# Patient Record
Sex: FEMALE | Race: WHITE | NOT HISPANIC OR LATINO | Employment: OTHER | URBAN - NONMETROPOLITAN AREA
[De-identification: names, ages, dates, MRNs, and addresses within clinical notes are randomized per-mention and may not be internally consistent; named-entity substitution may affect disease eponyms.]

---

## 2023-03-27 ENCOUNTER — TELEPHONE (OUTPATIENT)
Dept: OBSTETRICS AND GYNECOLOGY | Facility: CLINIC | Age: 33
End: 2023-03-27

## 2023-03-27 NOTE — TELEPHONE ENCOUNTER
Caller:  GERSON     Relationship: SELF     Best call back number: [phone]    What is the best time to reach you: ANYTIME     Who are you requesting to speak with (clinical staff, provider,  specific staff member): BILLIE OR SEAN SOLIS         What was the call regarding: PT HAS QUESTION REGARDING WHAT MEDICATIONS ARE SAFE TO TAKE DURING PREGNANCY.    Do you require a callback: YES

## 2023-07-13 ENCOUNTER — TELEPHONE (OUTPATIENT)
Dept: OBSTETRICS AND GYNECOLOGY | Facility: CLINIC | Age: 33
End: 2023-07-13

## 2023-07-13 NOTE — TELEPHONE ENCOUNTER
Provider: JORGE ALBERTO Angulo  Caller: GERSON SANCHEZ  Relationship to Patient: SELF    Phone Number: [phone]  Reason for Call: PATIENT DID NOT GO TO LABOR PERDUE  When was the patient last seen: 06.30.23  When did it start: 07.12.23 AROUND 04:30 PM  TO 09:30 PM  Where is it located: ABD    PATIENT STATED THAT SHE DID CALL [facility] YESTERDAY 07.12.23. PATIENT STATED THAT SHE HAD TIGHTNESS, HEAVINESS AND MUSCLE CRAMPS THAT WENT AWAY LATER AS IT GOT CLOSER TO 9:30 PM.  PATIENT WOULD LIKE TO KNOW WHAT YOU WOULD LIKE FOR HER TO DO NEXT.    PATIENT CAN BE REACHED AT THE NUMBER ABOVE    THANK YOU

## 2023-09-20 ENCOUNTER — TELEPHONE (OUTPATIENT)
Dept: OBSTETRICS AND GYNECOLOGY | Facility: CLINIC | Age: 33
End: 2023-09-20

## 2023-09-20 NOTE — TELEPHONE ENCOUNTER
Hub staff attempted to follow warm transfer process and was unsuccessful     Caller: Lauren Velasquez    Relationship to patient: Self    Best call back number: [phone]    Patient is needing: PT IS 31 WEEKS NOTICED FEET AND ANKLES ARE SWOLLEN. A LOT OF BACK PAIN IN LOWER BACK AND LEG PAIN. THIS MORNING SHE IS HAVING CRAMPING ON AND OFF AND A LITTLE PRESSURE.

## 2023-10-29 ENCOUNTER — HOSPITAL ENCOUNTER (OUTPATIENT)
Facility: HOSPITAL | Age: 33
Discharge: HOME | End: 2023-10-29
Attending: OBSTETRICS & GYNECOLOGY | Admitting: OBSTETRICS & GYNECOLOGY
Payer: MEDICAID

## 2023-10-29 VITALS
OXYGEN SATURATION: 99 % | DIASTOLIC BLOOD PRESSURE: 57 MMHG | TEMPERATURE: 98.3 F | SYSTOLIC BLOOD PRESSURE: 103 MMHG | HEART RATE: 77 BPM

## 2023-10-29 LAB
BILIRUB BLD-MCNC: NEGATIVE MG/DL
CLARITY, POC: CLEAR
COLOR UR: YELLOW
GLUCOSE UR STRIP-MCNC: NEGATIVE MG/DL
KETONES UR QL: ABNORMAL
LEUKOCYTE EST, POC: NEGATIVE
NITRITE UR-MCNC: NEGATIVE MG/ML
PH UR: 6.5 [PH] (ref 5–8)
PROT UR STRIP-MCNC: NEGATIVE MG/DL
RBC # UR STRIP: NEGATIVE /UL
SP GR UR: 1.02 (ref 1–1.03)
UROBILINOGEN UR QL: NORMAL

## 2023-10-29 PROCEDURE — 84112 EVAL AMNIOTIC FLUID PROTEIN: CPT | Performed by: OBSTETRICS & GYNECOLOGY

## 2023-10-29 PROCEDURE — G0463 HOSPITAL OUTPT CLINIC VISIT: HCPCS

## 2023-10-29 PROCEDURE — 81002 URINALYSIS NONAUTO W/O SCOPE: CPT | Performed by: OBSTETRICS & GYNECOLOGY

## 2023-10-29 PROCEDURE — 59025 FETAL NON-STRESS TEST: CPT

## 2023-10-29 RX ORDER — SODIUM CHLORIDE 0.9 % (FLUSH) 0.9 %
10 SYRINGE (ML) INJECTION EVERY 12 HOURS SCHEDULED
Status: DISCONTINUED | OUTPATIENT
Start: 2023-10-29 | End: 2023-10-29 | Stop reason: HOSPADM

## 2023-10-29 RX ORDER — LIDOCAINE HYDROCHLORIDE 10 MG/ML
0.5 INJECTION, SOLUTION INFILTRATION; PERINEURAL ONCE AS NEEDED
Status: DISCONTINUED | OUTPATIENT
Start: 2023-10-29 | End: 2023-10-29 | Stop reason: HOSPADM

## 2023-10-29 RX ORDER — SODIUM CHLORIDE 9 MG/ML
40 INJECTION, SOLUTION INTRAVENOUS AS NEEDED
Status: DISCONTINUED | OUTPATIENT
Start: 2023-10-29 | End: 2023-10-29 | Stop reason: HOSPADM

## 2023-10-29 RX ORDER — SODIUM CHLORIDE 0.9 % (FLUSH) 0.9 %
10 SYRINGE (ML) INJECTION AS NEEDED
Status: DISCONTINUED | OUTPATIENT
Start: 2023-10-29 | End: 2023-10-29 | Stop reason: HOSPADM

## 2023-10-30 ENCOUNTER — ROUTINE PRENATAL (OUTPATIENT)
Dept: OBSTETRICS AND GYNECOLOGY | Facility: CLINIC | Age: 33
End: 2023-10-30
Payer: MEDICAID

## 2023-10-30 VITALS — DIASTOLIC BLOOD PRESSURE: 64 MMHG | SYSTOLIC BLOOD PRESSURE: 110 MMHG | BODY MASS INDEX: 45.66 KG/M2 | WEIGHT: 278.6 LBS

## 2023-10-30 DIAGNOSIS — Z36.85 ENCOUNTER FOR ANTENATAL SCREENING FOR STREPTOCOCCUS B: Primary | ICD-10-CM

## 2023-10-30 LAB — A1 MICROGLOB PLACENTAL VAG QL: NEGATIVE

## 2023-10-30 PROCEDURE — 99213 OFFICE O/P EST LOW 20 MIN: CPT | Performed by: OBSTETRICS & GYNECOLOGY

## 2023-10-30 RX ORDER — FERROUS SULFATE 325(65) MG
325 TABLET ORAL
Qty: 60 TABLET | Refills: 10 | Status: SHIPPED | OUTPATIENT
Start: 2023-10-30

## 2023-10-30 NOTE — NON STRESS TEST
"Triage Note - Nursing Documentation  Labor and Delivery Admission Log    Lauren Carrasco  : 1990  MRN: [id number]  CSN: [id number]    Date in / Time in:  10/29/2023  Time in:     Date out / Time out:  10/29/2023  Time out:     Nurse: Laurel Mandel RN    Patient Info: She is a 33 y.o. year old  at 36w5d with an SILVIO of 2023, by Last Menstrual Period who was seen on the [facility].    Chief Complaint:   Chief Complaint   Patient presents with    Rupture of Membranes     \"I think my water broke\"       Provider Instructions / Disposition: Pt presented to  complaining of LOF happening around . Amnisure negative. No VB. Occasional ctx. VE . Dr. Jimenez ok for pt to be dc home and keep fu appt tomorrow. Pt verbally understood instructions about when to return to .    Patient Active Problem List   Diagnosis    Hx of  section    S/P  section    Previous  section       NST Documentation (Only applicable > 32 weeks): Interpretation A  Nonstress Test Interpretation A: Reactive (10/29/23 2129 : Laurel Mandel, RN)    "

## 2023-10-30 NOTE — PROGRESS NOTES
Chief Complaint   Patient presents with    Routine Prenatal Visit     Prenatal visit with GBS done today. No problems or concerns.        HPI:   , 36w6d gestation reports doing well    ROS:  See Prenatal Episode/Flowsheet  /64   Wt 126 kg (278 lb 9.6 oz)   LMP 2023   BMI 45.66 kg/m²      EXAM:  EXTREMITIES:  No swelling-See Prenatal Episode/Flowsheet    ABDOMEN:  FHTs/Movement noted-See Prenatal Episode/Flowsheet    URINE GLUCOSE/PROTEIN:  See Prenatal Episode/Flowsheet    PELVIC EXAM:  See Prenatal Episode/Flowsheet  CV:  Lungs:  GYN:    MDM:    Lab Results   Component Value Date    HGB 11.1 (L) 2023    RUBELLAABIGG 1.44 2023    HEPBSAG Negative 2023    ABO O 2023    RH Positive 2023    ABSCRN Negative 2023    WSF1LBC9 Non Reactive 2023    HEPCVIRUSABY Non Reactive 2023       U/S:US Ob Follow Up Transabdominal Approach (2023 10:32)     1. IUP 36w6d  2. Routine care   3. Anemia: Fe sent in   4. R C/S set

## 2023-10-31 NOTE — NON STRESS TEST
Non Stress Test    [facility]    Patient: Lauren Carrasco  : 1990  MRN: [id number]  CSN: [id number]    Gestational Age: 36w5d    Indication for NST Leaking fluid, cramping       Time On 20:36   Time Off 21:30       Interpretation    Baseline 's beats per minute   Category 1   Decelerations Absent       Additional Comments See nursing notes       Recommendations for f/u See nursing notes       This note has been electronically signed.    Bri Jimenez M.D.

## 2023-11-01 LAB — GP B STREP DNA SPEC QL NAA+PROBE: NEGATIVE

## 2023-11-02 ENCOUNTER — HOSPITAL ENCOUNTER (OUTPATIENT)
Facility: HOSPITAL | Age: 33
Setting detail: OBSERVATION
Discharge: HOME | End: 2023-11-03
Attending: OBSTETRICS & GYNECOLOGY | Admitting: OBSTETRICS & GYNECOLOGY
Payer: MEDICAID

## 2023-11-02 ENCOUNTER — HOSPITAL ENCOUNTER (EMERGENCY)
Facility: HOSPITAL | Age: 33
Discharge: HOME | End: 2023-11-02
Attending: STUDENT IN AN ORGANIZED HEALTH CARE EDUCATION/TRAINING PROGRAM
Payer: MEDICAID

## 2023-11-02 VITALS
RESPIRATION RATE: 16 BRPM | DIASTOLIC BLOOD PRESSURE: 64 MMHG | TEMPERATURE: 98.1 F | HEIGHT: 66 IN | WEIGHT: 278.9 LBS | BODY MASS INDEX: 44.82 KG/M2 | OXYGEN SATURATION: 98 % | SYSTOLIC BLOOD PRESSURE: 116 MMHG | HEART RATE: 83 BPM

## 2023-11-02 DIAGNOSIS — O36.8130 DECREASED FETAL MOVEMENTS IN THIRD TRIMESTER, SINGLE OR UNSPECIFIED FETUS: ICD-10-CM

## 2023-11-02 DIAGNOSIS — R10.9 ABDOMINAL CRAMPING: Primary | ICD-10-CM

## 2023-11-02 DIAGNOSIS — R19.7 DIARRHEA, UNSPECIFIED TYPE: ICD-10-CM

## 2023-11-02 DIAGNOSIS — R11.0 NAUSEA: ICD-10-CM

## 2023-11-02 PROBLEM — Z34.90 PREGNANCY: Status: ACTIVE | Noted: 2023-11-02

## 2023-11-02 LAB
ALBUMIN SERPL-MCNC: 3.7 G/DL (ref 3.5–5.2)
ALBUMIN/GLOB SERPL: 1.1 G/DL
ALP SERPL-CCNC: 108 U/L (ref 39–117)
ALT SERPL W P-5'-P-CCNC: 22 U/L (ref 1–33)
ANION GAP SERPL CALCULATED.3IONS-SCNC: 11.8 MMOL/L (ref 5–15)
AST SERPL-CCNC: 22 U/L (ref 1–32)
BACTERIA UR QL AUTO: NORMAL /HPF
BASOPHILS # BLD AUTO: 0.04 10*3/MM3 (ref 0–0.2)
BASOPHILS NFR BLD AUTO: 0.6 % (ref 0–1.5)
BILIRUB SERPL-MCNC: 0.3 MG/DL (ref 0–1.2)
BILIRUB UR QL STRIP: NEGATIVE
BUN SERPL-MCNC: 8 MG/DL (ref 6–20)
BUN/CREAT SERPL: 12.9 (ref 7–25)
CALCIUM SPEC-SCNC: 9.6 MG/DL (ref 8.6–10.5)
CHLORIDE SERPL-SCNC: 101 MMOL/L (ref 98–107)
CLARITY UR: ABNORMAL
CO2 SERPL-SCNC: 20.2 MMOL/L (ref 22–29)
COLOR UR: YELLOW
CREAT SERPL-MCNC: 0.62 MG/DL (ref 0.57–1)
D-LACTATE SERPL-SCNC: 0.9 MMOL/L (ref 0.5–2)
DEPRECATED RDW RBC AUTO: 45.1 FL (ref 37–54)
EGFRCR SERPLBLD CKD-EPI 2021: 120.8 ML/MIN/1.73
EOSINOPHIL # BLD AUTO: 0.01 10*3/MM3 (ref 0–0.4)
EOSINOPHIL NFR BLD AUTO: 0.2 % (ref 0.3–6.2)
ERYTHROCYTE [DISTWIDTH] IN BLOOD BY AUTOMATED COUNT: 14.5 % (ref 12.3–15.4)
FLUAV RNA RESP QL NAA+PROBE: NOT DETECTED
FLUBV RNA RESP QL NAA+PROBE: NOT DETECTED
GLOBULIN UR ELPH-MCNC: 3.3 GM/DL
GLUCOSE SERPL-MCNC: 86 MG/DL (ref 65–99)
GLUCOSE UR STRIP-MCNC: NEGATIVE MG/DL
HCT VFR BLD AUTO: 36.2 % (ref 34–46.6)
HGB BLD-MCNC: 12 G/DL (ref 12–15.9)
HGB UR QL STRIP.AUTO: NEGATIVE
HOLD SPECIMEN: NORMAL
HOLD SPECIMEN: NORMAL
HYALINE CASTS UR QL AUTO: NORMAL /LPF
IMM GRANULOCYTES # BLD AUTO: 0.21 10*3/MM3 (ref 0–0.05)
IMM GRANULOCYTES NFR BLD AUTO: 3.2 % (ref 0–0.5)
KETONES UR QL STRIP: ABNORMAL
LEUKOCYTE ESTERASE UR QL STRIP.AUTO: ABNORMAL
LIPASE SERPL-CCNC: 19 U/L (ref 13–60)
LYMPHOCYTES # BLD AUTO: 0.97 10*3/MM3 (ref 0.7–3.1)
LYMPHOCYTES NFR BLD AUTO: 14.7 % (ref 19.6–45.3)
MCH RBC QN AUTO: 28.4 PG (ref 26.6–33)
MCHC RBC AUTO-ENTMCNC: 33.1 G/DL (ref 31.5–35.7)
MCV RBC AUTO: 85.6 FL (ref 79–97)
MONOCYTES # BLD AUTO: 0.49 10*3/MM3 (ref 0.1–0.9)
MONOCYTES NFR BLD AUTO: 7.4 % (ref 5–12)
NEUTROPHILS NFR BLD AUTO: 4.89 10*3/MM3 (ref 1.7–7)
NEUTROPHILS NFR BLD AUTO: 73.9 % (ref 42.7–76)
NITRITE UR QL STRIP: NEGATIVE
NRBC BLD AUTO-RTO: 0 /100 WBC (ref 0–0.2)
PH UR STRIP.AUTO: 5.5 [PH] (ref 5–8)
PLATELET # BLD AUTO: 208 10*3/MM3 (ref 140–450)
PMV BLD AUTO: 10.3 FL (ref 6–12)
POTASSIUM SERPL-SCNC: 4.3 MMOL/L (ref 3.5–5.2)
PROT SERPL-MCNC: 7 G/DL (ref 6–8.5)
PROT UR QL STRIP: ABNORMAL
RBC # BLD AUTO: 4.23 10*6/MM3 (ref 3.77–5.28)
RBC # UR STRIP: NORMAL /HPF
REF LAB TEST METHOD: NORMAL
SARS-COV-2 RNA RESP QL NAA+PROBE: NOT DETECTED
SODIUM SERPL-SCNC: 133 MMOL/L (ref 136–145)
SP GR UR STRIP: >=1.03 (ref 1–1.03)
SQUAMOUS #/AREA URNS HPF: NORMAL /HPF
UROBILINOGEN UR QL STRIP: ABNORMAL
WBC # UR STRIP: NORMAL /HPF
WBC NRBC COR # BLD: 6.61 10*3/MM3 (ref 3.4–10.8)
WHOLE BLOOD HOLD COAG: NORMAL
WHOLE BLOOD HOLD SPECIMEN: NORMAL

## 2023-11-02 PROCEDURE — 25810000003 LACTATED RINGERS PER 1000 ML: Performed by: OBSTETRICS & GYNECOLOGY

## 2023-11-02 PROCEDURE — G0463 HOSPITAL OUTPT CLINIC VISIT: HCPCS

## 2023-11-02 PROCEDURE — 59025 FETAL NON-STRESS TEST: CPT

## 2023-11-02 PROCEDURE — 87636 SARSCOV2 & INF A&B AMP PRB: CPT

## 2023-11-02 PROCEDURE — 85025 COMPLETE CBC W/AUTO DIFF WBC: CPT

## 2023-11-02 PROCEDURE — 36415 COLL VENOUS BLD VENIPUNCTURE: CPT

## 2023-11-02 PROCEDURE — G0378 HOSPITAL OBSERVATION PER HR: HCPCS

## 2023-11-02 PROCEDURE — 25010000002 PROMETHAZINE PER 50 MG: Performed by: MIDWIFE

## 2023-11-02 PROCEDURE — 96360 HYDRATION IV INFUSION INIT: CPT

## 2023-11-02 PROCEDURE — 83690 ASSAY OF LIPASE: CPT

## 2023-11-02 PROCEDURE — 83605 ASSAY OF LACTIC ACID: CPT

## 2023-11-02 PROCEDURE — 81001 URINALYSIS AUTO W/SCOPE: CPT

## 2023-11-02 PROCEDURE — 96361 HYDRATE IV INFUSION ADD-ON: CPT

## 2023-11-02 PROCEDURE — 80053 COMPREHEN METABOLIC PANEL: CPT

## 2023-11-02 PROCEDURE — 25810000003 SODIUM CHLORIDE 0.9 % SOLUTION: Performed by: PHYSICIAN ASSISTANT

## 2023-11-02 PROCEDURE — 25010000002 ONDANSETRON PER 1 MG: Performed by: PHYSICIAN ASSISTANT

## 2023-11-02 PROCEDURE — 96374 THER/PROPH/DIAG INJ IV PUSH: CPT

## 2023-11-02 PROCEDURE — 25810000003 LACTATED RINGERS SOLUTION: Performed by: MIDWIFE

## 2023-11-02 PROCEDURE — 99283 EMERGENCY DEPT VISIT LOW MDM: CPT

## 2023-11-02 RX ORDER — ACETAMINOPHEN 500 MG
1000 TABLET ORAL ONCE
Status: COMPLETED | OUTPATIENT
Start: 2023-11-02 | End: 2023-11-02

## 2023-11-02 RX ORDER — SODIUM CHLORIDE 0.9 % (FLUSH) 0.9 %
10 SYRINGE (ML) INJECTION AS NEEDED
Status: DISCONTINUED | OUTPATIENT
Start: 2023-11-02 | End: 2023-11-03 | Stop reason: HOSPADM

## 2023-11-02 RX ORDER — SODIUM CHLORIDE 0.9 % (FLUSH) 0.9 %
10 SYRINGE (ML) INJECTION AS NEEDED
Status: DISCONTINUED | OUTPATIENT
Start: 2023-11-02 | End: 2023-11-02 | Stop reason: HOSPADM

## 2023-11-02 RX ORDER — ONDANSETRON 2 MG/ML
4 INJECTION INTRAMUSCULAR; INTRAVENOUS ONCE
Status: COMPLETED | OUTPATIENT
Start: 2023-11-02 | End: 2023-11-02

## 2023-11-02 RX ORDER — SODIUM CHLORIDE, SODIUM LACTATE, POTASSIUM CHLORIDE, CALCIUM CHLORIDE 600; 310; 30; 20 MG/100ML; MG/100ML; MG/100ML; MG/100ML
125 INJECTION, SOLUTION INTRAVENOUS CONTINUOUS
Status: DISCONTINUED | OUTPATIENT
Start: 2023-11-02 | End: 2023-11-03 | Stop reason: HOSPADM

## 2023-11-02 RX ORDER — SODIUM CHLORIDE 0.9 % (FLUSH) 0.9 %
10 SYRINGE (ML) INJECTION EVERY 12 HOURS SCHEDULED
Status: DISCONTINUED | OUTPATIENT
Start: 2023-11-02 | End: 2023-11-03 | Stop reason: HOSPADM

## 2023-11-02 RX ORDER — LIDOCAINE HYDROCHLORIDE 10 MG/ML
0.5 INJECTION, SOLUTION INFILTRATION; PERINEURAL ONCE AS NEEDED
Status: DISCONTINUED | OUTPATIENT
Start: 2023-11-02 | End: 2023-11-03 | Stop reason: HOSPADM

## 2023-11-02 RX ORDER — SODIUM CHLORIDE 9 MG/ML
40 INJECTION, SOLUTION INTRAVENOUS AS NEEDED
Status: DISCONTINUED | OUTPATIENT
Start: 2023-11-02 | End: 2023-11-03 | Stop reason: HOSPADM

## 2023-11-02 RX ADMIN — ACETAMINOPHEN 1000 MG: 500 TABLET, FILM COATED ORAL at 18:08

## 2023-11-02 RX ADMIN — SODIUM CHLORIDE 1000 ML: 9 INJECTION, SOLUTION INTRAVENOUS at 17:26

## 2023-11-02 RX ADMIN — SODIUM CHLORIDE, POTASSIUM CHLORIDE, SODIUM LACTATE AND CALCIUM CHLORIDE 1000 ML: 600; 310; 30; 20 INJECTION, SOLUTION INTRAVENOUS at 18:08

## 2023-11-02 RX ADMIN — PROMETHAZINE HYDROCHLORIDE 12.5 MG: 25 INJECTION INTRAMUSCULAR; INTRAVENOUS at 20:43

## 2023-11-02 RX ADMIN — SODIUM CHLORIDE, POTASSIUM CHLORIDE, SODIUM LACTATE AND CALCIUM CHLORIDE 125 ML/HR: 600; 310; 30; 20 INJECTION, SOLUTION INTRAVENOUS at 20:43

## 2023-11-02 RX ADMIN — ONDANSETRON 4 MG: 2 INJECTION INTRAMUSCULAR; INTRAVENOUS at 17:30

## 2023-11-02 NOTE — ED PROVIDER NOTES
Subjective:  Chief Complaint:  Nausea, diarrhea    History of Present Illness:  Patient is a 33-year-old -0-2-1 female at 37 weeks gestation presenting to the ER with complaints of nausea, diarrhea, abdominal cramping, decreased fetal movement.  She states she last felt movement at noon, around 5-1/2 hours ago.  Fetal heart rate was obtained and found to be 141 bpm.  Patient states that she thought her water broke on 10- and was monitored in L&D and told that it was just watery discharge.  She states she followed up with Dr. Otero the next day but states that she has not really felt right since the .  She appears to be scheduled for a  on 2023.  Patient states that she believes she may have picked up a stomach bug.  She denies vomiting but reports she has had nausea and diarrhea.  Denies additional symptoms or complaints at this time.      Nurses Notes reviewed and agree, including vitals, allergies, social history and prior medical history.     REVIEW OF SYSTEMS: All systems reviewed and not pertinent unless noted.  Review of Systems   Gastrointestinal:  Positive for diarrhea and nausea.        Abdominal cramping   All other systems reviewed and are negative.      Past Medical History:   Diagnosis Date    Abnormal Pap smear of cervix     Hypertension     Recurrent pregnancy loss, antepartum condition or complication 2021/2022       Allergies:    Patient has no known allergies.      Past Surgical History:   Procedure Laterality Date    ADENOIDECTOMY       SECTION           Social History     Socioeconomic History    Marital status:    Tobacco Use    Smoking status: Never    Smokeless tobacco: Never   Vaping Use    Vaping Use: Never used   Substance and Sexual Activity    Alcohol use: No    Drug use: No    Sexual activity: Yes     Partners: Male     Birth control/protection: None         Family History   Problem Relation Age of Onset    Hypertension Father      "Stroke Father     Hyperlipidemia Mother     Stroke Mother         Blood clots       Objective  Physical Exam:  /64   Pulse 83   Temp 98.1 °F (36.7 °C) (Oral)   Resp 16   Ht 166.4 cm (65.5\")   Wt 127 kg (278 lb 14.4 oz)   LMP 02/14/2023   SpO2 98%   BMI 45.71 kg/m²      Physical Exam  Vitals and nursing note reviewed.   Constitutional:       General: She is not in acute distress.     Appearance: She is not toxic-appearing.   HENT:      Head: Normocephalic and atraumatic.   Eyes:      Extraocular Movements: Extraocular movements intact.   Cardiovascular:      Rate and Rhythm: Normal rate.      Heart sounds: Normal heart sounds.   Pulmonary:      Effort: Pulmonary effort is normal. No respiratory distress.   Abdominal:      Palpations: Abdomen is soft.      Tenderness: There is no guarding or rebound.   Skin:     General: Skin is warm and dry.   Neurological:      General: No focal deficit present.      Mental Status: She is alert and oriented to person, place, and time.   Psychiatric:         Mood and Affect: Mood normal.         Behavior: Behavior normal.         Procedures    ED Course:         Lab Results (last 24 hours)       Procedure Component Value Units Date/Time    CBC & Differential [[id number]]  (Abnormal) Collected: 11/02/23 1704    Specimen: Blood Updated: 11/02/23 1725    Narrative:      The following orders were created for panel order CBC & Differential.  Procedure                               Abnormality         Status                     ---------                               -----------         ------                     CBC Auto Differential[[id number]]        Abnormal            Final result                 Please view results for these tests on the individual orders.    Comprehensive Metabolic Panel [[id number]]  (Abnormal) Collected: 11/02/23 1704    Specimen: Blood Updated: 11/02/23 1732     Glucose 86 mg/dL      BUN 8 mg/dL      Creatinine 0.62 mg/dL      Sodium 133 mmol/L      " Potassium 4.3 mmol/L      Chloride 101 mmol/L      CO2 20.2 mmol/L      Calcium 9.6 mg/dL      Total Protein 7.0 g/dL      Albumin 3.7 g/dL      ALT (SGPT) 22 U/L      AST (SGOT) 22 U/L      Alkaline Phosphatase 108 U/L      Total Bilirubin 0.3 mg/dL      Globulin 3.3 gm/dL      A/G Ratio 1.1 g/dL      BUN/Creatinine Ratio 12.9     Anion Gap 11.8 mmol/L      eGFR 120.8 mL/min/1.73     Narrative:      GFR Normal >60  Chronic Kidney Disease <60  Kidney Failure <15      Lipase [[id number]]  (Normal) Collected: 11/02/23 1704    Specimen: Blood Updated: 11/02/23 1732     Lipase 19 U/L     CBC Auto Differential [[id number]]  (Abnormal) Collected: 11/02/23 1704    Specimen: Blood Updated: 11/02/23 1725     WBC 6.61 10*3/mm3      RBC 4.23 10*6/mm3      Hemoglobin 12.0 g/dL      Hematocrit 36.2 %      MCV 85.6 fL      MCH 28.4 pg      MCHC 33.1 g/dL      RDW 14.5 %      RDW-SD 45.1 fl      MPV 10.3 fL      Platelets 208 10*3/mm3      Neutrophil % 73.9 %      Lymphocyte % 14.7 %      Monocyte % 7.4 %      Eosinophil % 0.2 %      Basophil % 0.6 %      Immature Grans % 3.2 %      Neutrophils, Absolute 4.89 10*3/mm3      Lymphocytes, Absolute 0.97 10*3/mm3      Monocytes, Absolute 0.49 10*3/mm3      Eosinophils, Absolute 0.01 10*3/mm3      Basophils, Absolute 0.04 10*3/mm3      Immature Grans, Absolute 0.21 10*3/mm3      nRBC 0.0 /100 WBC     Lactic Acid, Plasma [[id number]]  (Normal) Collected: 11/02/23 1708    Specimen: Blood Updated: 11/02/23 1730     Lactate 0.9 mmol/L     Urinalysis With Microscopic If Indicated (No Culture) - Urine, Clean Catch [[id number]]  (Abnormal) Collected: 11/02/23 1709    Specimen: Urine, Clean Catch Updated: 11/02/23 1720     Color, UA Yellow     Appearance, UA Cloudy     pH, UA 5.5     Specific Gravity, UA >=1.030     Glucose, UA Negative     Ketones, UA Trace     Bilirubin, UA Negative     Blood, UA Negative     Protein, UA Trace     Leuk Esterase, UA Trace     Nitrite, UA Negative     Urobilinogen,  UA 0.2 E.U./dL    Urinalysis, Microscopic Only - Urine, Clean Catch [[id number]] Collected: 23 1709    Specimen: Urine, Clean Catch Updated: 23 1724     RBC, UA None Seen /HPF      WBC, UA 0-2 /HPF      Bacteria, UA None Seen /HPF      Squamous Epithelial Cells, UA 0-2 /HPF      Hyaline Casts, UA None Seen /LPF      Methodology Manual Light Microscopy    COVID-19 and FLU A/B PCR - Swab, Nasopharynx [[id number]]  (Normal) Collected: 23 1710    Specimen: Swab from Nasopharynx Updated: 23 1736     COVID19 Not Detected     Influenza A PCR Not Detected     Influenza B PCR Not Detected    Narrative:      Fact sheet for providers: https://www.fda.gov/media/558986/download    Fact sheet for patients: https://www.fda.gov/media/866662/download    Test performed by PCR.             No radiology results from the last 24 hrs       MDM  Patient was evaluated in the ER for abdominal cramping, nausea, diarrhea, decreased fetal movement.  Patient is hemodynamically stable, afebrile, nontoxic-appearing on exam.  She is 37 weeks gestation and scheduled for  in 12 days.  Differential diagnosis includes but is not limited to electrolyte abnormalities, dehydration, viral gastroenteritis, contractions, among others.  Fetal heart tones obtained noted to be 141 bpm.  Immediately after discussion with patient, labs were reviewed that were placed at triage and found to be unremarkable.  Discussed the patient immediately with Dr. Jimenez, OB/GYN, who states that patient will need to be monitored and sent to L&D.  Patient discharged and taken up to L&D for further evaluation management.    Final diagnoses:   Abdominal cramping   Nausea   Diarrhea, unspecified type   Decreased fetal movements in third trimester, single or unspecified fetus          Donna Lantigua PA-C  [id number] [phone]

## 2023-11-03 VITALS
WEIGHT: 274 LBS | BODY MASS INDEX: 45.65 KG/M2 | RESPIRATION RATE: 16 BRPM | OXYGEN SATURATION: 99 % | TEMPERATURE: 97.9 F | HEIGHT: 65 IN | DIASTOLIC BLOOD PRESSURE: 74 MMHG | HEART RATE: 71 BPM | SYSTOLIC BLOOD PRESSURE: 118 MMHG

## 2023-11-03 PROCEDURE — 25810000003 LACTATED RINGERS PER 1000 ML: Performed by: OBSTETRICS & GYNECOLOGY

## 2023-11-03 PROCEDURE — 96361 HYDRATE IV INFUSION ADD-ON: CPT

## 2023-11-03 PROCEDURE — G0378 HOSPITAL OBSERVATION PER HR: HCPCS

## 2023-11-03 RX ORDER — ONDANSETRON 4 MG/1
4 TABLET, ORALLY DISINTEGRATING ORAL EVERY 8 HOURS PRN
Qty: 10 TABLET | Refills: 0 | Status: SHIPPED | OUTPATIENT
Start: 2023-11-03 | End: 2023-11-09 | Stop reason: HOSPADM

## 2023-11-03 RX ORDER — ACETAMINOPHEN 500 MG
1000 TABLET ORAL ONCE
Status: COMPLETED | OUTPATIENT
Start: 2023-11-03 | End: 2023-11-03

## 2023-11-03 RX ADMIN — SODIUM CHLORIDE, POTASSIUM CHLORIDE, SODIUM LACTATE AND CALCIUM CHLORIDE 125 ML/HR: 600; 310; 30; 20 INJECTION, SOLUTION INTRAVENOUS at 05:12

## 2023-11-03 RX ADMIN — ACETAMINOPHEN 1000 MG: 500 TABLET, FILM COATED ORAL at 02:54

## 2023-11-03 NOTE — H&P
: 1990  MRN: [id number]  CSN: [id number]    History and Physical    Chief Complaint   Patient presents with    Abdominal Cramping     Started Tuesday worse today      Lauren Carrasco is a 33 y.o. year old  with an Estimated Date of Delivery: 23 currently at 37w3d who was evaluated in ED yesterday for N/V/D and fever. She was also having abdominal cramping and was sent to  for EFM and evaluation. She was given IVFs, Zofran and Phenergan IV. N/V has resolved. Sh estates ctxs have spaced out some. She denies LOF or vaginal bleeding    She has received prenatal care with [facility]. It has been complicated by anemia      OB History    Para Term  AB Living   4 1 1 0 2 1   SAB IAB Ectopic Molar Multiple Live Births   1 0 0 0 0 0      # Outcome Date GA Lbr Juan Carlos/2nd Weight Sex Delivery Anes PTL Lv   4 Current            3 SAB  5w0d    SAB      2 AB  7w0d          1 Term 07   3118 g (6 lb 14 oz) F CS-LTranv         Birth Comments: dilated to 5 cm     Past Medical History:   Diagnosis Date    Abnormal Pap smear of cervix     Hypertension     Recurrent pregnancy loss, antepartum condition or complication 2021/2022     Past Surgical History:   Procedure Laterality Date    ADENOIDECTOMY       SECTION         Current Facility-Administered Medications:     lactated ringers infusion, 125 mL/hr, Intravenous, Continuous, Bri Jimenez MD, Last Rate: 125 mL/hr at 23 0512, 125 mL/hr at 23 0512    lidocaine (XYLOCAINE) 1 % injection 0.5 mL, 0.5 mL, Intradermal, Once PRN, Kevin, Magaly A, CNM    promethazine (PHENERGAN) 12.5 mg in sodium chloride 0.9 % 50 mL, 12.5 mg, Intravenous, Q4H PRN, Magaly Brown A, CNM, 12.5 mg at 23    sodium chloride 0.9 % flush 10 mL, 10 mL, Intravenous, Q12H, Uzma Brownorah A, CNM    sodium chloride 0.9 % flush 10 mL, 10 mL, Intravenous, PRN, Magaly Brown, CNM    sodium chloride 0.9 %  "infusion 40 mL, 40 mL, Intravenous, PRN, Magaly Brown CNM    No Known Allergies    Review of Systems     Respiratory ROS: no cough, shortness of breath, or wheezing  Cardiovascular ROS: no chest pain or dyspnea on exertion  Gastrointestinal ROS: positive for - abdominal pain and nausea/vomiting  Genito-Urinary ROS: no dysuria, trouble voiding, or hematuria        Objective   /65   Pulse 69   Temp 97.9 °F (36.6 °C)   Resp 16   Ht 165.1 cm (65\")   Wt 124 kg (274 lb)   LMP 02/14/2023   SpO2 99%   BMI 45.60 kg/m²     Physical Exam: General Appearance: alert, appears stated age, and cooperative  Lungs: respirations regular, respirations even, and respirations unlabored  Abdomen: uterus gravid and nontender  Extremities: moves extremities well, no edema, no cyanosis, and no redness  Skin: no bleeding, bruising or rash and no lesions noted  Neurologic: Mental Status orientated to person, place, time and situation, Speech normal content and execusion       FHT's: reassuring, reactive, and category 1      Cervix: was checked (by me): 1 cm / 70 % / -3 posterior   Presentation: cephalic   Contractions: every 5-7 minutes - external monitors used         Prenatal Labs  Lab Results   Component Value Date    HGB 12.0 11/02/2023    HEPBSAG Negative 05/02/2023    ABSCRN Negative 05/02/2023    HXK1LDG4 Non Reactive 05/02/2023    HEPCVIRUSABY Non Reactive 05/02/2023    STREPGPB Negative 10/30/2023       Current Labs Reviewed   Lab Results (last 24 hours)       ** No results found for the last 24 hours. **                 Assessment   IUP at 37w3d  N/V/D  Anemia  Hx of CSection           Plan   IVFs     Phenergan  Will review POC with Dr Rios    New Medications Ordered This Visit   Medications    sodium chloride 0.9 % flush 10 mL    sodium chloride 0.9 % flush 10 mL    sodium chloride 0.9 % infusion 40 mL    lidocaine (XYLOCAINE) 1 % injection 0.5 mL    lactated ringers bolus 1,000 mL    acetaminophen (TYLENOL) " tablet 1,000 mg    promethazine (PHENERGAN) 12.5 mg in sodium chloride 0.9 % 50 mL     Order Specific Question:   [facility] PT requires failure of two alternative therapies prior to ordering promethazine IVPB. Has this patient failed two alternative therapies?     Answer:   Yes     Order Specific Question:   Please choose prior alternative therapies that have failed:     Answer:   Zofran    lactated ringers infusion    acetaminophen (TYLENOL) tablet 1,000 mg       Magaly Brown CNM  11/3/2023  08:11 EDT

## 2023-11-03 NOTE — NON STRESS TEST
Triage Note - Nursing Documentation  Labor and Delivery Admission Log    Lauren Carrasco  : 1990  MRN: [id number]  CSN: [id number]    Date in / Time in:  11/3/2023  Time in:     Date out / Time out:    Time out: 0856    Nurse: Miryam Plunkett, RN    Patient Info: She is a 33 y.o. year old  at 37w3d with an SILVIO of 2023, by Last Menstrual Period who was seen on the [facility].    Chief Complaint:   Chief Complaint   Patient presents with    Abdominal Cramping     Started Tuesday worse today       Provider Instructions / Disposition: From ED with c/o nausea and ctx. Irregular ctx pattern VE  per D Foster APRN. DC home in am after observation overnight and fluids. Scheduled c section on . Stable on DC    Patient Active Problem List   Diagnosis    Hx of  section    S/P  section    Previous  section    Pregnancy       NST Documentation (Only applicable > 32 weeks): Interpretation A  Nonstress Test Interpretation A: Reactive (23 0859 : Miryam Plunkett, RN)

## 2023-11-06 ENCOUNTER — ROUTINE PRENATAL (OUTPATIENT)
Dept: OBSTETRICS AND GYNECOLOGY | Facility: CLINIC | Age: 33
End: 2023-11-06
Payer: MEDICAID

## 2023-11-06 ENCOUNTER — PATIENT OUTREACH (OUTPATIENT)
Dept: LABOR AND DELIVERY | Facility: HOSPITAL | Age: 33
End: 2023-11-06
Payer: MEDICAID

## 2023-11-06 VITALS — DIASTOLIC BLOOD PRESSURE: 60 MMHG | SYSTOLIC BLOOD PRESSURE: 108 MMHG | BODY MASS INDEX: 46.39 KG/M2 | WEIGHT: 278.8 LBS

## 2023-11-06 DIAGNOSIS — Z98.891 HX OF CESAREAN SECTION: Primary | ICD-10-CM

## 2023-11-06 PROCEDURE — 99213 OFFICE O/P EST LOW 20 MIN: CPT | Performed by: MIDWIFE

## 2023-11-06 NOTE — PROGRESS NOTES
Chief Complaint   Patient presents with    Routine Prenatal Visit     Patient is still having experiencing contractions since being on L&D.        HPI: Lauren is a  currently at 37w6d here for prenatal visit who reports the following: baby is active. She has continued to have irregular ctxs. She was on LH last week with N/V and was given IVFs. Repeat CSection scheduled for .                EXAM:     Vitals:    23 1054   BP: 108/60      Abdomen:   See prenatal flowsheet as noted and reviewed, soft, nontender   Pelvic:  See prenatal flowsheet as noted and reviewed /high   Urine:  See prenatal flowsheet as noted and reviewed    Lab Results   Component Value Date    ABO O 2023    RH Positive 2023    ABSCRN Negative 2023       MDM:  Impression: Supervision of low risk pregnancy  Previous C/S with planned repeat C/S   Tests done today: none   Topics discussed: kick counts and fetal movement  labor signs and symptoms  Reviewed OB labs   Tests next visit: none                RTO:                        1 weeks    This note was electronically signed.  Magaly Brown, ADAM  2023

## 2023-11-07 ENCOUNTER — ANESTHESIA EVENT (OUTPATIENT)
Dept: PERIOP | Facility: HOSPITAL | Age: 33
End: 2023-11-07
Payer: MEDICAID

## 2023-11-07 ENCOUNTER — HOSPITAL ENCOUNTER (INPATIENT)
Facility: HOSPITAL | Age: 33
LOS: 2 days | Discharge: HOME | End: 2023-11-09
Attending: MIDWIFE | Admitting: MIDWIFE
Payer: MEDICAID

## 2023-11-07 ENCOUNTER — ANESTHESIA (OUTPATIENT)
Dept: PERIOP | Facility: HOSPITAL | Age: 33
End: 2023-11-07
Payer: MEDICAID

## 2023-11-07 DIAGNOSIS — Z98.891 HX OF CESAREAN SECTION: Primary | ICD-10-CM

## 2023-11-07 DIAGNOSIS — Z98.891 S/P CESAREAN SECTION: ICD-10-CM

## 2023-11-07 DIAGNOSIS — Z98.891 PREVIOUS CESAREAN SECTION: ICD-10-CM

## 2023-11-07 LAB
A1 MICROGLOB PLACENTAL VAG QL: NEGATIVE
ABO GROUP BLD: NORMAL
ABO GROUP BLD: NORMAL
ATMOSPHERIC PRESS: 732 MMHG
BASE EXCESS BLDCOV CALC-SCNC: -8.1 MMOL/L (ref -8.3–2.6)
BASOPHILS # BLD AUTO: 0.03 10*3/MM3 (ref 0–0.2)
BASOPHILS NFR BLD AUTO: 0.3 % (ref 0–1.5)
BDY SITE: ABNORMAL
BILIRUB UR QL STRIP: NEGATIVE
BLD GP AB SCN SERPL QL: NEGATIVE
CLARITY UR: CLEAR
COLOR UR: YELLOW
DEPRECATED RDW RBC AUTO: 43.2 FL (ref 37–54)
EOSINOPHIL # BLD AUTO: 0.04 10*3/MM3 (ref 0–0.4)
EOSINOPHIL NFR BLD AUTO: 0.4 % (ref 0.3–6.2)
ERYTHROCYTE [DISTWIDTH] IN BLOOD BY AUTOMATED COUNT: 14.4 % (ref 12.3–15.4)
GLUCOSE UR STRIP-MCNC: NEGATIVE MG/DL
HCO3 BLDCOV-SCNC: 24.7 MMOL/L (ref 17.2–25.6)
HCT VFR BLD AUTO: 32.5 % (ref 34–46.6)
HGB BLD-MCNC: 11.1 G/DL (ref 12–15.9)
HGB UR QL STRIP.AUTO: NEGATIVE
IMM GRANULOCYTES # BLD AUTO: 0.17 10*3/MM3 (ref 0–0.05)
IMM GRANULOCYTES NFR BLD AUTO: 1.8 % (ref 0–0.5)
INHALED O2 CONCENTRATION: 21 %
KETONES UR QL STRIP: NEGATIVE
LEUKOCYTE ESTERASE UR QL STRIP.AUTO: NEGATIVE
LYMPHOCYTES # BLD AUTO: 1.47 10*3/MM3 (ref 0.7–3.1)
LYMPHOCYTES NFR BLD AUTO: 15.7 % (ref 19.6–45.3)
Lab: ABNORMAL
MCH RBC QN AUTO: 28.7 PG (ref 26.6–33)
MCHC RBC AUTO-ENTMCNC: 34.2 G/DL (ref 31.5–35.7)
MCV RBC AUTO: 84 FL (ref 79–97)
MODALITY: ABNORMAL
MONOCYTES # BLD AUTO: 0.49 10*3/MM3 (ref 0.1–0.9)
MONOCYTES NFR BLD AUTO: 5.2 % (ref 5–12)
NEUTROPHILS NFR BLD AUTO: 7.17 10*3/MM3 (ref 1.7–7)
NEUTROPHILS NFR BLD AUTO: 76.6 % (ref 42.7–76)
NITRITE UR QL STRIP: NEGATIVE
NRBC BLD AUTO-RTO: 0 /100 WBC (ref 0–0.2)
PCO2 BLDCOV: 85.9 MM HG (ref 28.8–53.3)
PH BLDCOV: 7.07 PH UNITS (ref 7.23–7.44)
PH UR STRIP.AUTO: 6.5 [PH] (ref 5–8)
PLATELET # BLD AUTO: 232 10*3/MM3 (ref 140–450)
PMV BLD AUTO: 10.5 FL (ref 6–12)
PO2 BLDCOV: <15.4 MM HG (ref 16.4–40)
PROT UR QL STRIP: NEGATIVE
RBC # BLD AUTO: 3.87 10*6/MM3 (ref 3.77–5.28)
RH BLD: POSITIVE
RH BLD: POSITIVE
SAO2 % BLDCOA: ABNORMAL %
SP GR UR STRIP: <=1.005 (ref 1–1.03)
T&S EXPIRATION DATE: NORMAL
UROBILINOGEN UR QL STRIP: NORMAL
VENTILATOR MODE: ABNORMAL
WBC NRBC COR # BLD: 9.37 10*3/MM3 (ref 3.4–10.8)

## 2023-11-07 PROCEDURE — 25810000003 LACTATED RINGERS PER 1000 ML: Performed by: MIDWIFE

## 2023-11-07 PROCEDURE — 86900 BLOOD TYPING SEROLOGIC ABO: CPT | Performed by: MIDWIFE

## 2023-11-07 PROCEDURE — 25010000002 METHYLERGONOVINE MALEATE PER 0.2 MG: Performed by: NURSE ANESTHETIST, CERTIFIED REGISTERED

## 2023-11-07 PROCEDURE — 86901 BLOOD TYPING SEROLOGIC RH(D): CPT | Performed by: MIDWIFE

## 2023-11-07 PROCEDURE — 25010000002 SUGAMMADEX 500 MG/5ML SOLUTION: Performed by: NURSE ANESTHETIST, CERTIFIED REGISTERED

## 2023-11-07 PROCEDURE — 25010000002 CEFAZOLIN SODIUM-DEXTROSE 2-3 GM-%(50ML) RECONSTITUTED SOLUTION: Performed by: MIDWIFE

## 2023-11-07 PROCEDURE — 25810000003 LACTATED RINGERS SOLUTION: Performed by: MIDWIFE

## 2023-11-07 PROCEDURE — 25010000002 BUPIVACAINE PF 0.75 % SOLUTION: Performed by: NURSE ANESTHETIST, CERTIFIED REGISTERED

## 2023-11-07 PROCEDURE — 86850 RBC ANTIBODY SCREEN: CPT | Performed by: MIDWIFE

## 2023-11-07 PROCEDURE — 59025 FETAL NON-STRESS TEST: CPT | Performed by: MIDWIFE

## 2023-11-07 PROCEDURE — 25010000002 DEXAMETHASONE PER 1 MG: Performed by: NURSE ANESTHETIST, CERTIFIED REGISTERED

## 2023-11-07 PROCEDURE — 51703 INSERT BLADDER CATH COMPLEX: CPT

## 2023-11-07 PROCEDURE — 25010000002 METOCLOPRAMIDE PER 10 MG: Performed by: NURSE ANESTHETIST, CERTIFIED REGISTERED

## 2023-11-07 PROCEDURE — 25010000002 KETOROLAC TROMETHAMINE PER 15 MG: Performed by: OBSTETRICS & GYNECOLOGY

## 2023-11-07 PROCEDURE — G0463 HOSPITAL OUTPT CLINIC VISIT: HCPCS

## 2023-11-07 PROCEDURE — 86900 BLOOD TYPING SEROLOGIC ABO: CPT

## 2023-11-07 PROCEDURE — 25010000002 ONDANSETRON PER 1 MG: Performed by: NURSE ANESTHETIST, CERTIFIED REGISTERED

## 2023-11-07 PROCEDURE — 25010000002 PROPOFOL 10 MG/ML EMULSION: Performed by: NURSE ANESTHETIST, CERTIFIED REGISTERED

## 2023-11-07 PROCEDURE — 88307 TISSUE EXAM BY PATHOLOGIST: CPT

## 2023-11-07 PROCEDURE — 81003 URINALYSIS AUTO W/O SCOPE: CPT | Performed by: MIDWIFE

## 2023-11-07 PROCEDURE — 87086 URINE CULTURE/COLONY COUNT: CPT | Performed by: MIDWIFE

## 2023-11-07 PROCEDURE — 84112 EVAL AMNIOTIC FLUID PROTEIN: CPT | Performed by: MIDWIFE

## 2023-11-07 PROCEDURE — 25010000002 MORPHINE PER 10 MG: Performed by: NURSE ANESTHETIST, CERTIFIED REGISTERED

## 2023-11-07 PROCEDURE — 59025 FETAL NON-STRESS TEST: CPT

## 2023-11-07 PROCEDURE — 82805 BLOOD GASES W/O2 SATURATION: CPT

## 2023-11-07 PROCEDURE — 86901 BLOOD TYPING SEROLOGIC RH(D): CPT

## 2023-11-07 PROCEDURE — 25010000002 HYDROMORPHONE 1 MG/ML SOLUTION: Performed by: NURSE ANESTHETIST, CERTIFIED REGISTERED

## 2023-11-07 PROCEDURE — 59515 CESAREAN DELIVERY: CPT | Performed by: OBSTETRICS & GYNECOLOGY

## 2023-11-07 PROCEDURE — 25010000002 OXYTOCIN PER 10 UNITS: Performed by: NURSE ANESTHETIST, CERTIFIED REGISTERED

## 2023-11-07 PROCEDURE — 85025 COMPLETE CBC W/AUTO DIFF WBC: CPT | Performed by: OBSTETRICS & GYNECOLOGY

## 2023-11-07 DEVICE — FLOSEAL WITH RECOTHROM - 10ML.
Type: IMPLANTABLE DEVICE | Site: ABDOMEN | Status: FUNCTIONAL
Brand: FLOSEAL HEMOSTATIC MATRIX

## 2023-11-07 DEVICE — HEMOST ABS SURGIFOAM SZ100 8X12 10MM: Type: IMPLANTABLE DEVICE | Site: ABDOMEN | Status: FUNCTIONAL

## 2023-11-07 RX ORDER — CEFAZOLIN SODIUM 2 G/50ML
2 SOLUTION INTRAVENOUS ONCE
Status: COMPLETED | OUTPATIENT
Start: 2023-11-07 | End: 2023-11-07

## 2023-11-07 RX ORDER — KETOROLAC TROMETHAMINE 30 MG/ML
30 INJECTION, SOLUTION INTRAMUSCULAR; INTRAVENOUS ONCE
Status: COMPLETED | OUTPATIENT
Start: 2023-11-07 | End: 2023-11-07

## 2023-11-07 RX ORDER — ACETAMINOPHEN 325 MG/1
650 TABLET ORAL EVERY 6 HOURS
Status: DISCONTINUED | OUTPATIENT
Start: 2023-11-08 | End: 2023-11-09 | Stop reason: HOSPADM

## 2023-11-07 RX ORDER — LIDOCAINE HYDROCHLORIDE 10 MG/ML
0.5 INJECTION, SOLUTION INFILTRATION; PERINEURAL ONCE AS NEEDED
Status: DISCONTINUED | OUTPATIENT
Start: 2023-11-07 | End: 2023-11-09 | Stop reason: HOSPADM

## 2023-11-07 RX ORDER — MAGNESIUM HYDROXIDE 1200 MG/15ML
LIQUID ORAL AS NEEDED
Status: DISCONTINUED | OUTPATIENT
Start: 2023-11-07 | End: 2023-11-07 | Stop reason: HOSPADM

## 2023-11-07 RX ORDER — CITRIC ACID/SODIUM CITRATE 334-500MG
30 SOLUTION, ORAL ORAL ONCE
Status: COMPLETED | OUTPATIENT
Start: 2023-11-07 | End: 2023-11-07

## 2023-11-07 RX ORDER — PRENATAL VIT/IRON FUM/FOLIC AC 27MG-0.8MG
1 TABLET ORAL DAILY
Status: DISCONTINUED | OUTPATIENT
Start: 2023-11-08 | End: 2023-11-09 | Stop reason: HOSPADM

## 2023-11-07 RX ORDER — CALCIUM CARBONATE 500 MG/1
1 TABLET, CHEWABLE ORAL EVERY 4 HOURS PRN
Status: DISCONTINUED | OUTPATIENT
Start: 2023-11-07 | End: 2023-11-09 | Stop reason: HOSPADM

## 2023-11-07 RX ORDER — ONDANSETRON 4 MG/1
4 TABLET, FILM COATED ORAL EVERY 8 HOURS PRN
Status: DISCONTINUED | OUTPATIENT
Start: 2023-11-07 | End: 2023-11-09 | Stop reason: HOSPADM

## 2023-11-07 RX ORDER — BUPIVACAINE HYDROCHLORIDE 7.5 MG/ML
INJECTION, SOLUTION EPIDURAL; RETROBULBAR
Status: COMPLETED | OUTPATIENT
Start: 2023-11-07 | End: 2023-11-07

## 2023-11-07 RX ORDER — MISOPROSTOL 200 UG/1
800 TABLET ORAL AS NEEDED
Status: DISCONTINUED | OUTPATIENT
Start: 2023-11-07 | End: 2023-11-09 | Stop reason: HOSPADM

## 2023-11-07 RX ORDER — DOCUSATE SODIUM 100 MG/1
100 CAPSULE, LIQUID FILLED ORAL 2 TIMES DAILY PRN
Status: DISCONTINUED | OUTPATIENT
Start: 2023-11-07 | End: 2023-11-09 | Stop reason: HOSPADM

## 2023-11-07 RX ORDER — OXYTOCIN/0.9 % SODIUM CHLORIDE 30/500 ML
333 PLASTIC BAG, INJECTION (ML) INTRAVENOUS ONCE
Status: DISCONTINUED | OUTPATIENT
Start: 2023-11-07 | End: 2023-11-09 | Stop reason: HOSPADM

## 2023-11-07 RX ORDER — SODIUM CHLORIDE 0.9 % (FLUSH) 0.9 %
10 SYRINGE (ML) INJECTION AS NEEDED
Status: DISCONTINUED | OUTPATIENT
Start: 2023-11-07 | End: 2023-11-09 | Stop reason: HOSPADM

## 2023-11-07 RX ORDER — ONDANSETRON 2 MG/ML
4 INJECTION INTRAMUSCULAR; INTRAVENOUS EVERY 6 HOURS PRN
Status: DISCONTINUED | OUTPATIENT
Start: 2023-11-07 | End: 2023-11-09 | Stop reason: HOSPADM

## 2023-11-07 RX ORDER — ONDANSETRON 2 MG/ML
INJECTION INTRAMUSCULAR; INTRAVENOUS AS NEEDED
Status: DISCONTINUED | OUTPATIENT
Start: 2023-11-07 | End: 2023-11-07 | Stop reason: SURG

## 2023-11-07 RX ORDER — METHYLERGONOVINE MALEATE 0.2 MG/ML
200 INJECTION INTRAVENOUS AS NEEDED
Status: DISCONTINUED | OUTPATIENT
Start: 2023-11-07 | End: 2023-11-09 | Stop reason: HOSPADM

## 2023-11-07 RX ORDER — FAMOTIDINE 10 MG/ML
20 INJECTION, SOLUTION INTRAVENOUS ONCE
Status: COMPLETED | OUTPATIENT
Start: 2023-11-07 | End: 2023-11-07

## 2023-11-07 RX ORDER — ONDANSETRON 2 MG/ML
4 INJECTION INTRAMUSCULAR; INTRAVENOUS ONCE AS NEEDED
Status: DISCONTINUED | OUTPATIENT
Start: 2023-11-07 | End: 2023-11-07 | Stop reason: HOSPADM

## 2023-11-07 RX ORDER — LIDOCAINE HCL/PF 100 MG/5ML
SYRINGE (ML) INJECTION AS NEEDED
Status: DISCONTINUED | OUTPATIENT
Start: 2023-11-07 | End: 2023-11-07 | Stop reason: SURG

## 2023-11-07 RX ORDER — METOCLOPRAMIDE HYDROCHLORIDE 5 MG/ML
INJECTION INTRAMUSCULAR; INTRAVENOUS AS NEEDED
Status: DISCONTINUED | OUTPATIENT
Start: 2023-11-07 | End: 2023-11-07 | Stop reason: SURG

## 2023-11-07 RX ORDER — DIPHENHYDRAMINE HCL 25 MG
25 CAPSULE ORAL EVERY 4 HOURS PRN
Status: DISCONTINUED | OUTPATIENT
Start: 2023-11-07 | End: 2023-11-09 | Stop reason: HOSPADM

## 2023-11-07 RX ORDER — ACETAMINOPHEN 500 MG
1000 TABLET ORAL EVERY 6 HOURS
Status: COMPLETED | OUTPATIENT
Start: 2023-11-07 | End: 2023-11-08

## 2023-11-07 RX ORDER — CARBOPROST TROMETHAMINE 250 UG/ML
250 INJECTION, SOLUTION INTRAMUSCULAR AS NEEDED
Status: DISCONTINUED | OUTPATIENT
Start: 2023-11-07 | End: 2023-11-09 | Stop reason: HOSPADM

## 2023-11-07 RX ORDER — METHYLERGONOVINE MALEATE 0.2 MG/ML
INJECTION INTRAVENOUS AS NEEDED
Status: DISCONTINUED | OUTPATIENT
Start: 2023-11-07 | End: 2023-11-07 | Stop reason: SURG

## 2023-11-07 RX ORDER — METHYLERGONOVINE MALEATE 0.2 MG/ML
200 INJECTION INTRAVENOUS ONCE AS NEEDED
Status: DISCONTINUED | OUTPATIENT
Start: 2023-11-07 | End: 2023-11-09 | Stop reason: HOSPADM

## 2023-11-07 RX ORDER — PROMETHAZINE HYDROCHLORIDE 12.5 MG/1
12.5 SUPPOSITORY RECTAL EVERY 6 HOURS PRN
Status: DISCONTINUED | OUTPATIENT
Start: 2023-11-07 | End: 2023-11-09 | Stop reason: HOSPADM

## 2023-11-07 RX ORDER — ACETAMINOPHEN 500 MG
1000 TABLET ORAL ONCE
Status: COMPLETED | OUTPATIENT
Start: 2023-11-07 | End: 2023-11-07

## 2023-11-07 RX ORDER — PROPOFOL 10 MG/ML
VIAL (ML) INTRAVENOUS AS NEEDED
Status: DISCONTINUED | OUTPATIENT
Start: 2023-11-07 | End: 2023-11-07 | Stop reason: SURG

## 2023-11-07 RX ORDER — HYDROXYZINE HYDROCHLORIDE 25 MG/1
50 TABLET, FILM COATED ORAL NIGHTLY PRN
Status: DISCONTINUED | OUTPATIENT
Start: 2023-11-07 | End: 2023-11-09 | Stop reason: HOSPADM

## 2023-11-07 RX ORDER — OXYTOCIN/0.9 % SODIUM CHLORIDE 30/500 ML
42 PLASTIC BAG, INJECTION (ML) INTRAVENOUS AS NEEDED
Status: ACTIVE | OUTPATIENT
Start: 2023-11-07 | End: 2023-11-08

## 2023-11-07 RX ORDER — DEXAMETHASONE SODIUM PHOSPHATE 4 MG/ML
INJECTION, SOLUTION INTRA-ARTICULAR; INTRALESIONAL; INTRAMUSCULAR; INTRAVENOUS; SOFT TISSUE AS NEEDED
Status: DISCONTINUED | OUTPATIENT
Start: 2023-11-07 | End: 2023-11-07 | Stop reason: SURG

## 2023-11-07 RX ORDER — MORPHINE SULFATE 1 MG/ML
INJECTION, SOLUTION EPIDURAL; INTRATHECAL; INTRAVENOUS AS NEEDED
Status: DISCONTINUED | OUTPATIENT
Start: 2023-11-07 | End: 2023-11-07 | Stop reason: SURG

## 2023-11-07 RX ORDER — PROMETHAZINE HYDROCHLORIDE 25 MG/1
25 TABLET ORAL EVERY 6 HOURS PRN
Status: DISCONTINUED | OUTPATIENT
Start: 2023-11-07 | End: 2023-11-09 | Stop reason: HOSPADM

## 2023-11-07 RX ORDER — SODIUM CHLORIDE, SODIUM LACTATE, POTASSIUM CHLORIDE, CALCIUM CHLORIDE 600; 310; 30; 20 MG/100ML; MG/100ML; MG/100ML; MG/100ML
125 INJECTION, SOLUTION INTRAVENOUS CONTINUOUS
Status: DISCONTINUED | OUTPATIENT
Start: 2023-11-07 | End: 2023-11-09 | Stop reason: HOSPADM

## 2023-11-07 RX ORDER — OXYCODONE HYDROCHLORIDE 5 MG/1
10 TABLET ORAL EVERY 4 HOURS PRN
Status: DISCONTINUED | OUTPATIENT
Start: 2023-11-07 | End: 2023-11-09 | Stop reason: HOSPADM

## 2023-11-07 RX ORDER — SODIUM CHLORIDE 0.9 % (FLUSH) 0.9 %
10 SYRINGE (ML) INJECTION EVERY 12 HOURS SCHEDULED
Status: DISCONTINUED | OUTPATIENT
Start: 2023-11-07 | End: 2023-11-09 | Stop reason: HOSPADM

## 2023-11-07 RX ORDER — SUCCINYLCHOLINE/SOD CL,ISO/PF 200MG/10ML
SYRINGE (ML) INTRAVENOUS AS NEEDED
Status: DISCONTINUED | OUTPATIENT
Start: 2023-11-07 | End: 2023-11-07 | Stop reason: SURG

## 2023-11-07 RX ORDER — OXYTOCIN 10 [USP'U]/ML
INJECTION, SOLUTION INTRAMUSCULAR; INTRAVENOUS AS NEEDED
Status: DISCONTINUED | OUTPATIENT
Start: 2023-11-07 | End: 2023-11-07 | Stop reason: SURG

## 2023-11-07 RX ORDER — SIMETHICONE 80 MG
80 TABLET,CHEWABLE ORAL 4 TIMES DAILY PRN
Status: DISCONTINUED | OUTPATIENT
Start: 2023-11-07 | End: 2023-11-09 | Stop reason: HOSPADM

## 2023-11-07 RX ORDER — OXYCODONE HYDROCHLORIDE 5 MG/1
5 TABLET ORAL EVERY 4 HOURS PRN
Status: DISCONTINUED | OUTPATIENT
Start: 2023-11-07 | End: 2023-11-09 | Stop reason: HOSPADM

## 2023-11-07 RX ORDER — OXYTOCIN/0.9 % SODIUM CHLORIDE 30/500 ML
125 PLASTIC BAG, INJECTION (ML) INTRAVENOUS CONTINUOUS PRN
Status: DISCONTINUED | OUTPATIENT
Start: 2023-11-07 | End: 2023-11-09 | Stop reason: HOSPADM

## 2023-11-07 RX ORDER — HYDROCORTISONE 25 MG/G
CREAM TOPICAL 3 TIMES DAILY PRN
Status: DISCONTINUED | OUTPATIENT
Start: 2023-11-07 | End: 2023-11-09 | Stop reason: HOSPADM

## 2023-11-07 RX ORDER — SODIUM CHLORIDE 9 MG/ML
40 INJECTION, SOLUTION INTRAVENOUS AS NEEDED
Status: DISCONTINUED | OUTPATIENT
Start: 2023-11-07 | End: 2023-11-09 | Stop reason: HOSPADM

## 2023-11-07 RX ORDER — ALUMINA, MAGNESIA, AND SIMETHICONE 2400; 2400; 240 MG/30ML; MG/30ML; MG/30ML
15 SUSPENSION ORAL EVERY 4 HOURS PRN
Status: DISCONTINUED | OUTPATIENT
Start: 2023-11-07 | End: 2023-11-09 | Stop reason: HOSPADM

## 2023-11-07 RX ORDER — MORPHINE SULFATE 2 MG/ML
2 INJECTION, SOLUTION INTRAMUSCULAR; INTRAVENOUS
Status: ACTIVE | OUTPATIENT
Start: 2023-11-07 | End: 2023-11-08

## 2023-11-07 RX ORDER — ROCURONIUM BROMIDE 50 MG/5 ML
SYRINGE (ML) INTRAVENOUS AS NEEDED
Status: DISCONTINUED | OUTPATIENT
Start: 2023-11-07 | End: 2023-11-07 | Stop reason: SURG

## 2023-11-07 RX ADMIN — DEXAMETHASONE SODIUM PHOSPHATE 8 MG: 4 INJECTION, SOLUTION INTRA-ARTICULAR; INTRALESIONAL; INTRAMUSCULAR; INTRAVENOUS; SOFT TISSUE at 16:43

## 2023-11-07 RX ADMIN — PROPOFOL 200 MG: 10 INJECTION, EMULSION INTRAVENOUS at 16:25

## 2023-11-07 RX ADMIN — METOCLOPRAMIDE 10 MG: 5 INJECTION, SOLUTION INTRAMUSCULAR; INTRAVENOUS at 16:43

## 2023-11-07 RX ADMIN — Medication 100 MG: at 16:25

## 2023-11-07 RX ADMIN — ACETAMINOPHEN 1000 MG: 500 TABLET, FILM COATED ORAL at 21:07

## 2023-11-07 RX ADMIN — HYDROMORPHONE HYDROCHLORIDE 0.5 MG: 1 INJECTION, SOLUTION INTRAMUSCULAR; INTRAVENOUS; SUBCUTANEOUS at 17:34

## 2023-11-07 RX ADMIN — ONDANSETRON 4 MG: 2 INJECTION INTRAMUSCULAR; INTRAVENOUS at 15:22

## 2023-11-07 RX ADMIN — MORPHINE SULFATE 0.2 MG: 1 INJECTION, SOLUTION EPIDURAL; INTRATHECAL; INTRAVENOUS at 15:55

## 2023-11-07 RX ADMIN — OXYTOCIN 20 UNITS: 10 INJECTION, SOLUTION INTRAMUSCULAR; INTRAVENOUS at 16:16

## 2023-11-07 RX ADMIN — ONDANSETRON 4 MG: 2 INJECTION INTRAMUSCULAR; INTRAVENOUS at 16:58

## 2023-11-07 RX ADMIN — Medication 20 MG: at 16:32

## 2023-11-07 RX ADMIN — ACETAMINOPHEN 1000 MG: 500 TABLET, FILM COATED ORAL at 14:49

## 2023-11-07 RX ADMIN — SODIUM CITRATE AND CITRIC ACID MONOHYDRATE 30 ML: 500; 334 SOLUTION ORAL at 14:49

## 2023-11-07 RX ADMIN — METHYLERGONOVINE MALEATE 200 MCG: 0.2 INJECTION, SOLUTION INTRAMUSCULAR; INTRAVENOUS at 16:35

## 2023-11-07 RX ADMIN — KETOROLAC TROMETHAMINE 30 MG: 30 INJECTION, SOLUTION INTRAMUSCULAR at 17:43

## 2023-11-07 RX ADMIN — Medication 200 MG: at 16:25

## 2023-11-07 RX ADMIN — CEFAZOLIN SODIUM 2 G: 2 SOLUTION INTRAVENOUS at 14:49

## 2023-11-07 RX ADMIN — FAMOTIDINE 20 MG: 10 INJECTION INTRAVENOUS at 14:49

## 2023-11-07 RX ADMIN — SODIUM CHLORIDE, POTASSIUM CHLORIDE, SODIUM LACTATE AND CALCIUM CHLORIDE 125 ML/HR: 600; 310; 30; 20 INJECTION, SOLUTION INTRAVENOUS at 14:26

## 2023-11-07 RX ADMIN — BUPIVACAINE HYDROCHLORIDE 1.6 ML: 7.5 INJECTION, SOLUTION EPIDURAL; RETROBULBAR at 15:55

## 2023-11-07 RX ADMIN — MORPHINE SULFATE 5 MG: 1 INJECTION, SOLUTION EPIDURAL; INTRATHECAL; INTRAVENOUS at 16:45

## 2023-11-07 RX ADMIN — OXYTOCIN 20 UNITS: 10 INJECTION, SOLUTION INTRAMUSCULAR; INTRAVENOUS at 16:36

## 2023-11-07 RX ADMIN — SUGAMMADEX 250 MG: 100 INJECTION, SOLUTION INTRAVENOUS at 17:07

## 2023-11-07 RX ADMIN — SODIUM CHLORIDE, POTASSIUM CHLORIDE, SODIUM LACTATE AND CALCIUM CHLORIDE 2000 ML: 600; 310; 30; 20 INJECTION, SOLUTION INTRAVENOUS at 15:01

## 2023-11-07 RX ADMIN — SODIUM CHLORIDE, POTASSIUM CHLORIDE, SODIUM LACTATE AND CALCIUM CHLORIDE: 600; 310; 30; 20 INJECTION, SOLUTION INTRAVENOUS at 16:36

## 2023-11-07 NOTE — OP NOTE
Reece Carrasco  : 1990  MRN: [id number]  CSN: [id number]    Operative Report    Pre-op Diagnosis:  IUP at 38w0d weeks  Previous  section - not a  candidate  Active labor  BMI 46     Post-op Diagnosis:  Same as above  Dense adhesive disease     Procedure: Procedure(s):   SECTION REPEAT WITH EXTENSIVE LYSIS OF ADHESIONS     Surgeon: Bri Jimenez M.D.     Assist: Staff was responsible for performing the following activities: Retraction, Suction, Irrigation, and Placing Dressing and their skilled assistance was necessary for the success of this case.     OR Staff: Circulator: Malgorzata Madden RN; Bessy Chavez RN  Scrub Person: Rona Godoy; Isatu Theodore  L & KERRIE Nurse: Nona Prabhakar RN  Baby Nurse: Yuko Griffin RN     Anesthesia: Spinal/GETA     Estimated Blood Loss: 1,000 mls     Drains: [place]      ABx: cefazolin 3 gms     Specimens:  Placenta/blood gas     Findings: Dense adhesions of the omentum to the anterior abdominal wall.  Dense adhesions of the uterus; predominately left side from round ligament to IP ligament adhered to the anterior abdominal wall.  Small infant in the vertex presentation with nuchal cord x1.  Placenta is small and calcified.     Complications: none       Procedure Details:   After the appropriate time out, the patient was prepped and draped in the usual sterile fashion.  She had been placed in the dorsal supine left lateral tilt position.  A madrid catheter had been placed in the bladder for drainage during the procedure. A pfannenstiel skin incision was made with a knife and carried down to the fascia.  The fascia was nicked with a scalpel and the incision was extended bilaterally with curved Izquierdo scissors. The superior aspect of the fascia was grasped with Kocher clamps x 2 and bluntly as well as sharply dissected away from the underlying rectus muscles.  A similar process was repeated inferiorly. The rectus muscles  were  and the peritoneal cavity was entered sharply without complications.  There was noted to be dense adhesions of the uterus to the anterior abdominal wall.  There were also adhesions of the omentum to the anterior abdominal wall.  These were taken down sharply.  The adhesions of the uterus were also taken down sharply in order to insert the Lucio O ring retractor.  The retractor could not be inserted however completely secondary to the dense adhesions.  The bladder flap had been created with Metzenbaum scissors and pickups with teeth.  A Sunshine retractor and bladder blade was then inserted.  A transverse uterine incision was made with the knife and extended bilaterally.  The infant was delivered from the vertex presentation.  The mouth and nose were bulb suctioned.  The cord was doubly clamped and cut and the infant handed to the pediatric nurse in attendance.  A segment of cord was reserved for blood gas.   Cord blood was obtained.  The placenta was manually extracted from the uterus.  The uterus was then attempted to be elevated from the abdomen.  There were again noted to be dense adhesions of the round ligament as well as left lateral aspect of the uterus and IP ligament to the anterior abdominal wall.  Meticulous lysis of adhesions was performed in order to free the uterus and repair the hysterotomy site.  The uterus was then able to be successfully elevated from the abdomen.  The uterine incision was closed with #1 chromic in a running locking fashion with a second imbricating stitch.  The uterus had been returned to the abdomen.  The lateral gutters were wiped free of remaining clots.  The site of surgical incision was inspected and noted to be hemostatic.  Gelfoam was then placed over the hysterotomy site to ensure additional hemostasis.  The patient did have mild uterine atony as well.  She received IV Pitocin, uterine massage, and Methergine IM x1.  The subfascial tissue was inspected and  noted to be hemostatic.  The fascia was closed with 0 PDS in a running non-locking fashion.  Subcutaneous tissue was inspected and noted to be hemostatic with minimal bovie electrocautery.  Jennifer's fascia was closed with 3-0 chromic in a running non-locking fashion.  The skin was approximated with 4-0 Vicryl in a subcuticular fashion.  Skin glue and Steri-Strips were placed.  Acquacel dressing was placed.  All instrument and sponge counts were correct at the end of the procedure. The patient tolerated the procedure well.  There were no complications.  She was taken to postoperative recovery room in stable condition.      Complications:   None      Disposition:   Mother to PACU in stable condition currently.   Baby to NBN  in stable condition currently.     Bri Jimenez M.D.  11/7/2023  17:16 EST

## 2023-11-07 NOTE — ANESTHESIA PROCEDURE NOTES
Airway  Urgency: elective    Date/Time: 11/7/2023 4:26 PM  Airway not difficult    General Information and Staff    Patient location during procedure: OR  CRNA/CAA: John Issa CRNA    Indications and Patient Condition  Indications for airway management: airway protection    Preoxygenated: yes  Mask difficulty assessment: 0 - not attempted    Final Airway Details  Final airway type: endotracheal airway      Successful airway: ETT  Cuffed: yes   Successful intubation technique: direct laryngoscopy and RSI  Facilitating devices/methods: cricoid pressure  Endotracheal tube insertion site: oral  Blade: Glidescope  Blade size: 3  ETT size (mm): 7.0  Cormack-Lehane Classification: grade I - full view of glottis  Placement verified by: chest auscultation and capnometry   Measured from: teeth  ETT/EBT  to teeth (cm): 21  Number of attempts at approach: 1  Assessment: lips, teeth, and gum same as pre-op and atraumatic intubation

## 2023-11-07 NOTE — ANESTHESIA POSTPROCEDURE EVALUATION
Patient: Lauren Carrasco    Procedure Summary       Date: 23 Room / Location: [facility] OR 1 /  [facility] OR    Anesthesia Start: 152 Anesthesia Stop:     Procedure:  SECTION REPEAT (Abdomen) Diagnosis:       Hx of  section      (Hx of  section [Z98.891])    Surgeons: Bri Jimenez MD Provider: John Issa CRNA    Anesthesia Type: spinal ASA Status: 3 - Emergent            Anesthesia Type: spinal    Vitals  Vitals Value Taken Time   /89 23 1830   Temp 97.5 °F (36.4 °C) 23 1724   Pulse 61 23 1833   Resp 18 23 1740   SpO2 98 % 23 1833   Vitals shown include unfiled device data.        Post Anesthesia Care and Evaluation    Patient location during evaluation: PACU  Patient participation: complete - patient participated  Level of consciousness: awake and alert  Pain management: adequate    Airway patency: patent  Anesthetic complications: No anesthetic complications  PONV Status: none  Cardiovascular status: acceptable  Respiratory status: acceptable  Hydration status: acceptable  No anesthesia care post op

## 2023-11-07 NOTE — OUTREACH NOTE
[facility]  Check-In    Current Estimated Gestational Age: 37w6d      Questions/Answers      Flowsheet Row Responses   Best Method for Contacting Cell   Demographics Reviewed Yes   Currently Employed No   Able to keep appointments as scheduled Yes   Gender(s) and Name(s) Marco- Kwadwo Carrasco   Baby Active/Feeling Fetal Movemen Yes   How are you presently feeling? Feeling fine   Questions regarding prenatal visits or tests to be ordered? No   Education related to new diagnoses/home equipment No   Supplies ready for baby Breast Pump, Car Seat, Clothing, Crib, Diapers, Feeding Supplies   Resource/Environmental Concerns None   Do you have any questions related to your care experience, your pregnancy, plans for delivery, any concerns, etc? No              RN met in office. NBN teaching done today and final paperwork completed. Pt states she wants to see if  can be in OR for spinal. RN states usually they let support person back after spinal but we can ask and see if they will allow this. Pt denies any questions or concerns at this time.       GUILLERMO Ralph  Maternity Nurse Navigator    11/06/2023 1100 EST

## 2023-11-07 NOTE — ANESTHESIA PROCEDURE NOTES
Spinal Block      Patient reassessed immediately prior to procedure    Patient location during procedure: OR  Start Time: 11/7/2023 3:30 PM  Stop Time: 11/7/2023 3:59 PM  Indication:at surgeon's request and procedure for pain  Performed By  CRNA/CAA: Hilario Kauffman CRNA  Preanesthetic Checklist  Completed: patient identified, IV checked, site marked, risks and benefits discussed, surgical consent, monitors and equipment checked, pre-op evaluation and timeout performed  Spinal Block Prep:  Patient Position:sitting  Sterile Tech:cap, gloves, mask and sterile barriers  Prep:Chloraprep  Patient Monitoring:blood pressure monitoring, continuous pulse oximetry and EKG    Spinal Block Procedure  Approach:midline  Guidance:landmark technique and palpation technique  Location:L3-L4  Needle Type:Pencan  Needle Gauge:27 G  Placement of Spinal needle event:cerebrospinal fluid aspirated  Paresthesia: left and transient  Fluid Appearance:clear  Medications: bupivacaine PF (MARCAINE) injection 0.75% - Epidural   1.6 mL - 11/7/2023 3:55:00 PM   Post Assessment  Patient Tolerance:patient tolerated the procedure well with no apparent complications  Complications no  Additional Notes  Risks discussed , including but not limited to:  Headache, itching, n&v, infection, failure, decreased blood pressure, permanent/chronic back pain, nerve damage, paralysis, etc. All questions answered and informed consent obtained. Skin localized with lidocaine skin wheel. 1.6ml 0.75% Marcaine with dextrose intrathecal, 0.2 mg duramorph added to spinal

## 2023-11-07 NOTE — NON STRESS TEST
Lauren Calderascott Luna, a  at 38w0d with an SILVIO of 2023, by Last Menstrual Period, was seen at [facility] for a nonstress test.    Chief Complaint   Patient presents with    Contractions     Been 3 to 4 minutes apart for last hour        Patient Active Problem List   Diagnosis    Hx of  section    Previous  section    Pregnancy    S/P  section       Start Time: 1330  Stop Time: 1400    Interpretation A  Nonstress Test Interpretation A: Reactive

## 2023-11-07 NOTE — H&P
[facility]  Lauren Carrasco  : 1990  MRN: [id number]  CSN: [id number]    History and Physical    Chief Complaint   Patient presents with    Contractions     Been 3 to 4 minutes apart for last hour       Subjective   Lauren Carrasco is a 33 y.o. year old  with an Estimated Date of Delivery: 23 currently at 38w0d presenting with irregular contractions and leaking fluid. She has been having irregular ctxs for the past week but states they worsened last night. She states she also thought she was leaking some fluid. Baby has been active.    Prenatal care has been with [facility].  It has been complicated by anemia. First PNV on 23 @ 11 weeks with 11 visits. Weight gain = 29 lbs.    OB History    Para Term  AB Living   4 1 1 0 2 1   SAB IAB Ectopic Molar Multiple Live Births   1 0 0 0 0 0      # Outcome Date GA Lbr Juan Carlos/2nd Weight Sex Delivery Anes PTL Lv   4 Current            3 SAB  5w0d    SAB      2 AB  7w0d          1 Term 07   3118 g (6 lb 14 oz) F CS-LTranv         Birth Comments: dilated to 5 cm     Past Medical History:   Diagnosis Date    Abnormal Pap smear of cervix     Hypertension     Pregnancy 2023    Recurrent pregnancy loss, antepartum condition or complication 2021/2022     Past Surgical History:   Procedure Laterality Date    ADENOIDECTOMY       SECTION         No Known Allergies  Social History    Tobacco Use      Smoking status: Never      Smokeless tobacco: Never    Review of Systems   Constitutional: Negative.    Respiratory: Negative.     Cardiovascular: Negative.    Gastrointestinal: Negative.    Genitourinary: Negative.    Musculoskeletal: Negative.    Neurological: Negative.    Psychiatric/Behavioral: Negative.     All other systems reviewed and are negative.        Objective   /70 (BP Location: Right arm, Patient Position: Lying)   Pulse 87   Temp 97.9 °F (36.6 °C) (Oral)    "Resp 18   Ht 165.1 cm (65\")   Wt 126 kg (278 lb)   LMP 2023   SpO2 99%   BMI 46.26 kg/m²   General: well developed; well nourished  no acute distress   Neck:    Heart:   supple and no masses  normal apical impulse  regular rate and rhythm   Lungs: breathing is unlabored  clear to auscultation bilaterally   Abdomen: Gravid and nontender  EFW: growth scan @ 32 weeks=56%, AC 57%, fundal placenta   FHT's: reassuring, reactive, and category 1   Cervix: was checked (by me): 3-4 cm / 70 % / -2   Presentation: cephalic   Contractions: irregular - external monitors used   Extremities:   normal appearance with no cyanosis or edema and no calf tenderness   Skin:  Skin color, texture, turgor normal. No rashes or lesions or Skin warm and dry   Psych:  Normal. and Alert and oriented, appropriate affect.     Prenatal Labs  Lab Results   Component Value Date    HGB 12.0 2023    HEPBSAG Negative 2023    ABSCRN Negative 2023    [id number] Non Reactive 2023    HEPCVIRUSABY Non Reactive 2023    STREPGPB Negative 10/30/2023       Current Labs Reviewed   Lab Results (last 24 hours)       ** No results found for the last 24 hours. **               ASSESSMENT  IUP at 38w0d  Group B strep status: negative  Reactive NST  Early labor    PLAN  Admit to   2.   Repeat  section per Dr Barbara Brown, APRN  2023  14:14 EST      "

## 2023-11-07 NOTE — PLAN OF CARE
Goal Outcome Evaluation:  Plan of Care Reviewed With: patient, spouse        Progress: improving  Outcome Evaluation: Patient admitted for labor and history of previous  delivery. Patient 3-4 centimeters dilated. Repeat  Section called per Dr. Bri Jimenez. Patient had spinal and then general anesthesia. Patient VSS. Infant had true know in umbiblical cord.

## 2023-11-07 NOTE — ANESTHESIA PREPROCEDURE EVALUATION
Anesthesia Evaluation     Patient summary reviewed and Nursing notes reviewed   NPO Solid Status: Waived due to emergency  NPO Liquid Status: Waived due to emergency           Airway   Mallampati: II  TM distance: >3 FB  Neck ROM: full  Possible difficult intubation  Dental - normal exam         Pulmonary - negative pulmonary ROS and normal exam   Cardiovascular - negative cardio ROS and normal exam  Exercise tolerance: good (4-7 METS)    (+) hypertension      Neuro/Psych- negative ROS  GI/Hepatic/Renal/Endo - negative ROS   (+) obesity, morbid obesity    Musculoskeletal (-) negative ROS    Abdominal   (-) obese   Substance History - negative use     OB/GYN negative ob/gyn ROS   (+) Pregnant        Other        ROS/Med Hx Other: Pt had chicken nugget at 12:30 with fries. Discussed with patient if a spinal is not sufficient for the  no sedation or pain medicine will be given and a conversion to general anesthesia will be made. Pt is agreeable to this plan                Anesthesia Plan    ASA 3 - emergent     spinal       Anesthetic plan, risks, benefits, and alternatives have been provided, discussed and informed consent has been obtained with: patient.  Pre-procedure education provided    CODE STATUS:    Level Of Support Discussed With: Patient  Code Status (Patient has no pulse and is not breathing): CPR (Attempt to Resuscitate)  Medical Interventions (Patient has pulse or is breathing): Full Support

## 2023-11-08 LAB
BACTERIA SPEC AEROBE CULT: NORMAL
BASOPHILS # BLD AUTO: 0.03 10*3/MM3 (ref 0–0.2)
BASOPHILS NFR BLD AUTO: 0.2 % (ref 0–1.5)
DEPRECATED RDW RBC AUTO: 43.2 FL (ref 37–54)
EOSINOPHIL # BLD AUTO: 0.02 10*3/MM3 (ref 0–0.4)
EOSINOPHIL NFR BLD AUTO: 0.1 % (ref 0.3–6.2)
ERYTHROCYTE [DISTWIDTH] IN BLOOD BY AUTOMATED COUNT: 14.2 % (ref 12.3–15.4)
HCT VFR BLD AUTO: 28.2 % (ref 34–46.6)
HGB BLD-MCNC: 9.5 G/DL (ref 12–15.9)
IMM GRANULOCYTES # BLD AUTO: 0.15 10*3/MM3 (ref 0–0.05)
IMM GRANULOCYTES NFR BLD AUTO: 1 % (ref 0–0.5)
LYMPHOCYTES # BLD AUTO: 1.57 10*3/MM3 (ref 0.7–3.1)
LYMPHOCYTES NFR BLD AUTO: 10 % (ref 19.6–45.3)
MCH RBC QN AUTO: 28.6 PG (ref 26.6–33)
MCHC RBC AUTO-ENTMCNC: 33.7 G/DL (ref 31.5–35.7)
MCV RBC AUTO: 84.9 FL (ref 79–97)
MONOCYTES # BLD AUTO: 0.86 10*3/MM3 (ref 0.1–0.9)
MONOCYTES NFR BLD AUTO: 5.5 % (ref 5–12)
NEUTROPHILS NFR BLD AUTO: 13 10*3/MM3 (ref 1.7–7)
NEUTROPHILS NFR BLD AUTO: 83.2 % (ref 42.7–76)
NRBC BLD AUTO-RTO: 0 /100 WBC (ref 0–0.2)
PLATELET # BLD AUTO: 217 10*3/MM3 (ref 140–450)
PMV BLD AUTO: 10.8 FL (ref 6–12)
RBC # BLD AUTO: 3.32 10*6/MM3 (ref 3.77–5.28)
WBC NRBC COR # BLD: 15.63 10*3/MM3 (ref 3.4–10.8)

## 2023-11-08 PROCEDURE — 85025 COMPLETE CBC W/AUTO DIFF WBC: CPT | Performed by: OBSTETRICS & GYNECOLOGY

## 2023-11-08 RX ORDER — IBUPROFEN 800 MG/1
800 TABLET ORAL EVERY 8 HOURS SCHEDULED
Status: DISCONTINUED | OUTPATIENT
Start: 2023-11-08 | End: 2023-11-09 | Stop reason: HOSPADM

## 2023-11-08 RX ORDER — FERROUS SULFATE 324(65)MG
324 TABLET, DELAYED RELEASE (ENTERIC COATED) ORAL 2 TIMES DAILY WITH MEALS
Status: DISCONTINUED | OUTPATIENT
Start: 2023-11-08 | End: 2023-11-09 | Stop reason: HOSPADM

## 2023-11-08 RX ADMIN — ACETAMINOPHEN 1000 MG: 500 TABLET, FILM COATED ORAL at 03:07

## 2023-11-08 RX ADMIN — IBUPROFEN 800 MG: 800 TABLET ORAL at 00:46

## 2023-11-08 RX ADMIN — FERROUS SULFATE TAB EC 324 MG (65 MG FE EQUIVALENT) 324 MG: 324 (65 FE) TABLET DELAYED RESPONSE at 09:38

## 2023-11-08 RX ADMIN — ACETAMINOPHEN 650 MG: 325 TABLET, FILM COATED ORAL at 21:06

## 2023-11-08 RX ADMIN — IBUPROFEN 800 MG: 800 TABLET ORAL at 23:32

## 2023-11-08 RX ADMIN — ACETAMINOPHEN 1000 MG: 500 TABLET, FILM COATED ORAL at 18:03

## 2023-11-08 RX ADMIN — FERROUS SULFATE TAB EC 324 MG (65 MG FE EQUIVALENT) 324 MG: 324 (65 FE) TABLET DELAYED RESPONSE at 18:04

## 2023-11-08 RX ADMIN — DOCUSATE SODIUM 100 MG: 100 CAPSULE, LIQUID FILLED ORAL at 09:37

## 2023-11-08 RX ADMIN — IBUPROFEN 800 MG: 800 TABLET ORAL at 13:46

## 2023-11-08 RX ADMIN — ACETAMINOPHEN 1000 MG: 500 TABLET, FILM COATED ORAL at 09:38

## 2023-11-08 RX ADMIN — PRENATAL VITAMINS-IRON FUMARATE 27 MG IRON-FOLIC ACID 0.8 MG TABLET 1 TABLET: at 09:37

## 2023-11-08 RX ADMIN — OXYCODONE HYDROCHLORIDE 5 MG: 5 TABLET ORAL at 01:59

## 2023-11-08 RX ADMIN — IBUPROFEN 800 MG: 800 TABLET ORAL at 06:12

## 2023-11-08 RX ADMIN — OXYCODONE HYDROCHLORIDE 10 MG: 5 TABLET ORAL at 13:53

## 2023-11-08 NOTE — PAYOR COMM NOTE
"TO:[employer] MK  FROM:BESS DEL TORO, RN PHONE [phone]  IN CLINICALS REF# [id number]    Lauren Carrasco (33 y.o. Female)       Date of Birth   1990    Social Security Number       Address    [facility] [place] [address]    Home Phone   [phone]    MRN   [id number]       Latter-day   None    Marital Status                               Admission Date   23    Admission Type   Elective    Admitting Provider   Magaly Brown CNM    Attending Provider   Magaly Brown CNM    Department, Room/Bed   [facility], W2       Discharge Date       Discharge Disposition       Discharge Destination                                 Attending Provider: Magaly Brown CNM    Allergies: No Known Allergies    Isolation: None   Infection: None   Code Status: CPR    Ht: 165.1 cm (65\")   Wt: 126 kg (278 lb)    Admission Cmt: None   Principal Problem: Hx of  section [Z98.891]                   Active Insurance as of 2023       Primary Coverage       Payor Plan Insurance Group Employer/Plan Group    [place] [id number]       Payor Plan Address Payor Plan Phone Number Payor Plan Fax Number Effective Dates    [employer] [address] [address] [phone]  3/1/2023 - None Entered    [facility] [id number]         Subscriber Name Subscriber Birth Date Member ID       LAUREN CARRASCO 1990 [id number]                     Emergency Contacts        (Rel.) Home Phone Work Phone Mobile Phone    Cal Carrasco (Spouse) -- -- [phone]                 History & Physical        Bri Jimenez MD at 23          H&P reviewed. The patient was examined and there are no changes to the H&P.    Electronically signed by Bri Jimenez MD at 23   Source Note       Attestation signed by Bri Jimenez MD at 23    I have reviewed this documentation and agree.          "         [facility]  Lauren Carrasco  : 1990  MRN: [id number]  CSN: [id number]    History and Physical    Chief Complaint   Patient presents with    Contractions     Been 3 to 4 minutes apart for last hour       Subjective  Lauren Carrasco is a 33 y.o. year old  with an Estimated Date of Delivery: 23 currently at 38w0d presenting with irregular contractions and leaking fluid. She has been having irregular ctxs for the past week but states they worsened last night. She states she also thought she was leaking some fluid. Baby has been active.    Prenatal care has been with [facility].  It has been complicated by anemia. First PNV on 23 @ 11 weeks with 11 visits. Weight gain = 29 lbs.    OB History    Para Term  AB Living   4 1 1 0 2 1   SAB IAB Ectopic Molar Multiple Live Births   1 0 0 0 0 0      # Outcome Date GA Lbr Juan Carlos/2nd Weight Sex Delivery Anes PTL Lv   4 Current            3 SAB  5w0d    SAB      2 AB  7w0d          1 Term 07   3118 g (6 lb 14 oz) F CS-LTranv         Birth Comments: dilated to 5 cm     Past Medical History:   Diagnosis Date    Abnormal Pap smear of cervix     Hypertension     Pregnancy 2023    Recurrent pregnancy loss, antepartum condition or complication 2021/2022     Past Surgical History:   Procedure Laterality Date    ADENOIDECTOMY       SECTION         No Known Allergies  Social History    Tobacco Use      Smoking status: Never      Smokeless tobacco: Never    Review of Systems   Constitutional: Negative.    Respiratory: Negative.     Cardiovascular: Negative.    Gastrointestinal: Negative.    Genitourinary: Negative.    Musculoskeletal: Negative.    Neurological: Negative.    Psychiatric/Behavioral: Negative.     All other systems reviewed and are negative.        Objective  /70 (BP Location: Right arm, Patient Position: Lying)   Pulse 87   Temp 97.9 °F (36.6 °C) (Oral)   " Resp 18   Ht 165.1 cm (65\")   Wt 126 kg (278 lb)   LMP 2023   SpO2 99%   BMI 46.26 kg/m²   General: well developed; well nourished  no acute distress   Neck:    Heart:   supple and no masses  normal apical impulse  regular rate and rhythm   Lungs: breathing is unlabored  clear to auscultation bilaterally   Abdomen: Gravid and nontender  EFW: growth scan @ 32 weeks=56%, AC 57%, fundal placenta   FHT's: reassuring, reactive, and category 1   Cervix: was checked (by me): 3-4 cm / 70 % / -2   Presentation: cephalic   Contractions: irregular - external monitors used   Extremities:   normal appearance with no cyanosis or edema and no calf tenderness   Skin:  Skin color, texture, turgor normal. No rashes or lesions or Skin warm and dry   Psych:  Normal. and Alert and oriented, appropriate affect.     Prenatal Labs  Lab Results   Component Value Date    HGB 12.0 2023    HEPBSAG Negative 2023    ABSCRN Negative 2023    [id number] Non Reactive 2023    HEPCVIRUSABY Non Reactive 2023    STREPGPB Negative 10/30/2023       Current Labs Reviewed   Lab Results (last 24 hours)       ** No results found for the last 24 hours. **               ASSESSMENT  IUP at 38w0d  Group B strep status: negative  Reactive NST  Early labor    PLAN  Admit to   2.   Repeat  section per Dr Barbara Brown, ADAM  2023  14:14 EST        Electronically signed by Bri Jimenez MD at 23 1512                 Magaly Brown CNM at 23 1414       Attestation signed by Bri Jimenez MD at 23 1512    I have reviewed this documentation and agree.                   Reece Carrasco  : 1990  MRN: [id number]  CSN: [id number]    History and Physical    Chief Complaint   Patient presents with    Contractions     Been 3 to 4 minutes apart for last hour       Subjective  Lauren Carrasco is a 33 y.o. year old  with an Estimated " "Date of Delivery: 23 currently at 38w0d presenting with irregular contractions and leaking fluid. She has been having irregular ctxs for the past week but states they worsened last night. She states she also thought she was leaking some fluid. Baby has been active.    Prenatal care has been with [facility].  It has been complicated by anemia. First PNV on 23 @ 11 weeks with 11 visits. Weight gain = 29 lbs.    OB History    Para Term  AB Living   4 1 1 0 2 1   SAB IAB Ectopic Molar Multiple Live Births   1 0 0 0 0 0      # Outcome Date GA Lbr Juan Carlos/2nd Weight Sex Delivery Anes PTL Lv   4 Current            3 SAB  5w0d    SAB      2 AB  7w0d          1 Term 07   3118 g (6 lb 14 oz) F CS-LTranv         Birth Comments: dilated to 5 cm     Past Medical History:   Diagnosis Date    Abnormal Pap smear of cervix     Hypertension     Pregnancy 2023    Recurrent pregnancy loss, antepartum condition or complication 2021/2022     Past Surgical History:   Procedure Laterality Date    ADENOIDECTOMY       SECTION         No Known Allergies  Social History    Tobacco Use      Smoking status: Never      Smokeless tobacco: Never    Review of Systems   Constitutional: Negative.    Respiratory: Negative.     Cardiovascular: Negative.    Gastrointestinal: Negative.    Genitourinary: Negative.    Musculoskeletal: Negative.    Neurological: Negative.    Psychiatric/Behavioral: Negative.     All other systems reviewed and are negative.        Objective  /70 (BP Location: Right arm, Patient Position: Lying)   Pulse 87   Temp 97.9 °F (36.6 °C) (Oral)   Resp 18   Ht 165.1 cm (65\")   Wt 126 kg (278 lb)   LMP 2023   SpO2 99%   BMI 46.26 kg/m²   General: well developed; well nourished  no acute distress   Neck:    Heart:   supple and no masses  normal apical impulse  regular rate and rhythm   Lungs: breathing is unlabored  clear to auscultation bilaterally "   Abdomen: Gravid and nontender  EFW: growth scan @ 32 weeks=56%, AC 57%, fundal placenta   FHT's: reassuring, reactive, and category 1   Cervix: was checked (by me): 3-4 cm / 70 % / -2   Presentation: cephalic   Contractions: irregular - external monitors used   Extremities:   normal appearance with no cyanosis or edema and no calf tenderness   Skin:  Skin color, texture, turgor normal. No rashes or lesions or Skin warm and dry   Psych:  Normal. and Alert and oriented, appropriate affect.     Prenatal Labs  Lab Results   Component Value Date    HGB 12.0 2023    HEPBSAG Negative 2023    ABSCRN Negative 2023    BQA3SDQ1 Non Reactive 2023    HEPCVIRUSABY Non Reactive 2023    STREPGPB Negative 10/30/2023       Current Labs Reviewed   Lab Results (last 24 hours)       ** No results found for the last 24 hours. **               ASSESSMENT  IUP at 38w0d  Group B strep status: negative  Reactive NST  Early labor    PLAN  Admit to   2.   Repeat  section per Dr Barbara Brown, APRN  2023  14:14 EST        Electronically signed by Bri Jimenez MD at 23 1512       Operative/Procedure Notes (last 24 hours)  Notes from 23 through 23   No notes of this type exist for this encounter.       Physician Progress Notes (last 24 hours)  Notes from 23 through 23   No notes of this type exist for this encounter.

## 2023-11-08 NOTE — PROGRESS NOTES
[facility]   PROGRESS NOTE    Post-Op Day 1 S/P   Subjective   Subjective  Patient reports:  Pain is well controlled. She is ambulating. Tolerating diet.  Voiding - without difficulty; flatus reported..  Vaginal bleeding is as much as expected.    Objective    Objective     Vitals: Vital Signs Range for the last 24 hours  Temperature: Temp:  [97.5 °F (36.4 °C)-98.8 °F (37.1 °C)] 98.8 °F (37.1 °C)   Temp Source: Temp src: Oral   BP: BP: ()/(57-91) 134/71   Pulse: Heart Rate:  [57-87] 66   Respirations: Resp:  [16-18] 16   SPO2: SpO2:  [95 %-100 %] 95 %   O2 Amount (l/min): Flow (L/min):  [6] 6   O2 Devices Device (Oxygen Therapy): room air   Weight: Weight:  [126 kg (278 lb)] 126 kg (278 lb)            Physical Exam    Lungs Respirations even and unlabored   Abdomen Soft, non-tender,    Incision  no drainage   Extremities extremities normal, atraumatic, no cyanosis or edema     I reviewed the patient's new clinical results. Hgb 9.5 Hct 28.2    Assessment & Plan        Hx of  section    S/P  section    Assessment & Plan    Assessment:    Lauren Carrasco is Day 1  post-partum  , Low Transverse   .    Anemia    Plan:  continue post op care. Iron supplement      Celeste Medina PA-C  23  08:35 EST

## 2023-11-08 NOTE — PLAN OF CARE
Goal Outcome Evaluation:  Plan of Care Reviewed With: patient, spouse        Progress: improving     V/S stable, pain controlled with meds., breastfeeding and bonding with baby.

## 2023-11-09 ENCOUNTER — PATIENT OUTREACH (OUTPATIENT)
Dept: LABOR AND DELIVERY | Facility: HOSPITAL | Age: 33
End: 2023-11-09
Payer: MEDICAID

## 2023-11-09 VITALS
HEART RATE: 64 BPM | SYSTOLIC BLOOD PRESSURE: 145 MMHG | WEIGHT: 278 LBS | OXYGEN SATURATION: 92 % | DIASTOLIC BLOOD PRESSURE: 90 MMHG | TEMPERATURE: 97.8 F | BODY MASS INDEX: 46.32 KG/M2 | HEIGHT: 65 IN | RESPIRATION RATE: 17 BRPM

## 2023-11-09 LAB — REF LAB TEST METHOD: NORMAL

## 2023-11-09 PROCEDURE — 0503F POSTPARTUM CARE VISIT: CPT | Performed by: MIDWIFE

## 2023-11-09 RX ORDER — ACETAMINOPHEN 500 MG
1000 TABLET ORAL EVERY 8 HOURS PRN
Qty: 60 TABLET | Refills: 0 | Status: SHIPPED | OUTPATIENT
Start: 2023-11-09

## 2023-11-09 RX ORDER — IBUPROFEN 800 MG/1
800 TABLET ORAL EVERY 8 HOURS PRN
Qty: 60 TABLET | Refills: 0 | Status: SHIPPED | OUTPATIENT
Start: 2023-11-09

## 2023-11-09 RX ORDER — OXYCODONE HYDROCHLORIDE 5 MG/1
5 TABLET ORAL EVERY 6 HOURS PRN
Qty: 12 TABLET | Refills: 0 | Status: SHIPPED | OUTPATIENT
Start: 2023-11-09 | End: 2023-11-14

## 2023-11-09 RX ADMIN — IBUPROFEN 800 MG: 800 TABLET ORAL at 07:21

## 2023-11-09 RX ADMIN — PRENATAL VITAMINS-IRON FUMARATE 27 MG IRON-FOLIC ACID 0.8 MG TABLET 1 TABLET: at 09:01

## 2023-11-09 RX ADMIN — FERROUS SULFATE TAB EC 324 MG (65 MG FE EQUIVALENT) 324 MG: 324 (65 FE) TABLET DELAYED RESPONSE at 09:01

## 2023-11-09 RX ADMIN — ACETAMINOPHEN 650 MG: 325 TABLET, FILM COATED ORAL at 09:01

## 2023-11-09 NOTE — DISCHARGE SUMMARY
Discharge Summary     Reece Carrasco  : 1990  MRN: [id number]  CSN: [id number]    Date of Admission: 2023   Date of Discharge:  2023   Delivering Physician: Bri Jimenez MD       Admission Diagnosis: S/P  section [Z98.891]   Discharge Diagnosis: Same as above plus  Pregnancy at 38w0d - delivered       Procedures: Repeat  (LTCS)     Hospital Course  See the completed operative report for details regarding antepartum course and delivery.    Her post-operative course was unremarkable.  On POD # 2 she felt like she was ready for D/C.  She was evaluated and it was determined she was able to be discharged to home.  She had no febrile morbidity. She had normal bowel and bladder function and was hemodynamically stable.  Her wound was healing well without obvious signs of infections. She is breast feeding  She is planning condoms for birth control.    Infant  male  fetus weighing 2603 g (5 lb 11.8 oz)   Apgars -  2 @ 1 minute /  4 @ 5 minutes.    Discharge labs  Lab Results   Component Value Date    WBC 15.63 (H) 2023    HGB 9.5 (L) 2023    HCT 28.2 (L) 2023     2023       Discharge Medications     Discharge Medications        New Medications        Instructions Start Date   acetaminophen 500 MG tablet  Commonly known as: TYLENOL   1,000 mg, Oral, Every 8 Hours PRN      ibuprofen 800 MG tablet  Commonly known as: ADVIL,MOTRIN   800 mg, Oral, Every 8 Hours PRN      oxyCODONE 5 MG immediate release tablet  Commonly known as: ROXICODONE   5 mg, Oral, Every 6 Hours PRN             Continue These Medications        Instructions Start Date   ferrous sulfate 325 (65 FE) MG tablet   325 mg, Oral, 2 Times Daily Before Meals      prenatal (CLASSIC) vitamin 28-0.8 MG tablet tablet  Generic drug: prenatal vitamin   Oral, Daily      Prenatal Vitamin 27-0.8 MG tablet   1 tablet, Oral, Daily             Stop These Medications      loperamide 2 MG  capsule  Commonly known as: IMODIUM     ondansetron ODT 4 MG disintegrating tablet  Commonly known as: ZOFRAN-ODT     pseudoephedrine 120 MG 12 hr tablet  Commonly known as: Sudafed 12 Hour     Unisom SleepTabs 25 MG tablet  Generic drug: doxylamine     vitamin B-6 25 MG tablet  Commonly known as: PYRIDOXINE              Discharge Disposition Home or Self Care   Condition on Discharge: good   Follow-up: 1 week with [facility].     Time spent on discharge: 30 minutes or less  Magaly Brown, APRN  11/9/2023

## 2023-11-09 NOTE — PLAN OF CARE
Goal Outcome Evaluation:              Outcome Evaluation: VSS.  Pain well controlled with medication.  Bonding well.  + flatus.  Discharge home today

## 2023-11-09 NOTE — OUTREACH NOTE
"[facility]  IP Postpartum    Questions/Answers      Flowsheet Row Responses   Best Method for Contacting Cell   Support Person Present Yes   Does the patient have a car seat at the hospital Yes   Delivery Note Reviewed Reviewed   Were birth expectations met? Yes   Is there a need for additional support/resources? No   Lactation Note Reviewed Reviewed   Is additional support needed? No   Any questions or concerns? No   Is the patient going to use Meds to Beds? Yes   Any concerns related discharge meds/ability to  prescriptions? No   OB Discharge Navigator Reviewed  Reviewed   Confirm Postpartum OB appointment Yes   Confirm initial well-child Pediatrician appointment date/time: Yes   Additional post-discharge F/U appointments No   Does patient have transportation to appointments? Yes   Any other assistance needed to ensure she is able to attend appointments? No   Does patient have supplies needed at home for  care? Breast Pump, Clothing, Crib, Diapers, Formula          Pt is currently breastfeeding. Pt states breastfeeding is going well \"for the most part\". Pt states baby will latch well some moments and others he struggles. Pt states Kae, Lactation Couselor, worked with her yesterday and everything is going well for the moment. Pt is being D\C today and states she has everything she needs for discharge for her and baby boy Kwadwo Hager. RN to follow up in 1-2 weeks for PP check in.     GUILLERMO Ralph  Maternity Nurse Navigator    2023, 11:55 EST                "

## 2023-11-09 NOTE — PLAN OF CARE
Goal Outcome Evaluation:                        Problem: Adult Inpatient Plan of Care  Goal: Plan of Care Review  Outcome: Ongoing, Progressing  Flowsheets (Taken 11/9/2023 2518)  Progress: improving  Plan of Care Reviewed With: patient  Outcome Evaluation: VSS, voiding without difficulty, lochia scant and no ordor, bonding with infant, pain at a minimum controlled with tylenol and motrin. planning on being discharged later today.

## 2023-11-10 NOTE — PAYOR COMM NOTE
"To:  [employer]  From: Angelika Garza RN  Phone: [phone]  Fax: [phone]  NPI: [id number]  TIN: [id number]  Member ID: [id number]   MRN: [id number]    Lauren Carrasco (33 y.o. Female)       Date of Birth   1990    Social Security Number       Address   [address] [address]    Home Phone   [phone]    MRN   [id number]       [place]   None    Marital Status                               Admission Date   23    Admission Type   Elective    Admitting Provider   Magaly Brown CNM    Attending Provider       Department, Room/Bed   [facility], W2       Discharge Date   2023    Discharge Disposition   Home or Self Care    Discharge Destination   Home                              Attending Provider: (none)   Allergies: No Known Allergies    Isolation: None   Infection: None   Code Status: Prior    Ht: 165.1 cm (65\")   Wt: 126 kg (278 lb)    Admission Cmt: None   Principal Problem: Hx of  section [Z98.891]                   Active Insurance as of 2023       Primary Coverage       Payor Plan Insurance Group Employer/Plan Group    [place] [id number]       Payor Plan Address Payor Plan Phone Number Payor Plan Fax Number Effective Dates    [employer] [address] [address] [phone]  3/1/2023 - None Entered    [facility] [address]         Subscriber Name Subscriber Birth Date Member ID       LAUREN CARRASCO 1990 [id number]                     Emergency Contacts        (Rel.) Home Phone Work Phone Mobile Phone    Cal Carrasco (Spouse) -- -- [phone]                 Discharge Summary        Magaly Brown CNM at 23 0954       Attestation signed by Bri Jimenez MD at 23 1154    I have reviewed this documentation and agree.                  Discharge Summary     Reece Gaspar " Luna  : 1990  MRN: [id number]  CSN: [id number]    Date of Admission: 2023   Date of Discharge:  2023   Delivering Physician: Bri Jimenez MD       Admission Diagnosis: S/P  section [Z98.891]   Discharge Diagnosis: Same as above plus  Pregnancy at 38w0d - delivered       Procedures: Repeat  (LTCS)     Hospital Course  See the completed operative report for details regarding antepartum course and delivery.    Her post-operative course was unremarkable.  On POD # 2 she felt like she was ready for D/C.  She was evaluated and it was determined she was able to be discharged to home.  She had no febrile morbidity. She had normal bowel and bladder function and was hemodynamically stable.  Her wound was healing well without obvious signs of infections. She is breast feeding  She is planning condoms for birth control.    Infant  male  fetus weighing 2603 g (5 lb 11.8 oz)   Apgars -  2 @ 1 minute /  4 @ 5 minutes.    Discharge labs  Lab Results   Component Value Date    WBC 15.63 (H) 2023    HGB 9.5 (L) 2023    HCT 28.2 (L) 2023     2023       Discharge Medications     Discharge Medications        New Medications        Instructions Start Date   acetaminophen 500 MG tablet  Commonly known as: TYLENOL   1,000 mg, Oral, Every 8 Hours PRN      ibuprofen 800 MG tablet  Commonly known as: ADVIL,MOTRIN   800 mg, Oral, Every 8 Hours PRN      oxyCODONE 5 MG immediate release tablet  Commonly known as: ROXICODONE   5 mg, Oral, Every 6 Hours PRN             Continue These Medications        Instructions Start Date   ferrous sulfate 325 (65 FE) MG tablet   325 mg, Oral, 2 Times Daily Before Meals      prenatal (CLASSIC) vitamin 28-0.8 MG tablet tablet  Generic drug: prenatal vitamin   Oral, Daily      Prenatal Vitamin 27-0.8 MG tablet   1 tablet, Oral, Daily             Stop These Medications      loperamide 2 MG capsule  Commonly known as: IMODIUM     ondansetron  ODT 4 MG disintegrating tablet  Commonly known as: ZOFRAN-ODT     pseudoephedrine 120 MG 12 hr tablet  Commonly known as: Sudafed 12 Hour     Unisom SleepTabs 25 MG tablet  Generic drug: doxylamine     vitamin B-6 25 MG tablet  Commonly known as: PYRIDOXINE              Discharge Disposition Home or Self Care   Condition on Discharge: good   Follow-up: 1 week with [facility].     Time spent on discharge: 30 minutes or less  Magaly Brown, ADAM  11/9/2023      Electronically signed by Bri Jimenez MD at 11/09/23 2495

## 2023-11-13 ENCOUNTER — OFFICE VISIT (OUTPATIENT)
Dept: OBSTETRICS AND GYNECOLOGY | Facility: CLINIC | Age: 33
End: 2023-11-13
Payer: MEDICAID

## 2023-11-13 VITALS
HEIGHT: 65 IN | DIASTOLIC BLOOD PRESSURE: 76 MMHG | SYSTOLIC BLOOD PRESSURE: 138 MMHG | WEIGHT: 267 LBS | BODY MASS INDEX: 44.48 KG/M2

## 2023-11-13 DIAGNOSIS — Z09 POSTOP CHECK: Primary | ICD-10-CM

## 2023-11-13 PROCEDURE — 99024 POSTOP FOLLOW-UP VISIT: CPT | Performed by: MIDWIFE

## 2023-11-13 NOTE — PROGRESS NOTES
"    Postpartum Progress Note    Patient name: Lauren Carrasco  Date of Service: 2023    ID: 33 y.o.     Diagnosis:   Chief Complaint   Patient presents with    Post-op Follow-up     No Complaints/concerns       1 week post op  section  Patient Active Problem List   Diagnosis    Hx of  section    Previous  section    Pregnancy    S/P  section       Subjective:      No complaints, Lochia light .  Ambulating, B&B habits wnl, tolerating regular diet  Pain well controlled. She is taking Tylenol and Ibuprofen as needed  The patient is currently breastfeeding.       Objective:      Vital signs:  /76   Ht 165.1 cm (65\")   Wt 121 kg (267 lb)   Breastfeeding Yes   BMI 44.43 kg/m²    General: Alert & oriented x4, in no apparent distress  Abdomen: soft, nontender  Uterus: firm, nontender  Incision: healing well  Extremities: nontender; no edema      Labs:  Lab Results   Component Value Date    WBC 15.63 (H) 2023    HGB 9.5 (L) 2023    HCT 28.2 (L) 2023    MCV 84.9 2023     2023     Results from last 7 days   Lab Units 23  1427   ABO TYPING  O   RH TYPING  Positive         Assessment/Plan:        1. S/p  delivery: Doing well.  2. Infant feeding: Supportive care.  The patient is currently breastfeeding and supplementing with formula.  3. Contraception: undecided  4. RTO 5 weeks     No orders of the defined types were placed in this encounter.      Magaly Brown CNM  2023    "

## 2023-12-01 ENCOUNTER — TELEPHONE (OUTPATIENT)
Dept: OBSTETRICS AND GYNECOLOGY | Facility: CLINIC | Age: 33
End: 2023-12-01

## 2023-12-01 NOTE — TELEPHONE ENCOUNTER
I contacted patient and advised what she has explained is her body still healing. I also advised her to take tylenol and ibuprofen as needed, as well as a heating pad. If pains or bleeding worsen to go to the ED.

## 2023-12-01 NOTE — TELEPHONE ENCOUNTER
Caller: Lauren Carrasco    Relationship to patient: Self    Best call back number: [phone]    Patient is needing: PT RECENTLY HAD A  AND STATES THE LAST 2-3 DAYS PT STATES SHE IS VERY SORE IN HER LOWER ABDOMEN AND LOWER BACK, PT IS WANTING TO SPEAK TO A NURSE TO DETERMINE IF THIS IS NORMAL, PLEASE ADVISE PT.

## 2023-12-02 ENCOUNTER — ANESTHESIA (OUTPATIENT)
Dept: PERIOP | Facility: HOSPITAL | Age: 33
End: 2023-12-02
Payer: MEDICAID

## 2023-12-02 ENCOUNTER — APPOINTMENT (OUTPATIENT)
Dept: CT IMAGING | Facility: HOSPITAL | Age: 33
DRG: 769 | End: 2023-12-02
Payer: MEDICAID

## 2023-12-02 ENCOUNTER — HOSPITAL ENCOUNTER (INPATIENT)
Facility: HOSPITAL | Age: 33
LOS: 3 days | Discharge: HOME | DRG: 769 | End: 2023-12-06
Attending: EMERGENCY MEDICINE | Admitting: INTERNAL MEDICINE
Payer: MEDICAID

## 2023-12-02 ENCOUNTER — ANESTHESIA EVENT (OUTPATIENT)
Dept: PERIOP | Facility: HOSPITAL | Age: 33
End: 2023-12-02
Payer: MEDICAID

## 2023-12-02 ENCOUNTER — ANESTHESIA EVENT CONVERTED (OUTPATIENT)
Dept: ANESTHESIOLOGY | Facility: HOSPITAL | Age: 33
DRG: 769 | End: 2023-12-02
Payer: MEDICAID

## 2023-12-02 DIAGNOSIS — R10.31 RIGHT LOWER QUADRANT ABDOMINAL PAIN: Primary | ICD-10-CM

## 2023-12-02 DIAGNOSIS — K37 APPENDICITIS: ICD-10-CM

## 2023-12-02 LAB
ALBUMIN SERPL-MCNC: 4 G/DL (ref 3.5–5.2)
ALBUMIN/GLOB SERPL: 1.3 G/DL
ALP SERPL-CCNC: 104 U/L (ref 39–117)
ALT SERPL W P-5'-P-CCNC: 23 U/L (ref 1–33)
ANION GAP SERPL CALCULATED.3IONS-SCNC: 11.2 MMOL/L (ref 5–15)
AST SERPL-CCNC: 16 U/L (ref 1–32)
BACTERIA UR QL AUTO: ABNORMAL /HPF
BASOPHILS # BLD AUTO: 0.03 10*3/MM3 (ref 0–0.2)
BASOPHILS NFR BLD AUTO: 0.3 % (ref 0–1.5)
BILIRUB SERPL-MCNC: 0.4 MG/DL (ref 0–1.2)
BILIRUB UR QL STRIP: NEGATIVE
BUN SERPL-MCNC: 14 MG/DL (ref 6–20)
BUN/CREAT SERPL: 16.1 (ref 7–25)
CALCIUM SPEC-SCNC: 9.3 MG/DL (ref 8.6–10.5)
CHLORIDE SERPL-SCNC: 105 MMOL/L (ref 98–107)
CLARITY UR: CLEAR
CO2 SERPL-SCNC: 24.8 MMOL/L (ref 22–29)
COLOR UR: YELLOW
CREAT SERPL-MCNC: 0.87 MG/DL (ref 0.57–1)
DEPRECATED RDW RBC AUTO: 42.1 FL (ref 37–54)
EGFRCR SERPLBLD CKD-EPI 2021: 90.3 ML/MIN/1.73
EOSINOPHIL # BLD AUTO: 0.08 10*3/MM3 (ref 0–0.4)
EOSINOPHIL NFR BLD AUTO: 0.9 % (ref 0.3–6.2)
ERYTHROCYTE [DISTWIDTH] IN BLOOD BY AUTOMATED COUNT: 13.6 % (ref 12.3–15.4)
FLUAV RNA RESP QL NAA+PROBE: NOT DETECTED
FLUBV RNA RESP QL NAA+PROBE: NOT DETECTED
GLOBULIN UR ELPH-MCNC: 3 GM/DL
GLUCOSE SERPL-MCNC: 98 MG/DL (ref 65–99)
GLUCOSE UR STRIP-MCNC: NEGATIVE MG/DL
HCT VFR BLD AUTO: 31.3 % (ref 34–46.6)
HGB BLD-MCNC: 10 G/DL (ref 12–15.9)
HGB UR QL STRIP.AUTO: ABNORMAL
HOLD SPECIMEN: NORMAL
HOLD SPECIMEN: NORMAL
HYALINE CASTS UR QL AUTO: ABNORMAL /LPF
IMM GRANULOCYTES # BLD AUTO: 0.07 10*3/MM3 (ref 0–0.05)
IMM GRANULOCYTES NFR BLD AUTO: 0.8 % (ref 0–0.5)
KETONES UR QL STRIP: NEGATIVE
LEUKOCYTE ESTERASE UR QL STRIP.AUTO: ABNORMAL
LIPASE SERPL-CCNC: 13 U/L (ref 13–60)
LYMPHOCYTES # BLD AUTO: 1.33 10*3/MM3 (ref 0.7–3.1)
LYMPHOCYTES NFR BLD AUTO: 15 % (ref 19.6–45.3)
MCH RBC QN AUTO: 27 PG (ref 26.6–33)
MCHC RBC AUTO-ENTMCNC: 31.9 G/DL (ref 31.5–35.7)
MCV RBC AUTO: 84.4 FL (ref 79–97)
MONOCYTES # BLD AUTO: 0.68 10*3/MM3 (ref 0.1–0.9)
MONOCYTES NFR BLD AUTO: 7.6 % (ref 5–12)
NEUTROPHILS NFR BLD AUTO: 6.7 10*3/MM3 (ref 1.7–7)
NEUTROPHILS NFR BLD AUTO: 75.4 % (ref 42.7–76)
NITRITE UR QL STRIP: NEGATIVE
NRBC BLD AUTO-RTO: 0 /100 WBC (ref 0–0.2)
PH UR STRIP.AUTO: 5.5 [PH] (ref 5–8)
PLATELET # BLD AUTO: 259 10*3/MM3 (ref 140–450)
PMV BLD AUTO: 9.8 FL (ref 6–12)
POTASSIUM SERPL-SCNC: 3.8 MMOL/L (ref 3.5–5.2)
PROT SERPL-MCNC: 7 G/DL (ref 6–8.5)
PROT UR QL STRIP: NEGATIVE
RBC # BLD AUTO: 3.71 10*6/MM3 (ref 3.77–5.28)
RBC # UR STRIP: ABNORMAL /HPF
REF LAB TEST METHOD: ABNORMAL
SARS-COV-2 RNA RESP QL NAA+PROBE: NOT DETECTED
SODIUM SERPL-SCNC: 141 MMOL/L (ref 136–145)
SP GR UR STRIP: 1.01 (ref 1–1.03)
SQUAMOUS #/AREA URNS HPF: ABNORMAL /HPF
TROPONIN T SERPL HS-MCNC: <6 NG/L
UROBILINOGEN UR QL STRIP: ABNORMAL
WBC # UR STRIP: ABNORMAL /HPF
WBC NRBC COR # BLD AUTO: 8.89 10*3/MM3 (ref 3.4–10.8)
WHOLE BLOOD HOLD COAG: NORMAL
WHOLE BLOOD HOLD SPECIMEN: NORMAL

## 2023-12-02 PROCEDURE — 25010000002 MIDAZOLAM PER 1MG: Performed by: NURSE ANESTHETIST, CERTIFIED REGISTERED

## 2023-12-02 PROCEDURE — 25010000002 PROPOFOL 200 MG/20ML EMULSION: Performed by: NURSE ANESTHETIST, CERTIFIED REGISTERED

## 2023-12-02 PROCEDURE — 25810000003 SODIUM CHLORIDE 0.9 % SOLUTION: Performed by: FAMILY MEDICINE

## 2023-12-02 PROCEDURE — 25010000002 HYDROMORPHONE 1 MG/ML SOLUTION: Performed by: NURSE ANESTHETIST, CERTIFIED REGISTERED

## 2023-12-02 PROCEDURE — 25010000002 MORPHINE PER 10 MG: Performed by: STUDENT IN AN ORGANIZED HEALTH CARE EDUCATION/TRAINING PROGRAM

## 2023-12-02 PROCEDURE — 25010000002 ONDANSETRON PER 1 MG: Performed by: STUDENT IN AN ORGANIZED HEALTH CARE EDUCATION/TRAINING PROGRAM

## 2023-12-02 PROCEDURE — 85025 COMPLETE CBC W/AUTO DIFF WBC: CPT

## 2023-12-02 PROCEDURE — 81001 URINALYSIS AUTO W/SCOPE: CPT | Performed by: EMERGENCY MEDICINE

## 2023-12-02 PROCEDURE — 25010000002 BUPIVACAINE (PF) 0.25 % SOLUTION: Performed by: STUDENT IN AN ORGANIZED HEALTH CARE EDUCATION/TRAINING PROGRAM

## 2023-12-02 PROCEDURE — 74177 CT ABD & PELVIS W/CONTRAST: CPT

## 2023-12-02 PROCEDURE — 0DTH0ZZ RESECTION OF CECUM, OPEN APPROACH: ICD-10-PCS | Performed by: STUDENT IN AN ORGANIZED HEALTH CARE EDUCATION/TRAINING PROGRAM

## 2023-12-02 PROCEDURE — 36415 COLL VENOUS BLD VENIPUNCTURE: CPT

## 2023-12-02 PROCEDURE — G0378 HOSPITAL OBSERVATION PER HR: HCPCS

## 2023-12-02 PROCEDURE — 25010000002 HYDROMORPHONE PER 4 MG: Performed by: NURSE ANESTHETIST, CERTIFIED REGISTERED

## 2023-12-02 PROCEDURE — 25510000001 IOPAMIDOL 61 % SOLUTION: Performed by: EMERGENCY MEDICINE

## 2023-12-02 PROCEDURE — 99285 EMERGENCY DEPT VISIT HI MDM: CPT

## 2023-12-02 PROCEDURE — 25810000003 LACTATED RINGERS PER 1000 ML: Performed by: STUDENT IN AN ORGANIZED HEALTH CARE EDUCATION/TRAINING PROGRAM

## 2023-12-02 PROCEDURE — 87076 CULTURE ANAEROBE IDENT EACH: CPT | Performed by: EMERGENCY MEDICINE

## 2023-12-02 PROCEDURE — 87636 SARSCOV2 & INF A&B AMP PRB: CPT | Performed by: EMERGENCY MEDICINE

## 2023-12-02 PROCEDURE — 25010000002 CEFAZOLIN SODIUM-DEXTROSE 2-3 GM-%(50ML) RECONSTITUTED SOLUTION: Performed by: NURSE ANESTHETIST, CERTIFIED REGISTERED

## 2023-12-02 PROCEDURE — 93005 ELECTROCARDIOGRAM TRACING: CPT | Performed by: EMERGENCY MEDICINE

## 2023-12-02 PROCEDURE — 99222 1ST HOSP IP/OBS MODERATE 55: CPT | Performed by: FAMILY MEDICINE

## 2023-12-02 PROCEDURE — 87070 CULTURE OTHR SPECIMN AEROBIC: CPT | Performed by: EMERGENCY MEDICINE

## 2023-12-02 PROCEDURE — 44120 REMOVAL OF SMALL INTESTINE: CPT | Performed by: STUDENT IN AN ORGANIZED HEALTH CARE EDUCATION/TRAINING PROGRAM

## 2023-12-02 PROCEDURE — 0DN84ZZ RELEASE SMALL INTESTINE, PERCUTANEOUS ENDOSCOPIC APPROACH: ICD-10-PCS | Performed by: STUDENT IN AN ORGANIZED HEALTH CARE EDUCATION/TRAINING PROGRAM

## 2023-12-02 PROCEDURE — 25010000002 FENTANYL CITRATE PF 50 MCG/ML SOLUTION PREFILLED SYRINGE: Performed by: NURSE ANESTHETIST, CERTIFIED REGISTERED

## 2023-12-02 PROCEDURE — 83690 ASSAY OF LIPASE: CPT | Performed by: EMERGENCY MEDICINE

## 2023-12-02 PROCEDURE — 80053 COMPREHEN METABOLIC PANEL: CPT | Performed by: EMERGENCY MEDICINE

## 2023-12-02 PROCEDURE — 88307 TISSUE EXAM BY PATHOLOGIST: CPT

## 2023-12-02 PROCEDURE — 84484 ASSAY OF TROPONIN QUANT: CPT | Performed by: EMERGENCY MEDICINE

## 2023-12-02 PROCEDURE — 87205 SMEAR GRAM STAIN: CPT | Performed by: EMERGENCY MEDICINE

## 2023-12-02 PROCEDURE — 25010000002 KETOROLAC TROMETHAMINE PER 15 MG: Performed by: EMERGENCY MEDICINE

## 2023-12-02 PROCEDURE — 25010000002 ONDANSETRON PER 1 MG: Performed by: NURSE ANESTHETIST, CERTIFIED REGISTERED

## 2023-12-02 PROCEDURE — 25010000002 DEXAMETHASONE PER 1 MG: Performed by: NURSE ANESTHETIST, CERTIFIED REGISTERED

## 2023-12-02 PROCEDURE — 25810000003 LACTATED RINGERS PER 1000 ML: Performed by: NURSE ANESTHETIST, CERTIFIED REGISTERED

## 2023-12-02 PROCEDURE — 93005 ELECTROCARDIOGRAM TRACING: CPT

## 2023-12-02 PROCEDURE — 44120 REMOVAL OF SMALL INTESTINE: CPT | Performed by: SURGERY

## 2023-12-02 PROCEDURE — 25010000002 PROCHLORPERAZINE 10 MG/2ML SOLUTION: Performed by: INTERNAL MEDICINE

## 2023-12-02 PROCEDURE — 25010000002 CEFAZOLIN SODIUM-DEXTROSE 2-3 GM-%(50ML) RECONSTITUTED SOLUTION: Performed by: STUDENT IN AN ORGANIZED HEALTH CARE EDUCATION/TRAINING PROGRAM

## 2023-12-02 PROCEDURE — 25010000002 ROPIVACAINE PER 1 MG: Performed by: NURSE ANESTHETIST, CERTIFIED REGISTERED

## 2023-12-02 PROCEDURE — 25010000002 SUGAMMADEX 200 MG/2ML SOLUTION: Performed by: NURSE ANESTHETIST, CERTIFIED REGISTERED

## 2023-12-02 PROCEDURE — 99254 IP/OBS CNSLTJ NEW/EST MOD 60: CPT | Performed by: STUDENT IN AN ORGANIZED HEALTH CARE EDUCATION/TRAINING PROGRAM

## 2023-12-02 DEVICE — PROXIMATE RELOADABLE LINEAR CUTTER WITH SAFETY LOCK-OUT, 75MM
Type: IMPLANTABLE DEVICE | Site: ABDOMEN | Status: FUNCTIONAL
Brand: PROXIMATE

## 2023-12-02 DEVICE — PROXIMATE LINEAR CUTTER RELOAD, BLUE, 75MM
Type: IMPLANTABLE DEVICE | Site: ABDOMEN | Status: FUNCTIONAL
Brand: PROXIMATE

## 2023-12-02 RX ORDER — NALOXONE HCL 0.4 MG/ML
0.4 VIAL (ML) INJECTION
Status: DISCONTINUED | OUTPATIENT
Start: 2023-12-02 | End: 2023-12-03

## 2023-12-02 RX ORDER — AMOXICILLIN 250 MG
2 CAPSULE ORAL 2 TIMES DAILY
Status: DISCONTINUED | OUTPATIENT
Start: 2023-12-02 | End: 2023-12-06 | Stop reason: HOSPADM

## 2023-12-02 RX ORDER — DEXAMETHASONE SODIUM PHOSPHATE 4 MG/ML
INJECTION, SOLUTION INTRA-ARTICULAR; INTRALESIONAL; INTRAMUSCULAR; INTRAVENOUS; SOFT TISSUE AS NEEDED
Status: DISCONTINUED | OUTPATIENT
Start: 2023-12-02 | End: 2023-12-02 | Stop reason: SURG

## 2023-12-02 RX ORDER — CEFOXITIN SODIUM 2 G/50ML
2000 INJECTION, SOLUTION INTRAVENOUS ONCE
Status: DISCONTINUED | OUTPATIENT
Start: 2023-12-02 | End: 2023-12-02 | Stop reason: HOSPADM

## 2023-12-02 RX ORDER — LIDOCAINE HCL/PF 100 MG/5ML
SYRINGE (ML) INJECTION AS NEEDED
Status: DISCONTINUED | OUTPATIENT
Start: 2023-12-02 | End: 2023-12-02 | Stop reason: SURG

## 2023-12-02 RX ORDER — SODIUM CHLORIDE 0.9 % (FLUSH) 0.9 %
10 SYRINGE (ML) INJECTION EVERY 12 HOURS SCHEDULED
Status: DISCONTINUED | OUTPATIENT
Start: 2023-12-02 | End: 2023-12-02 | Stop reason: HOSPADM

## 2023-12-02 RX ORDER — SUCCINYLCHOLINE/SOD CL,ISO/PF 200MG/10ML
SYRINGE (ML) INTRAVENOUS AS NEEDED
Status: DISCONTINUED | OUTPATIENT
Start: 2023-12-02 | End: 2023-12-02 | Stop reason: SURG

## 2023-12-02 RX ORDER — LIDOCAINE HYDROCHLORIDE 10 MG/ML
INJECTION, SOLUTION EPIDURAL; INFILTRATION; INTRACAUDAL; PERINEURAL AS NEEDED
Status: DISCONTINUED | OUTPATIENT
Start: 2023-12-02 | End: 2023-12-02 | Stop reason: HOSPADM

## 2023-12-02 RX ORDER — CEFAZOLIN SODIUM 2 G/50ML
2000 SOLUTION INTRAVENOUS EVERY 8 HOURS
Status: DISCONTINUED | OUTPATIENT
Start: 2023-12-02 | End: 2023-12-03

## 2023-12-02 RX ORDER — METRONIDAZOLE 500 MG/1
500 TABLET ORAL EVERY 8 HOURS SCHEDULED
Status: DISCONTINUED | OUTPATIENT
Start: 2023-12-02 | End: 2023-12-03

## 2023-12-02 RX ORDER — SODIUM CHLORIDE 9 MG/ML
100 INJECTION, SOLUTION INTRAVENOUS CONTINUOUS
Status: DISCONTINUED | OUTPATIENT
Start: 2023-12-02 | End: 2023-12-02

## 2023-12-02 RX ORDER — BISACODYL 10 MG
10 SUPPOSITORY, RECTAL RECTAL DAILY PRN
Status: DISCONTINUED | OUTPATIENT
Start: 2023-12-02 | End: 2023-12-06 | Stop reason: HOSPADM

## 2023-12-02 RX ORDER — SODIUM CHLORIDE, SODIUM LACTATE, POTASSIUM CHLORIDE, CALCIUM CHLORIDE 600; 310; 30; 20 MG/100ML; MG/100ML; MG/100ML; MG/100ML
50 INJECTION, SOLUTION INTRAVENOUS CONTINUOUS
Status: DISCONTINUED | OUTPATIENT
Start: 2023-12-02 | End: 2023-12-02

## 2023-12-02 RX ORDER — ACETAMINOPHEN 325 MG/1
650 TABLET ORAL EVERY 4 HOURS PRN
Status: DISCONTINUED | OUTPATIENT
Start: 2023-12-02 | End: 2023-12-03

## 2023-12-02 RX ORDER — ONDANSETRON 2 MG/ML
INJECTION INTRAMUSCULAR; INTRAVENOUS AS NEEDED
Status: DISCONTINUED | OUTPATIENT
Start: 2023-12-02 | End: 2023-12-02 | Stop reason: SURG

## 2023-12-02 RX ORDER — SODIUM CHLORIDE 9 MG/ML
40 INJECTION, SOLUTION INTRAVENOUS AS NEEDED
Status: DISCONTINUED | OUTPATIENT
Start: 2023-12-02 | End: 2023-12-02 | Stop reason: HOSPADM

## 2023-12-02 RX ORDER — ROPIVACAINE HYDROCHLORIDE 5 MG/ML
INJECTION, SOLUTION EPIDURAL; INFILTRATION; PERINEURAL
Status: COMPLETED | OUTPATIENT
Start: 2023-12-02 | End: 2023-12-02

## 2023-12-02 RX ORDER — SODIUM CHLORIDE 9 MG/ML
40 INJECTION, SOLUTION INTRAVENOUS AS NEEDED
Status: DISCONTINUED | OUTPATIENT
Start: 2023-12-02 | End: 2023-12-02

## 2023-12-02 RX ORDER — ACETAMINOPHEN 650 MG/1
650 SUPPOSITORY RECTAL EVERY 4 HOURS PRN
Status: DISCONTINUED | OUTPATIENT
Start: 2023-12-02 | End: 2023-12-03

## 2023-12-02 RX ORDER — HYDROMORPHONE HYDROCHLORIDE 2 MG/ML
INJECTION, SOLUTION INTRAMUSCULAR; INTRAVENOUS; SUBCUTANEOUS AS NEEDED
Status: DISCONTINUED | OUTPATIENT
Start: 2023-12-02 | End: 2023-12-02 | Stop reason: SURG

## 2023-12-02 RX ORDER — MORPHINE SULFATE 2 MG/ML
2 INJECTION, SOLUTION INTRAMUSCULAR; INTRAVENOUS EVERY 4 HOURS PRN
Status: DISCONTINUED | OUTPATIENT
Start: 2023-12-02 | End: 2023-12-03

## 2023-12-02 RX ORDER — BISACODYL 5 MG/1
5 TABLET, DELAYED RELEASE ORAL DAILY PRN
Status: DISCONTINUED | OUTPATIENT
Start: 2023-12-02 | End: 2023-12-06 | Stop reason: HOSPADM

## 2023-12-02 RX ORDER — PROCHLORPERAZINE MALEATE 10 MG
5 TABLET ORAL EVERY 6 HOURS PRN
Status: DISCONTINUED | OUTPATIENT
Start: 2023-12-02 | End: 2023-12-06 | Stop reason: HOSPADM

## 2023-12-02 RX ORDER — PROCHLORPERAZINE EDISYLATE 5 MG/ML
5 INJECTION INTRAMUSCULAR; INTRAVENOUS EVERY 6 HOURS PRN
Status: DISCONTINUED | OUTPATIENT
Start: 2023-12-02 | End: 2023-12-06 | Stop reason: HOSPADM

## 2023-12-02 RX ORDER — SODIUM CHLORIDE 0.9 % (FLUSH) 0.9 %
10 SYRINGE (ML) INJECTION EVERY 12 HOURS SCHEDULED
Status: DISCONTINUED | OUTPATIENT
Start: 2023-12-02 | End: 2023-12-02

## 2023-12-02 RX ORDER — MAGNESIUM HYDROXIDE 1200 MG/15ML
LIQUID ORAL AS NEEDED
Status: DISCONTINUED | OUTPATIENT
Start: 2023-12-02 | End: 2023-12-02 | Stop reason: HOSPADM

## 2023-12-02 RX ORDER — ROCURONIUM BROMIDE 50 MG/5 ML
SYRINGE (ML) INTRAVENOUS AS NEEDED
Status: DISCONTINUED | OUTPATIENT
Start: 2023-12-02 | End: 2023-12-02 | Stop reason: SURG

## 2023-12-02 RX ORDER — KETAMINE HCL IN NACL, ISO-OSM 100MG/10ML
SYRINGE (ML) INJECTION AS NEEDED
Status: DISCONTINUED | OUTPATIENT
Start: 2023-12-02 | End: 2023-12-02 | Stop reason: SURG

## 2023-12-02 RX ORDER — FENTANYL CITRATE 50 UG/ML
INJECTION, SOLUTION INTRAMUSCULAR; INTRAVENOUS AS NEEDED
Status: DISCONTINUED | OUTPATIENT
Start: 2023-12-02 | End: 2023-12-02 | Stop reason: SURG

## 2023-12-02 RX ORDER — SODIUM CHLORIDE 0.9 % (FLUSH) 0.9 %
10 SYRINGE (ML) INJECTION AS NEEDED
Status: DISCONTINUED | OUTPATIENT
Start: 2023-12-02 | End: 2023-12-02 | Stop reason: HOSPADM

## 2023-12-02 RX ORDER — PROPOFOL 10 MG/ML
INJECTION, EMULSION INTRAVENOUS AS NEEDED
Status: DISCONTINUED | OUTPATIENT
Start: 2023-12-02 | End: 2023-12-02 | Stop reason: SURG

## 2023-12-02 RX ORDER — BUPIVACAINE HYDROCHLORIDE 2.5 MG/ML
INJECTION, SOLUTION EPIDURAL; INFILTRATION; INTRACAUDAL AS NEEDED
Status: DISCONTINUED | OUTPATIENT
Start: 2023-12-02 | End: 2023-12-02 | Stop reason: HOSPADM

## 2023-12-02 RX ORDER — KETOROLAC TROMETHAMINE 30 MG/ML
30 INJECTION, SOLUTION INTRAMUSCULAR; INTRAVENOUS ONCE
Status: COMPLETED | OUTPATIENT
Start: 2023-12-02 | End: 2023-12-02

## 2023-12-02 RX ORDER — POLYETHYLENE GLYCOL 3350 17 G/17G
17 POWDER, FOR SOLUTION ORAL DAILY PRN
Status: DISCONTINUED | OUTPATIENT
Start: 2023-12-02 | End: 2023-12-06 | Stop reason: HOSPADM

## 2023-12-02 RX ORDER — ACETAMINOPHEN 160 MG/5ML
650 SOLUTION ORAL EVERY 4 HOURS PRN
Status: DISCONTINUED | OUTPATIENT
Start: 2023-12-02 | End: 2023-12-03

## 2023-12-02 RX ORDER — ONDANSETRON 2 MG/ML
4 INJECTION INTRAMUSCULAR; INTRAVENOUS ONCE AS NEEDED
Status: DISCONTINUED | OUTPATIENT
Start: 2023-12-02 | End: 2023-12-02 | Stop reason: HOSPADM

## 2023-12-02 RX ORDER — CHOLECALCIFEROL (VITAMIN D3) 125 MCG
5 CAPSULE ORAL NIGHTLY PRN
Status: DISCONTINUED | OUTPATIENT
Start: 2023-12-02 | End: 2023-12-06 | Stop reason: HOSPADM

## 2023-12-02 RX ORDER — SODIUM CHLORIDE 0.9 % (FLUSH) 0.9 %
10 SYRINGE (ML) INJECTION AS NEEDED
Status: DISCONTINUED | OUTPATIENT
Start: 2023-12-02 | End: 2023-12-02

## 2023-12-02 RX ORDER — CEFAZOLIN SODIUM 2 G/50ML
SOLUTION INTRAVENOUS AS NEEDED
Status: DISCONTINUED | OUTPATIENT
Start: 2023-12-02 | End: 2023-12-02 | Stop reason: SURG

## 2023-12-02 RX ORDER — SODIUM CHLORIDE, SODIUM LACTATE, POTASSIUM CHLORIDE, CALCIUM CHLORIDE 600; 310; 30; 20 MG/100ML; MG/100ML; MG/100ML; MG/100ML
100 INJECTION, SOLUTION INTRAVENOUS CONTINUOUS
Status: DISCONTINUED | OUTPATIENT
Start: 2023-12-02 | End: 2023-12-06 | Stop reason: HOSPADM

## 2023-12-02 RX ORDER — SODIUM CHLORIDE, SODIUM LACTATE, POTASSIUM CHLORIDE, CALCIUM CHLORIDE 600; 310; 30; 20 MG/100ML; MG/100ML; MG/100ML; MG/100ML
INJECTION, SOLUTION INTRAVENOUS CONTINUOUS PRN
Status: DISCONTINUED | OUTPATIENT
Start: 2023-12-02 | End: 2023-12-02 | Stop reason: SURG

## 2023-12-02 RX ORDER — LORAZEPAM 2 MG/ML
1 INJECTION INTRAMUSCULAR
Status: DISCONTINUED | OUTPATIENT
Start: 2023-12-02 | End: 2023-12-02 | Stop reason: HOSPADM

## 2023-12-02 RX ORDER — SODIUM CHLORIDE 0.9 % (FLUSH) 0.9 %
10 SYRINGE (ML) INJECTION AS NEEDED
Status: DISCONTINUED | OUTPATIENT
Start: 2023-12-02 | End: 2023-12-06 | Stop reason: HOSPADM

## 2023-12-02 RX ORDER — ONDANSETRON 2 MG/ML
4 INJECTION INTRAMUSCULAR; INTRAVENOUS EVERY 6 HOURS PRN
Status: DISCONTINUED | OUTPATIENT
Start: 2023-12-02 | End: 2023-12-06 | Stop reason: HOSPADM

## 2023-12-02 RX ORDER — PROCHLORPERAZINE 25 MG
25 SUPPOSITORY, RECTAL RECTAL EVERY 12 HOURS PRN
Status: DISCONTINUED | OUTPATIENT
Start: 2023-12-02 | End: 2023-12-06 | Stop reason: HOSPADM

## 2023-12-02 RX ORDER — MIDAZOLAM HYDROCHLORIDE 2 MG/2ML
INJECTION, SOLUTION INTRAMUSCULAR; INTRAVENOUS AS NEEDED
Status: DISCONTINUED | OUTPATIENT
Start: 2023-12-02 | End: 2023-12-02 | Stop reason: SURG

## 2023-12-02 RX ADMIN — Medication 120 MG: at 13:02

## 2023-12-02 RX ADMIN — Medication 30 MG: at 15:15

## 2023-12-02 RX ADMIN — CEFAZOLIN SODIUM 2 G: 2 SOLUTION INTRAVENOUS at 12:57

## 2023-12-02 RX ADMIN — ACETAMINOPHEN 650 MG: 325 TABLET, FILM COATED ORAL at 20:06

## 2023-12-02 RX ADMIN — PROPOFOL 200 MG: 10 INJECTION, EMULSION INTRAVENOUS at 13:02

## 2023-12-02 RX ADMIN — ONDANSETRON 4 MG: 2 INJECTION INTRAMUSCULAR; INTRAVENOUS at 12:57

## 2023-12-02 RX ADMIN — SODIUM CHLORIDE 100 ML/HR: 9 INJECTION, SOLUTION INTRAVENOUS at 10:01

## 2023-12-02 RX ADMIN — HYDROMORPHONE HYDROCHLORIDE 1 MG: 2 INJECTION, SOLUTION INTRAMUSCULAR; INTRAVENOUS; SUBCUTANEOUS at 15:41

## 2023-12-02 RX ADMIN — MORPHINE SULFATE 2 MG: 2 INJECTION, SOLUTION INTRAMUSCULAR; INTRAVENOUS at 18:48

## 2023-12-02 RX ADMIN — HYDROMORPHONE HYDROCHLORIDE 1 MG: 2 INJECTION, SOLUTION INTRAMUSCULAR; INTRAVENOUS; SUBCUTANEOUS at 16:10

## 2023-12-02 RX ADMIN — SODIUM CHLORIDE, POTASSIUM CHLORIDE, SODIUM LACTATE AND CALCIUM CHLORIDE: 600; 310; 30; 20 INJECTION, SOLUTION INTRAVENOUS at 16:35

## 2023-12-02 RX ADMIN — MORPHINE SULFATE 2 MG: 2 INJECTION, SOLUTION INTRAMUSCULAR; INTRAVENOUS at 22:48

## 2023-12-02 RX ADMIN — FENTANYL CITRATE 50 MCG: 50 INJECTION, SOLUTION INTRAMUSCULAR; INTRAVENOUS at 12:57

## 2023-12-02 RX ADMIN — FENTANYL CITRATE 50 MCG: 50 INJECTION, SOLUTION INTRAMUSCULAR; INTRAVENOUS at 13:12

## 2023-12-02 RX ADMIN — KETOROLAC TROMETHAMINE 30 MG: 30 INJECTION, SOLUTION INTRAMUSCULAR at 04:17

## 2023-12-02 RX ADMIN — Medication 50 MG: at 13:02

## 2023-12-02 RX ADMIN — SODIUM CHLORIDE, POTASSIUM CHLORIDE, SODIUM LACTATE AND CALCIUM CHLORIDE: 600; 310; 30; 20 INJECTION, SOLUTION INTRAVENOUS at 15:00

## 2023-12-02 RX ADMIN — HYDROMORPHONE HYDROCHLORIDE 0.5 MG: 1 INJECTION, SOLUTION INTRAMUSCULAR; INTRAVENOUS; SUBCUTANEOUS at 17:33

## 2023-12-02 RX ADMIN — MIDAZOLAM HYDROCHLORIDE 2 MG: 1 INJECTION, SOLUTION INTRAMUSCULAR; INTRAVENOUS at 12:57

## 2023-12-02 RX ADMIN — PROCHLORPERAZINE EDISYLATE 5 MG: 5 INJECTION INTRAMUSCULAR; INTRAVENOUS at 23:09

## 2023-12-02 RX ADMIN — SUGAMMADEX 200 MG: 100 INJECTION, SOLUTION INTRAVENOUS at 16:43

## 2023-12-02 RX ADMIN — Medication 20 MG: at 13:02

## 2023-12-02 RX ADMIN — Medication 5 MG: at 12:57

## 2023-12-02 RX ADMIN — IOPAMIDOL 100 ML: 612 INJECTION, SOLUTION INTRAVENOUS at 04:11

## 2023-12-02 RX ADMIN — FENTANYL CITRATE 50 MCG: 50 INJECTION, SOLUTION INTRAMUSCULAR; INTRAVENOUS at 15:16

## 2023-12-02 RX ADMIN — SODIUM CHLORIDE, POTASSIUM CHLORIDE, SODIUM LACTATE AND CALCIUM CHLORIDE 100 ML/HR: 600; 310; 30; 20 INJECTION, SOLUTION INTRAVENOUS at 18:34

## 2023-12-02 RX ADMIN — DEXAMETHASONE SODIUM PHOSPHATE 4 MG: 4 INJECTION INTRA-ARTICULAR; INTRALESIONAL; INTRAMUSCULAR; INTRAVENOUS; SOFT TISSUE at 13:09

## 2023-12-02 RX ADMIN — SODIUM CHLORIDE, POTASSIUM CHLORIDE, SODIUM LACTATE AND CALCIUM CHLORIDE: 600; 310; 30; 20 INJECTION, SOLUTION INTRAVENOUS at 12:57

## 2023-12-02 RX ADMIN — Medication 20 MG: at 14:55

## 2023-12-02 RX ADMIN — Medication 10 MG: at 13:12

## 2023-12-02 RX ADMIN — ONDANSETRON 4 MG: 2 INJECTION INTRAMUSCULAR; INTRAVENOUS at 20:07

## 2023-12-02 RX ADMIN — METRONIDAZOLE 500 MG: 500 TABLET ORAL at 22:48

## 2023-12-02 RX ADMIN — CEFAZOLIN SODIUM 2000 MG: 2 SOLUTION INTRAVENOUS at 18:40

## 2023-12-02 RX ADMIN — Medication 45 MG: at 13:12

## 2023-12-02 RX ADMIN — HYDROMORPHONE HYDROCHLORIDE 0.5 MG: 1 INJECTION, SOLUTION INTRAMUSCULAR; INTRAVENOUS; SUBCUTANEOUS at 17:48

## 2023-12-02 RX ADMIN — ROPIVACAINE HYDROCHLORIDE 30 ML: 5 INJECTION, SOLUTION EPIDURAL; INFILTRATION; PERINEURAL at 16:53

## 2023-12-02 NOTE — OP NOTE
PROCEDURE DATE:  23    SURGEON: Kelly Miranda DO    FIRST ASSIST: Jovani De La Cruz MD    PREOPERATIVE DIAGNOSIS: Concern for acute appendicitis status post remote open appendectomy     POSTOPERATIVE DIAGNOSIS: Same    PROCEDURE:   1) Diagnostic laparoscopy   2) Extensive lysis of adhesions  3) Midline laparotomy  4) Ileocecectomy with ileocolonic anastomosis     ANESTHESIA: GETA    EBL: 100 cc    SPECIMENS: Cecum and terminal ileum    INDICATIONS FOR THE PROCEDURE:  Patient is a 33 year old female 3 weeks status post  who presented to the ED with 3 days of right lower quadrant abdominal pain. Of note, the patient had a history of open appendectomy 22 years ago, however, her CT abdomen and pelvis demonstrated fat stranding and a dilated tubular structure in the right lower quadrant concerning for acute appendicitis. The decision was made to proceed with a diagnostic laparoscopy with probable further surgical intervention.     DESCRIPTION OF THE PROCEDURE:  Patient was seen and examined in her hospital room. History and physical was obtained, consent was signed, and all questions were answered. The patient was transferred to the operating room and placed in supine position. Anesthesia was present to intubate the patient and provide general anesthetic throughout the procedure. Once adequate sedation was attained, preoperative antibiotics were administered and a Gaxiola catheter was placed. The patient's abdomen was prepped and draped in the usual sterile fashion. Time out was performed.     A periumbilical incision was made just below the umbilicus and was carried through the soft tissue to the level of the fascia.  The fascia was grasped and elevated between hemostats and incised sharply with a knife.  Entry was confirmed into the peritoneum visually and there was no bowel visible in the vicinity of this incision.  A 12 mm cannula was inserted under direct visualization. The abdomen was insufflated with  carbon dioxide to a pressure of 15 mmHg and the laparoscope was introduced.  The abdomen was inspected and no injuries were noted from initial trocar placement. Significant omental and small bowel adhesions were noted in the inferior midline, right lower quadrant, and at the site of the patient's recent  incision. 5 mm ports was placed in the left lower and left upper quadrant under direct visualization to facilitate lysis of adhesions. Meticulous lysis of adhesions was carried out with harmonic scalpel and blunt dissection. Care was taken not to injure the bowel. Once adequate visualization was obtained in the right lower quadrant, an additional 5 mm port was placed in the suprapubic region slightly off midline. The superior portion of the cecum was identified as well as the terminal ileum. Further tedious dissection was carried out inferiorly on the cecum in an attempt to identify the appendix. There was significant phlegmon and scar tissue in this area. A small abscess was entered and purulent material was evacuated. The whole inferior cecum was very friable and an enterotomy after dissection was identified. There was still no evidence of a tubular structure indicating the appendix.    At this point, the decision was made to convert to a laparotomy and proceed with an ileocecectomy. It was felt that there was such significant inflammation and scar tissue in this area, that any anastomosis would be at risk for breaking down. I did call my partner, Dr. De La Cruz, at this point to assist with the surgery. A lower midline laparotomy was made and the abdominal cavity was entered. The enterotomy was controlled with suture and there was no spillage of enteric contents. The right colon was mobilized and a point just distal to the cecum was chosen for transection with a MINA stapler. Similarly, a portion of healthy appearing ileum approximately 20 cm from the ileocecal valve was selected and stapled and divided. A  side to side stapled anastomosis was performed. The anastomosis was reinforced with multiple silk sutures. The anastomosis was checked and was widely patent.     The small bowel was run from the anastomosis proximally and no additional enterotomies were identified that may have occurred during lysis of adhesions. The abdomen was irrigated copiously with sterile saline. The laparotomy was closed with 0 prolene suture in figure of eight fashion. The wound was irrigated and the skin was closed with surgical staples. The 5 mm port sites were all closed with subcuticular 4-0 vicryl. The midline incision was dressed with a Prevena wound vac system. The laparoscopic ports were dressed with surgical glue. Patient tolerated the procedure well and was transferred to PACU in stable condition.     DISPOSITION: PACU    STATUS: Stable     COMPLICATIONS: None

## 2023-12-02 NOTE — H&P
[facility]   HISTORY AND PHYSICAL      Name:  Lauren Carrasco   Age:  33 y.o.  Sex:  female  :  1990  MRN:  [id number]   Visit Number:  [id number]  Admission Date:  2023  Date Of Service:  23  Primary Care Physician:  Jaqueline Hays APRN    Chief Complaint:     Abdominal pain    History Of Presenting Illness:      Patient is a 33 years old female with a past medical history of  3.5 weeks who presented to the ER with a chief complaint of abdominal pain.  Patient reports that she was doing well after her  until 3 days ago when she started having abdominal discomfort in her right lower quadrant that radiated to her back, patient then started having fever yesterday of 100.5 F and her pain became worse so she decided to come to the ER for evaluation.  Patient denies any vomiting or urinary symptoms.  Patient denies changes in her bowel habits.  Patient has some remaining mild incisional drainage at the site of her . Patient confirmed that her appendix ruptured when she was 11 years old and had appendectomy.  No other abdominal surgeries.    On ER evaluation, her vitals were stable and was afebrile.  Her workup was pretty much within acceptable range except for hemoglobin of 10.0.  WBC WNL.  Urinalysis negative for infection.  CT abdomen pelvis showed findings consistent with acute appendicitis, Dilated appendix measuring up to 2.2 cm in diameter, with adjacent fat stranding and small fluid. Appendicolith at the appendix base. Heterogeneous fluid collection anterior to the uterus, which could be due to infected fluid or blood products. No evidence of active bleeding. Anterior abdominal wall  scar with no visible associated abscess.  Patient received Toradol.  Case discussed with Dr. Miranda with general surgery who recommended admission for observation, no indication for antibiotics at this time with plan on repeating CT  scan for evaluation. Hospitalist consulted for admission.    Review Of Systems:    All systems were reviewed and negative except as mentioned in history of presenting illness, assessment and plan.    Past Medical History: Patient  has a past medical history of Abnormal Pap smear of cervix, Hypertension, Pregnancy (2023), and Recurrent pregnancy loss, antepartum condition or complication (2021/2022).    Past Surgical History: Patient  has a past surgical history that includes Adenoidectomy;  section; and  section (N/A, 2023).    Social History: Patient  reports that she has never smoked. She has never used smokeless tobacco. She reports that she does not drink alcohol and does not use drugs.    Family History:  Patient's family history has been reviewed and found to be noncontributory.     Allergies:      Patient has no known allergies.    Home Medications:    Prior to Admission Medications       Prescriptions Last Dose Informant Patient Reported? Taking?    acetaminophen (TYLENOL) 500 MG tablet Past Month  No Yes    Take 2 tablets by mouth Every 8 (Eight) Hours As Needed for Mild Pain.    ferrous sulfate 325 (65 FE) MG tablet Past Week  No Yes    Take 1 tablet by mouth 2 (Two) Times a Day Before Meals.    ibuprofen (ADVIL,MOTRIN) 800 MG tablet Past Week  No Yes    Take 1 tablet by mouth Every 8 (Eight) Hours As Needed for Mild Pain.    Prenatal Vit-Fe Fumarate-FA (Prenatal Vitamin) 27-0.8 MG tablet Past Month  No Yes    Take 1 tablet by mouth Daily.    prenatal vitamin (prenatal, CLASSIC, vitamin) tablet Past Month  Yes Yes    Take  by mouth Daily.          ED Medications:    Medications   sodium chloride 0.9 % flush 10 mL (has no administration in time range)   ketorolac (TORADOL) injection 30 mg (30 mg Intravenous Given 23 6599)   iopamidol (ISOVUE-300) 61 % injection 100 mL (100 mL Intravenous Given 23 6540)     Vital Signs:  Temp:  [98.3 °F (36.8 °C)-98.4 °F (36.9  "°C)] 98.3 °F (36.8 °C)  Heart Rate:  [80-97] 80  Resp:  [18] 18  BP: (114-140)/(61-74) 124/67        23  0232 23  0656   Weight: 117 kg (257 lb) 115 kg (252 lb 6.8 oz)     Body mass index is 40.74 kg/m².    Physical Exam:     Most recent vital Signs: /67 (BP Location: Left arm, Patient Position: Lying)   Pulse 80   Temp 98.3 °F (36.8 °C) (Oral)   Resp 18   Ht 167.6 cm (66\")   Wt 115 kg (252 lb 6.8 oz)   LMP  (LMP Unknown)   SpO2 97%   Breastfeeding No   BMI 40.74 kg/m²     Physical Exam  Vitals and nursing note reviewed.   Constitutional:       General: She is not in acute distress.     Appearance: Normal appearance.   HENT:      Head: Normocephalic and atraumatic.      Right Ear: External ear normal.      Left Ear: External ear normal.      Nose: Nose normal.      Mouth/Throat:      Mouth: Mucous membranes are moist.   Eyes:      Extraocular Movements: Extraocular movements intact.      Conjunctiva/sclera: Conjunctivae normal.      Pupils: Pupils are equal, round, and reactive to light.   Cardiovascular:      Rate and Rhythm: Normal rate and regular rhythm.      Pulses: Normal pulses.      Heart sounds: Normal heart sounds.   Pulmonary:      Effort: Pulmonary effort is normal. No respiratory distress.      Breath sounds: Normal breath sounds. No wheezing or rhonchi.   Abdominal:      General: Bowel sounds are normal. There is no distension.      Palpations: Abdomen is soft.      Tenderness: There is abdominal tenderness in the right lower quadrant. There is no guarding or rebound.   Musculoskeletal:         General: Normal range of motion.      Cervical back: Normal range of motion and neck supple.      Right lower leg: No edema.      Left lower leg: No edema.   Skin:     General: Skin is warm and dry.      Findings: No rash.      Comments:  surgical site appears well-healed except for 2 minor openings with minimal serous drainage.  No wound dehiscence at the  incision " noted. no deb-incisional fluctuance or abscess formation.   Neurological:      General: No focal deficit present.      Mental Status: She is alert and oriented to person, place, and time. Mental status is at baseline.      Motor: No weakness.   Psychiatric:         Mood and Affect: Mood normal.         Behavior: Behavior normal.         Thought Content: Thought content normal.         Laboratory data:    I have reviewed the labs done in the emergency room.    Results from last 7 days   Lab Units 12/02/23  0308   SODIUM mmol/L 141   POTASSIUM mmol/L 3.8   CHLORIDE mmol/L 105   CO2 mmol/L 24.8   BUN mg/dL 14   CREATININE mg/dL 0.87   CALCIUM mg/dL 9.3   BILIRUBIN mg/dL 0.4   ALK PHOS U/L 104   ALT (SGPT) U/L 23   AST (SGOT) U/L 16   GLUCOSE mg/dL 98     Results from last 7 days   Lab Units 12/02/23  0308   WBC 10*3/mm3 8.89   HEMOGLOBIN g/dL 10.0*   HEMATOCRIT % 31.3*   PLATELETS 10*3/mm3 259         Results from last 7 days   Lab Units 12/02/23  0308   HSTROP T ng/L <6             Results from last 7 days   Lab Units 12/02/23  0308   LIPASE U/L 13         Results from last 7 days   Lab Units 12/02/23  0303   COLOR UA  Yellow   GLUCOSE UA  Negative   KETONES UA  Negative   BLOOD UA  Trace*   LEUKOCYTES UA  Trace*   PH, URINE  5.5   BILIRUBIN UA  Negative   UROBILINOGEN UA  0.2 E.U./dL   RBC UA /HPF None Seen   WBC UA /HPF 0-2       Pain Management Panel          Latest Ref Rng & Units 5/2/2023   Pain Management Panel   Creatinine, Urine 20.0 - 300.0 mg/dL 125.6    Amphetamine, Urine Qual Zxolii=2132 ng/mL Negative    Barbiturates Screen, Urine Ohcvcc=541 ng/mL Negative    Benzodiazepine Screen, Urine Tyxpyr=642 ng/mL Negative    Cocaine Screen, Urine Aybjgu=518 ng/mL Negative    Methadone Screen , Urine Gkhmfe=502 ng/mL Negative        EKG:      Sinus rhythm, heart rate 85, nonspecific ST/T wave changes.    Radiology:    CT Abdomen Pelvis With Contrast    Addendum Date: 12/2/2023    ADDENDUM REPORT ADDENDUM: This  report was discussed with DR. ZIYAD MONTGOMERY on Dec 02, 2023 04:52:00 EST. Authenticated and Electronically Signed by Lazaro Rich MD on 2023 04:53:13 AM    Result Date: 2023  FINAL REPORT TECHNIQUE: null CLINICAL HISTORY: 3 weeks post c section, fever, incision drainage COMPARISON: null FINDINGS: CT Abdomen and Pelvis W Contrast COMPARISON: CT - ABDOMEN/PELVIS W/O CONTRAST - 2015 04:36 PM EDT FINDINGS: Normal liver. Splenomegaly. Left renal cyst. Unremarkable right kidney. No hydronephrosis. Normal adrenal glands. Normal pancreas. No visible gallstones. No biliary dilatation. No evidence of bowel obstruction or colitis. Dilated appendix measuring up to 2.2 cm in diameter, with adjacent fat stranding and small fluid. Appendicolith at the appendix base. Unremarkable bladder.  scar in the anterior uterus. There is a small heterogeneous fluid collection anterior to the uterus (for example series 4, image 82). No contrast blush. No pneumoperitoneum. No lymphadenopathy. No acute fracture. No abdominal aortic aneurysm. Small fat containing umbilical hernia. Anterior abdominal wall  scar with no visible associated abscess.     IMPRESSION: Findings consistent with acute appendicitis. Heterogeneous fluid collection anterior to the uterus, which could be due to infected fluid or blood products. No evidence of active bleeding. Nonemergent/incidental findings in the report. Authenticated and Electronically Signed by Lazaro Rich MD on 2023 04:48:29 AM     Assessment:    Possible appendicitis, POA  Status post   Right lower quadrant abdominal pain, POA    Plan:    Patient is admitted to [facility] for further management and treatment.    Possible appendicitis  -Patient reports appendectomy when she was 11 years old  -Dr. Miranda with general surgery consulted, appreciate recommendations.  -Continue IV fluids, antiemetics and pain control PRN  -No leukocytosis, afebrile, no indication  for antibiotics at this time  -Wound cultures obtained from  site, will follow  -Plan on repeating CT scan per surgery recommendations  -N.p.o. until evaluation by surgery    Further orders as indicated per clinical course.    Risk Assessment: Moderate  DVT Prophylaxis: SCDs, ambulatory  Code Status: Full  Diet: N.p.o.    Advance Care Planning   ACP discussion was held with the patient during this visit. Patient does not have an advance directive, information provided.       Joe Webb MD  23  09:29 EST    Dictated utilizing Dragon dictation.

## 2023-12-02 NOTE — PAYOR COMM NOTE
"To:  [employer]  From: Angelika Garza RN  Phone: [phone]  Fax: [phone]  NPI: [id number]  TIN: [id number]  Member ID: [id number]   MRN: [id number]    OBSERVATION NOTIFICATION    Lauren Cody (33 y.o. Female)       Date of Birth   1990    Social Security Number       Address   [address] [place], [place] [address]    Home Phone   [phone]    MRN   [id number]       Tenriism   None    Marital Status                               Admission Date   23    Admission Type   Emergency    Admitting Provider   Joe Webb MD    Attending Provider   Joe Webb MD    Department, Room/Bed   [facility] TELEMETRY        Discharge Date       Discharge Disposition       Discharge Destination                                 Attending Provider: Joe Webb MD    Allergies: No Known Allergies    Isolation: None   Infection: COVID (rule out) (23)   Code Status: CPR    Ht: 167.6 cm (66\")   Wt: 115 kg (252 lb 6.8 oz)    Admission Cmt: None   Principal Problem: Right lower quadrant abdominal pain [R10.31]                   Active Insurance as of 2023       Primary Coverage       Payor Plan Insurance Group Employer/Plan Group    [place] [id number]       Payor Plan Address Payor Plan Phone Number Payor Plan Fax Number Effective Dates    [employer] [address] [address] [phone]  3/1/2023 - None Entered    [facility] [id number]         Subscriber Name Subscriber Birth Date Member ID       LAUREN CODY 1990 [id number]                     Emergency Contacts        (Rel.) Home Phone Work Phone Mobile Phone    Cal Cody (Spouse) -- -- [phone]                 History & Physical        Joe Webb MD at 23 0929            [facility]   HISTORY AND PHYSICAL      Name:  Lauren Cody   Age:  33 y.o.  Sex:  female  :  1990  MRN:  " [id number]   Visit Number:  [id number]  Admission Date:  2023  Date Of Service:  23  Primary Care Physician:  Jaqueline Hays APRN    Chief Complaint:     Abdominal pain    History Of Presenting Illness:      Patient is a 33 years old female with a past medical history of  3.5 weeks who presented to the ER with a chief complaint of abdominal pain.  Patient reports that she was doing well after her  until 3 days ago when she started having abdominal discomfort in her right lower quadrant that radiated to her back, patient then started having fever yesterday of 100.5 F and her pain became worse so she decided to come to the ER for evaluation.  Patient denies any vomiting or urinary symptoms.  Patient denies changes in her bowel habits.  Patient has some remaining mild incisional drainage at the site of her . Patient confirmed that her appendix ruptured when she was 11 years old and had appendectomy.  No other abdominal surgeries.    On ER evaluation, her vitals were stable and was afebrile.  Her workup was pretty much within acceptable range except for hemoglobin of 10.0.  WBC WNL.  Urinalysis negative for infection.  CT abdomen pelvis showed findings consistent with acute appendicitis, Dilated appendix measuring up to 2.2 cm in diameter, with adjacent fat stranding and small fluid. Appendicolith at the appendix base. Heterogeneous fluid collection anterior to the uterus, which could be due to infected fluid or blood products. No evidence of active bleeding. Anterior abdominal wall  scar with no visible associated abscess.  Patient received Toradol.  Case discussed with Dr. Miranda with general surgery who recommended admission for observation, no indication for antibiotics at this time with plan on repeating CT scan for evaluation. Hospitalist consulted for admission.    Review Of Systems:    All systems were reviewed and negative except as mentioned in history of  presenting illness, assessment and plan.    Past Medical History: Patient  has a past medical history of Abnormal Pap smear of cervix, Hypertension, Pregnancy (2023), and Recurrent pregnancy loss, antepartum condition or complication (2021/2022).    Past Surgical History: Patient  has a past surgical history that includes Adenoidectomy;  section; and  section (N/A, 2023).    Social History: Patient  reports that she has never smoked. She has never used smokeless tobacco. She reports that she does not drink alcohol and does not use drugs.    Family History:  Patient's family history has been reviewed and found to be noncontributory.     Allergies:      Patient has no known allergies.    Home Medications:    Prior to Admission Medications       Prescriptions Last Dose Informant Patient Reported? Taking?    acetaminophen (TYLENOL) 500 MG tablet Past Month  No Yes    Take 2 tablets by mouth Every 8 (Eight) Hours As Needed for Mild Pain.    ferrous sulfate 325 (65 FE) MG tablet Past Week  No Yes    Take 1 tablet by mouth 2 (Two) Times a Day Before Meals.    ibuprofen (ADVIL,MOTRIN) 800 MG tablet Past Week  No Yes    Take 1 tablet by mouth Every 8 (Eight) Hours As Needed for Mild Pain.    Prenatal Vit-Fe Fumarate-FA (Prenatal Vitamin) 27-0.8 MG tablet Past Month  No Yes    Take 1 tablet by mouth Daily.    prenatal vitamin (prenatal, CLASSIC, vitamin) tablet Past Month  Yes Yes    Take  by mouth Daily.          ED Medications:    Medications   sodium chloride 0.9 % flush 10 mL (has no administration in time range)   ketorolac (TORADOL) injection 30 mg (30 mg Intravenous Given 23 5096)   iopamidol (ISOVUE-300) 61 % injection 100 mL (100 mL Intravenous Given 23 6871)     Vital Signs:  Temp:  [98.3 °F (36.8 °C)-98.4 °F (36.9 °C)] 98.3 °F (36.8 °C)  Heart Rate:  [80-97] 80  Resp:  [18] 18  BP: (114-140)/(61-74) 124/67        23  0232 23  0656   Weight: 117 kg (257 lb)  "115 kg (252 lb 6.8 oz)     Body mass index is 40.74 kg/m².    Physical Exam:     Most recent vital Signs: /67 (BP Location: Left arm, Patient Position: Lying)   Pulse 80   Temp 98.3 °F (36.8 °C) (Oral)   Resp 18   Ht 167.6 cm (66\")   Wt 115 kg (252 lb 6.8 oz)   LMP  (LMP Unknown)   SpO2 97%   Breastfeeding No   BMI 40.74 kg/m²     Physical Exam  Vitals and nursing note reviewed.   Constitutional:       General: She is not in acute distress.     Appearance: Normal appearance.   HENT:      Head: Normocephalic and atraumatic.      Right Ear: External ear normal.      Left Ear: External ear normal.      Nose: Nose normal.      Mouth/Throat:      Mouth: Mucous membranes are moist.   Eyes:      Extraocular Movements: Extraocular movements intact.      Conjunctiva/sclera: Conjunctivae normal.      Pupils: Pupils are equal, round, and reactive to light.   Cardiovascular:      Rate and Rhythm: Normal rate and regular rhythm.      Pulses: Normal pulses.      Heart sounds: Normal heart sounds.   Pulmonary:      Effort: Pulmonary effort is normal. No respiratory distress.      Breath sounds: Normal breath sounds. No wheezing or rhonchi.   Abdominal:      General: Bowel sounds are normal. There is no distension.      Palpations: Abdomen is soft.      Tenderness: There is abdominal tenderness in the right lower quadrant. There is no guarding or rebound.   Musculoskeletal:         General: Normal range of motion.      Cervical back: Normal range of motion and neck supple.      Right lower leg: No edema.      Left lower leg: No edema.   Skin:     General: Skin is warm and dry.      Findings: No rash.      Comments:  surgical site appears well-healed except for 2 minor openings with minimal serous drainage.  No wound dehiscence at the  incision noted. no deb-incisional fluctuance or abscess formation.   Neurological:      General: No focal deficit present.      Mental Status: She is alert and " oriented to person, place, and time. Mental status is at baseline.      Motor: No weakness.   Psychiatric:         Mood and Affect: Mood normal.         Behavior: Behavior normal.         Thought Content: Thought content normal.         Laboratory data:    I have reviewed the labs done in the emergency room.    Results from last 7 days   Lab Units 12/02/23  0308   SODIUM mmol/L 141   POTASSIUM mmol/L 3.8   CHLORIDE mmol/L 105   CO2 mmol/L 24.8   BUN mg/dL 14   CREATININE mg/dL 0.87   CALCIUM mg/dL 9.3   BILIRUBIN mg/dL 0.4   ALK PHOS U/L 104   ALT (SGPT) U/L 23   AST (SGOT) U/L 16   GLUCOSE mg/dL 98     Results from last 7 days   Lab Units 12/02/23  0308   WBC 10*3/mm3 8.89   HEMOGLOBIN g/dL 10.0*   HEMATOCRIT % 31.3*   PLATELETS 10*3/mm3 259         Results from last 7 days   Lab Units 12/02/23  0308   HSTROP T ng/L <6             Results from last 7 days   Lab Units 12/02/23  0308   LIPASE U/L 13         Results from last 7 days   Lab Units 12/02/23  0303   COLOR UA  Yellow   GLUCOSE UA  Negative   KETONES UA  Negative   BLOOD UA  Trace*   LEUKOCYTES UA  Trace*   PH, URINE  5.5   BILIRUBIN UA  Negative   UROBILINOGEN UA  0.2 E.U./dL   RBC UA /HPF None Seen   WBC UA /HPF 0-2       Pain Management Panel          Latest Ref Rng & Units 5/2/2023   Pain Management Panel   Creatinine, Urine 20.0 - 300.0 mg/dL 125.6    Amphetamine, Urine Qual Jqdhsn=5560 ng/mL Negative    Barbiturates Screen, Urine Enbvck=750 ng/mL Negative    Benzodiazepine Screen, Urine Vrjyji=610 ng/mL Negative    Cocaine Screen, Urine Qytjck=945 ng/mL Negative    Methadone Screen , Urine Reihkx=893 ng/mL Negative        EKG:      Sinus rhythm, heart rate 85, nonspecific ST/T wave changes.    Radiology:    CT Abdomen Pelvis With Contrast    Addendum Date: 12/2/2023    ADDENDUM REPORT ADDENDUM: This report was discussed with DR. ZIYAD MONTGOMERY on Dec 02, 2023 04:52:00 EST. Authenticated and Electronically Signed by Lazaro Rich MD on 12/02/2023 04:53:13  AM    Result Date: 2023  FINAL REPORT TECHNIQUE: null CLINICAL HISTORY: 3 weeks post c section, fever, incision drainage COMPARISON: null FINDINGS: CT Abdomen and Pelvis W Contrast COMPARISON: CT - ABDOMEN/PELVIS W/O CONTRAST - 2015 04:36 PM EDT FINDINGS: Normal liver. Splenomegaly. Left renal cyst. Unremarkable right kidney. No hydronephrosis. Normal adrenal glands. Normal pancreas. No visible gallstones. No biliary dilatation. No evidence of bowel obstruction or colitis. Dilated appendix measuring up to 2.2 cm in diameter, with adjacent fat stranding and small fluid. Appendicolith at the appendix base. Unremarkable bladder.  scar in the anterior uterus. There is a small heterogeneous fluid collection anterior to the uterus (for example series 4, image 82). No contrast blush. No pneumoperitoneum. No lymphadenopathy. No acute fracture. No abdominal aortic aneurysm. Small fat containing umbilical hernia. Anterior abdominal wall  scar with no visible associated abscess.     IMPRESSION: Findings consistent with acute appendicitis. Heterogeneous fluid collection anterior to the uterus, which could be due to infected fluid or blood products. No evidence of active bleeding. Nonemergent/incidental findings in the report. Authenticated and Electronically Signed by Lazaro Rich MD on 2023 04:48:29 AM     Assessment:    Possible appendicitis, POA  Status post   Right lower quadrant abdominal pain, POA    Plan:    Patient is admitted to [facility] for further management and treatment.    Possible appendicitis  -Patient reports appendectomy when she was 11 years old  -Dr. Miranda with general surgery consulted, appreciate recommendations.  -Continue IV fluids, antiemetics and pain control PRN  -No leukocytosis, afebrile, no indication for antibiotics at this time  -Wound cultures obtained from  site, will follow  -Plan on repeating CT scan per surgery recommendations  -N.p.o.  until evaluation by surgery    Further orders as indicated per clinical course.    Risk Assessment: Moderate  DVT Prophylaxis: SCDs, ambulatory  Code Status: Full  Diet: N.p.o.    Advance Care Planning  ACP discussion was held with the patient during this visit. Patient does not have an advance directive, information provided.       Joe Webb MD  12/02/23  09:29 EST    Dictated utilizing Dragon dictation.    Electronically signed by Joe Webb MD at 12/02/23 0945       Vital Signs (last day)       Date/Time Temp Temp src Pulse Resp BP Patient Position SpO2    12/02/23 0656 98.3 (36.8) Oral 80 18 124/67 Lying 97    12/02/23 0545 -- -- 82 18 120/71 Sitting 97    12/02/23 0445 -- -- 82 -- 114/61 -- 97    12/02/23 0312 -- -- 87 -- 125/69 -- 98    12/02/23 0232 98.4 (36.9) Oral 97 18 140/74 Sitting 98          Current Facility-Administered Medications   Medication Dose Route Frequency Provider Last Rate Last Admin    acetaminophen (TYLENOL) tablet 650 mg  650 mg Oral Q4H PRN Joe Webb MD        Or    acetaminophen (TYLENOL) 160 MG/5ML oral solution 650 mg  650 mg Oral Q4H PRN Joe Webb MD        Or    acetaminophen (TYLENOL) suppository 650 mg  650 mg Rectal Q4H PRN Joe Webb MD        sennosides-docusate (PERICOLACE) 8.6-50 MG per tablet 2 tablet  2 tablet Oral BID Joe Webb MD        And    polyethylene glycol (MIRALAX) packet 17 g  17 g Oral Daily PRN Joe Webb MD        And    bisacodyl (DULCOLAX) EC tablet 5 mg  5 mg Oral Daily PRN Joe Webb MD        And    bisacodyl (DULCOLAX) suppository 10 mg  10 mg Rectal Daily PRN Joe Webb MD        Magnesium Standard Dose Replacement - Follow Nurse / BPA Driven Protocol   Does not apply PRN Joe Webb MD        melatonin tablet 5 mg  5 mg Oral Nightly PRN Joe Webb MD        Morphine sulfate (PF) injection 2 mg  2 mg Intravenous Q4H PRN Joe Webb MD        And    naloxone (NARCAN) injection 0.4  mg  0.4 mg Intravenous Q5 Min PRN Joe Webb MD        ondansetron (ZOFRAN) injection 4 mg  4 mg Intravenous Q6H PRN Joe Webb MD        Potassium Replacement - Follow Nurse / BPA Driven Protocol   Does not apply PRN Joe Webb MD        sodium chloride 0.9 % flush 10 mL  10 mL Intravenous PRN Joe Webb MD        sodium chloride 0.9 % flush 10 mL  10 mL Intravenous Q12H Joe Webb MD        sodium chloride 0.9 % flush 10 mL  10 mL Intravenous PRN Joe Webb MD        sodium chloride 0.9 % infusion 40 mL  40 mL Intravenous PRN Joe Webb MD        sodium chloride 0.9 % infusion  100 mL/hr Intravenous Continuous Joe Webb  mL/hr at 12/02/23 1001 100 mL/hr at 12/02/23 1001     Lab Results (last 24 hours)       Procedure Component Value Units Date/Time    Wound Culture - Surgical Site, Abdominal Wall [[id number]] Collected: 12/02/23 0418    Specimen: Surgical Site from Abdominal Wall Updated: 12/02/23 0512     Gram Stain Moderate (3+) WBCs seen      No No organisms seen    COVID-19 and FLU A/B PCR, 1 HR TAT - Swab, Nasopharynx [[id number]]  (Normal) Collected: 12/02/23 0417    Specimen: Swab from Nasopharynx Updated: 12/02/23 0449     COVID19 Not Detected     Influenza A PCR Not Detected     Influenza B PCR Not Detected    Narrative:      Fact sheet for providers: https://www.fda.gov/media/975256/download    Fact sheet for patients: https://www.fda.gov/media/432324/download    Test performed by PCR.    [place] Draw [[id number]] Collected: 12/02/23 0308    Specimen: Blood Updated: 12/02/23 0415    Narrative:      The following orders were created for panel order [place] Draw.  Procedure                               Abnormality         Status                     ---------                               -----------         ------                     Green Top (Gel)[[id number]]                                  Final result               Lavender Top[[id number]]                                      Final result               Gold Top - SST[[id number]]                                   Final result               Light Blue Top[[id number]]                                   Final result                 Please view results for these tests on the individual orders.    Green Top (Gel) [[id number]] Collected: 12/02/23 0308    Specimen: Blood Updated: 12/02/23 0415     Extra Tube Hold for add-ons.     Comment: Auto resulted.       Lavender Top [[id number]] Collected: 12/02/23 0308    Specimen: Blood Updated: 12/02/23 0415     Extra Tube hold for add-on     Comment: Auto resulted       Gold Top - SST [[id number]] Collected: 12/02/23 0308    Specimen: Blood Updated: 12/02/23 0415     Extra Tube Hold for add-ons.     Comment: Auto resulted.       Light Blue Top [[id number]] Collected: 12/02/23 0308    Specimen: Blood Updated: 12/02/23 0415     Extra Tube Hold for add-ons.     Comment: Auto resulted       Comprehensive Metabolic Panel [[id number]] Collected: 12/02/23 0308    Specimen: Blood Updated: 12/02/23 0407     Glucose 98 mg/dL      BUN 14 mg/dL      Creatinine 0.87 mg/dL      Sodium 141 mmol/L      Potassium 3.8 mmol/L      Chloride 105 mmol/L      CO2 24.8 mmol/L      Calcium 9.3 mg/dL      Total Protein 7.0 g/dL      Albumin 4.0 g/dL      ALT (SGPT) 23 U/L      AST (SGOT) 16 U/L      Alkaline Phosphatase 104 U/L      Total Bilirubin 0.4 mg/dL      Globulin 3.0 gm/dL      A/G Ratio 1.3 g/dL      BUN/Creatinine Ratio 16.1     Anion Gap 11.2 mmol/L      eGFR 90.3 mL/min/1.73     Narrative:      GFR Normal >60  Chronic Kidney Disease <60  Kidney Failure <15      Single High Sensitivity Troponin T [[id number]]  (Normal) Collected: 12/02/23 0308    Specimen: Blood Updated: 12/02/23 0401     HS Troponin T <6 ng/L     Narrative:      High Sensitive Troponin T Reference Range:  <14.0 ng/L- Negative Female for AMI  <22.0 ng/L- Negative Male for AMI  >=14 - Abnormal Female indicating possible myocardial  injury.  >=22 - Abnormal Male indicating possible myocardial injury.   Clinicians would have to utilize clinical acumen, EKG, Troponin, and serial changes to determine if it is an Acute Myocardial Infarction or myocardial injury due to an underlying chronic condition.         Lipase [[id number]]  (Normal) Collected: 12/02/23 0308    Specimen: Blood Updated: 12/02/23 0359     Lipase 13 U/L     Urinalysis, Microscopic Only - Urine, Clean Catch [[id number]]  (Abnormal) Collected: 12/02/23 0303    Specimen: Urine, Clean Catch Updated: 12/02/23 0352     RBC, UA None Seen /HPF      WBC, UA 0-2 /HPF      Bacteria, UA Trace /HPF      Squamous Epithelial Cells, UA 0-2 /HPF      Hyaline Casts, UA None Seen /LPF      Methodology Manual Light Microscopy    Urinalysis With Microscopic If Indicated (No Culture) - Urine, Clean Catch [[id number]]  (Abnormal) Collected: 12/02/23 0303    Specimen: Urine, Clean Catch Updated: 12/02/23 0338     Color, UA Yellow     Appearance, UA Clear     pH, UA 5.5     Specific Gravity, UA 1.015     Glucose, UA Negative     Ketones, UA Negative     Bilirubin, UA Negative     Blood, UA Trace     Protein, UA Negative     Leuk Esterase, UA Trace     Nitrite, UA Negative     Urobilinogen, UA 0.2 E.U./dL    CBC & Differential [[id number]]  (Abnormal) Collected: 12/02/23 0308    Specimen: Blood Updated: 12/02/23 0315    Narrative:      The following orders were created for panel order CBC & Differential.  Procedure                               Abnormality         Status                     ---------                               -----------         ------                     CBC Auto Differential[[id number]]        Abnormal            Final result                 Please view results for these tests on the individual orders.    CBC Auto Differential [[id number]]  (Abnormal) Collected: 12/02/23 0308    Specimen: Blood Updated: 12/02/23 0315     WBC 8.89 10*3/mm3      RBC 3.71 10*6/mm3      Hemoglobin 10.0 g/dL       Hematocrit 31.3 %      MCV 84.4 fL      MCH 27.0 pg      MCHC 31.9 g/dL      RDW 13.6 %      RDW-SD 42.1 fl      MPV 9.8 fL      Platelets 259 10*3/mm3      Neutrophil % 75.4 %      Lymphocyte % 15.0 %      Monocyte % 7.6 %      Eosinophil % 0.9 %      Basophil % 0.3 %      Immature Grans % 0.8 %      Neutrophils, Absolute 6.70 10*3/mm3      Lymphocytes, Absolute 1.33 10*3/mm3      Monocytes, Absolute 0.68 10*3/mm3      Eosinophils, Absolute 0.08 10*3/mm3      Basophils, Absolute 0.03 10*3/mm3      Immature Grans, Absolute 0.07 10*3/mm3      nRBC 0.0 /100 WBC           Imaging Results (Last 24 Hours)       Procedure Component Value Units Date/Time    CT Abdomen Pelvis With Contrast [[id number]] Collected: 23     Updated: 23    Addenda:        ADDENDUM REPORT    ADDENDUM:  This report was discussed with DR. ZIYAD MONTGOMERY on Dec 02, 2023 04:52:00   EST.    Authenticated and Electronically Signed by Lazaro Rich MD on  2023 04:53:13 AM  Signed: 23 by Lazaro Rich MD    Narrative:      FINAL REPORT    TECHNIQUE:  null    CLINICAL HISTORY:  3 weeks post c section, fever, incision drainage    COMPARISON:  null    FINDINGS:  CT Abdomen and Pelvis W Contrast    COMPARISON: CT - ABDOMEN/PELVIS W/O CONTRAST - 2015 04:36 PM EDT    FINDINGS:    Normal liver.    Splenomegaly.    Left renal cyst. Unremarkable right kidney. No hydronephrosis.    Normal adrenal glands.    Normal pancreas.    No visible gallstones. No biliary dilatation.    No evidence of bowel obstruction or colitis.    Dilated appendix measuring up to 2.2 cm in diameter, with adjacent fat stranding and small fluid. Appendicolith at the appendix base.    Unremarkable bladder.     scar in the anterior uterus. There is a small heterogeneous fluid collection anterior to the uterus (for example series 4, image 82). No contrast blush.    No pneumoperitoneum.    No lymphadenopathy.    No acute fracture.    No  abdominal aortic aneurysm.    Small fat containing umbilical hernia. Anterior abdominal wall  scar with no visible associated abscess.      Impression:      IMPRESSION:    Findings consistent with acute appendicitis.    Heterogeneous fluid collection anterior to the uterus, which could be due to infected fluid or blood products. No evidence of active bleeding.    Nonemergent/incidental findings in the report.    Authenticated and Electronically Signed by Lazaro Rich MD on  2023 04:48:29 AM          Orders (last 24 hrs)        Start     Ordered    23 0600  Basic Metabolic Panel  Morning Draw         23 0932    23 0600  CBC (No Diff)  Morning Draw         23 0932    23 1800  Oral Care  2 Times Daily       23 0932    23 1200  Vital Signs  Every 4 Hours       23 0932    23 1030  sodium chloride 0.9 % flush 10 mL  Every 12 Hours Scheduled         23 0932    23 1030  sennosides-docusate (PERICOLACE) 8.6-50 MG per tablet 2 tablet  2 Times Daily        See Hyperspace for full Linked Orders Report.    23 0932    23 1030  sodium chloride 0.9 % infusion  Continuous         23 0932    23 1000  Incentive Spirometry  Every 4 Hours While Awake       23 0932    23 0932  melatonin tablet 5 mg  Nightly PRN         23 0932    23 0932  Morphine sulfate (PF) injection 2 mg  Every 4 Hours PRN        See Hyperspace for full Linked Orders Report.    23 0932    23 0932  naloxone (NARCAN) injection 0.4 mg  Every 5 Minutes PRN        See Hyperspace for full Linked Orders Report.    23 0932    23 0932  Magnesium Standard Dose Replacement - Follow Nurse / BPA Driven Protocol  As Needed         23 0932    23 0932  Potassium Replacement - Follow Nurse / BPA Driven Protocol  As Needed         23 0932    23 0932  Ambulate Patient  Every Shift       23 0932    23  0932  Notify Provider (With Default Parameters)  Until Discontinued         12/02/23 0932    12/02/23 0932  Inpatient General Surgery Consult  Once        Specialty:  General Surgery  Provider:  Kelly Miranda DO    12/02/23 0932    12/02/23 0932  NPO Diet NPO Type: Strict NPO  Diet Effective Now         12/02/23 0932    12/02/23 0932  Inpatient Case Management  Consult  Once        Provider:  (Not yet assigned)    12/02/23 0932    12/02/23 0931  Intake & Output  Every Shift       12/02/23 0932    12/02/23 0931  Weigh patient  Once         12/02/23 0932    12/02/23 0931  Insert Peripheral IV  Once         12/02/23 0932    12/02/23 0931  Saline Lock & Maintain IV Access  Continuous         12/02/23 0932    12/02/23 0931  Place Sequential Compression Device  Once         12/02/23 0932    12/02/23 0931  Maintain Sequential Compression Device  Continuous         12/02/23 0932    12/02/23 0931  Code Status and Medical Interventions:  Continuous         12/02/23 0932    12/02/23 0930  sodium chloride 0.9 % flush 10 mL  As Needed         12/02/23 0932    12/02/23 0930  sodium chloride 0.9 % infusion 40 mL  As Needed         12/02/23 0932    12/02/23 0930  acetaminophen (TYLENOL) tablet 650 mg  Every 4 Hours PRN        See Hyperspace for full Linked Orders Report.    12/02/23 0932    12/02/23 0930  acetaminophen (TYLENOL) 160 MG/5ML oral solution 650 mg  Every 4 Hours PRN        See Hyperspace for full Linked Orders Report.    12/02/23 0932    12/02/23 0930  acetaminophen (TYLENOL) suppository 650 mg  Every 4 Hours PRN        See Hyperspace for full Linked Orders Report.    12/02/23 0932    12/02/23 0930  polyethylene glycol (MIRALAX) packet 17 g  Daily PRN        See Hyperspace for full Linked Orders Report.    12/02/23 0932    12/02/23 0930  bisacodyl (DULCOLAX) EC tablet 5 mg  Daily PRN        See Hyperspace for full Linked Orders Report.    12/02/23 0932    12/02/23 0930  bisacodyl (DULCOLAX)  suppository 10 mg  Daily PRN        See Hyperspace for full Linked Orders Report.    12/02/23 0932    12/02/23 0930  ondansetron (ZOFRAN) injection 4 mg  Every 6 Hours PRN         12/02/23 0932    12/02/23 0623  Initiate Observation Status  Once         12/02/23 0622    12/02/23 0623  Discontinue Cardiac Monitoring  Once         12/02/23 0622    12/02/23 0415  iopamidol (ISOVUE-300) 61 % injection 100 mL  Once in Imaging         12/02/23 0359    12/02/23 0406  ketorolac (TORADOL) injection 30 mg  Once         12/02/23 0350    12/02/23 0349  COVID-19 and FLU A/B PCR, 1 HR TAT - Swab, Nasopharynx  Once         12/02/23 0349    12/02/23 0349  Wound Culture - Surgical Site, Abdominal Wall  Once         12/02/23 0349    12/02/23 0349  CT Abdomen Pelvis With Contrast  1 Time Imaging         12/02/23 0349    12/02/23 0316  Urinalysis, Microscopic Only - Urine, Clean Catch  Once         12/02/23 0315    12/02/23 0241  NPO Diet NPO Type: Strict NPO  Diet Effective Now,   Status:  Canceled         12/02/23 0241    12/02/23 0241  Undress & Gown  Once         12/02/23 0241    12/02/23 0241  Cardiac Monitoring  Continuous,   Status:  Canceled        Comments: Follow Standing Orders As Outlined in Process Instructions (Open Order Report to View Full Instructions)    12/02/23 0241    12/02/23 0241  Continuous Pulse Oximetry  Continuous         12/02/23 0241    12/02/23 0241  Vital Signs  Per Hospital Policy         12/02/23 0241    12/02/23 0241  ECG 12 Lead ED Triage Standing Order; Abdominal Pain (Upper)  Once         12/02/23 0241    12/02/23 0241  Insert Peripheral IV  Once         12/02/23 0241    12/02/23 0241  [place] Draw  Once         12/02/23 0241    12/02/23 0241  CBC & Differential  Once         12/02/23 0241    12/02/23 0241  Comprehensive Metabolic Panel  Once         12/02/23 0241    12/02/23 0241  Lipase  Once         12/02/23 0241    12/02/23 0241  Single High Sensitivity Troponin T  Once         12/02/23 0241     12/02/23 0241  Urinalysis With Microscopic If Indicated (No Culture) - Urine, Clean Catch  Once         12/02/23 0241    12/02/23 0241  Green Top (Gel)  PROCEDURE ONCE         12/02/23 0241    12/02/23 0241  Lavender Top  PROCEDURE ONCE         12/02/23 0241    12/02/23 0241  Gold Top - SST  PROCEDURE ONCE         12/02/23 0241    12/02/23 0241  Light Blue Top  PROCEDURE ONCE         12/02/23 0241    12/02/23 0241  CBC Auto Differential  PROCEDURE ONCE         12/02/23 0241    12/02/23 0240  sodium chloride 0.9 % flush 10 mL  As Needed         12/02/23 0241    Unscheduled  Up With Assistance  As Needed       12/02/23 0932                  Physician Progress Notes (last 24 hours)  Notes from 12/01/23 1124 through 12/02/23 1124   No notes of this type exist for this encounter.       Consult Notes (last 24 hours)  Notes from 12/01/23 1124 through 12/02/23 1124   No notes of this type exist for this encounter.

## 2023-12-02 NOTE — ANESTHESIA PREPROCEDURE EVALUATION
Anesthesia Evaluation     Patient summary reviewed and Nursing notes reviewed   NPO Solid Status: Waived due to emergency  NPO Liquid Status: Waived due to emergency           Airway   Mallampati: II  TM distance: >3 FB  Neck ROM: full  Possible difficult intubation  Dental - normal exam         Pulmonary - negative pulmonary ROS and normal exam   Cardiovascular - normal exam  Exercise tolerance: good (4-7 METS)    ECG reviewed    (+) hypertension      Neuro/Psych- negative ROS  GI/Hepatic/Renal/Endo    (+) obesity, morbid obesity    Musculoskeletal (-) negative ROS    Abdominal   (-) obese   Substance History - negative use     OB/GYN negative ob/gyn ROS   (-)  Pregnant    Comment: 3 1/2 weeks post  section      Other - negative ROS       ROS/Med Hx Other: Labs reviewed                Anesthesia Plan    ASA 3 - emergent     general   Rapid sequence  (Risks and benefits discussed including risk of aspiration, recall and dental damage. All patient questions answered.    Will continue with plan of care.)  intravenous induction     Anesthetic plan, risks, benefits, and alternatives have been provided, discussed and informed consent has been obtained with: patient.  Pre-procedure education provided  Plan discussed with CRNA.      CODE STATUS:    Code Status (Patient has no pulse and is not breathing): CPR (Attempt to Resuscitate)  Medical Interventions (Patient has pulse or is breathing): Full Support

## 2023-12-02 NOTE — ANESTHESIA PROCEDURE NOTES
Peripheral Block    Pre-sedation assessment completed: 12/2/2023 4:52 PM    Patient reassessed immediately prior to procedure    Patient location during procedure: OR  Start time: 12/2/2023 4:53 PM  Stop time: 12/2/2023 4:56 PM  Reason for block: at surgeon's request and post-op pain management  Performed by  CRNA/CAA: Ash Tariq, CRNA  Preanesthetic Checklist  Completed: patient identified, IV checked, site marked, risks and benefits discussed, surgical consent, monitors and equipment checked, pre-op evaluation and timeout performed  Prep:  Pt Position: supine  Sterile barriers:gloves, cap, sterile barriers and mask  Prep: ChloraPrep  Patient monitoring: blood pressure monitoring, continuous pulse oximetry and EKG  Procedure    Sedation: yes  Performed under: general  Guidance:ultrasound guided    ULTRASOUND INTERPRETATION.  Using ultrasound guidance a 20 G gauge needle was placed in close proximity to the nerve, at which point, under ultrasound guidance anesthetic was injected in the area of the nerve and spread of the anesthesia was seen on ultrasound in close proximity thereto.  There were no abnormalities seen on ultrasound; a digital image was taken; and the patient tolerated the procedure with no complications. Images:still images obtained    Laterality:Bilateral  Block Type:TAP  Injection Technique:single-shot  Needle Type:echogenic  Needle Gauge:20 G  Resistance on Injection: none    Medications Used: ropivacaine (NAROPIN) injection 0.5 % - Peripheral Nerve   30 mL - 12/2/2023 4:53:00 PM      Medications  Preservative Free Saline:30ml  Comment:Block Injection: LA dose divided between Right and Left Block  Adjuncts per total volume      If required, intravenous sedation was given -- see meds on anesthesia record.    Post Assessment  Injection Assessment: negative aspiration for heme, no paresthesia on injection and incremental injection  Patient Tolerance:comfortable throughout  block  Complications:no  Additional Notes  Procedure:      BILATERAL TAP BLOCKS                             Patient analgesia was achieved with General Anesthesia    The pt was placed in the Supine Position and under Ultrasound guidance, an echogenic or touhy needle was advanced with Normal Saline hydro dissection of tissue.  The Internal Oblique and Transversus Abdominus muscles were visualized.  At or before the aponeurosis of Internal Oblique, the local anesthetic spread was visualized in the Transversus Abdominus Plane. Injection was made incrementally with aspiration every 5 mls.  There was no intravascular injection;  injection pressure was normal; there was no neural injection; and the procedure was completed without difficulty.

## 2023-12-02 NOTE — ANESTHESIA PROCEDURE NOTES
Airway  Urgency: elective    Date/Time: 12/2/2023 1:03 PM  End Time:12/2/2023 1:04 PM  Airway not difficult    General Information and Staff    Patient location during procedure: OR  CRNA/CAA: Ash Tariq CRNA    Indications and Patient Condition  Indications for airway management: airway protection    Preoxygenated: yes  MILS not maintained throughout  Mask difficulty assessment: 0 - not attempted    Final Airway Details  Final airway type: endotracheal airway      Successful airway: ETT  Cuffed: yes   Successful intubation technique: direct laryngoscopy  Facilitating devices/methods: cricoid pressure  Endotracheal tube insertion site: oral  Blade: Padmini  Blade size: 3  ETT size (mm): 7.0  Cormack-Lehane Classification: grade I - full view of glottis  Placement verified by: chest auscultation and capnometry   Cuff volume (mL): 7  Measured from: lips  ETT/EBT  to lips (cm): 20  Number of attempts at approach: 1  Assessment: lips, teeth, and gum same as pre-op and atraumatic intubation    Additional Comments  Negative epigastric sounds, Breath sound equal bilaterally with symmetric chest rise and fall

## 2023-12-02 NOTE — ED PROVIDER NOTES
"Subjective  History of Present Illness:    Chief Complaint: Abdominal pain    History of Present Illness: 33-year-old female presents with abdominal pain, fever 100.5 °F, at home tonight.  3-1/2 weeks status post .  Has had some incisional drainage since her procedure, no vomiting no urinary symptoms  Nurses Notes reviewed and agree, including vitals, allergies, social history and prior medical history.     REVIEW OF SYSTEMS: All systems reviewed and not pertinent unless noted.  Review of Systems      Positive for: Abdominal pain, fever,    Negative for: Flank pain urinary symptoms vomiting diarrhea    Past Medical History:   Diagnosis Date    Abnormal Pap smear of cervix     Hypertension     Pregnancy 2023    Recurrent pregnancy loss, antepartum condition or complication 2021/2022       Allergies:    Patient has no known allergies.      Past Surgical History:   Procedure Laterality Date    ADENOIDECTOMY       SECTION       SECTION N/A 2023    Procedure:  SECTION REPEAT;  Surgeon: Bri Jimenez MD;  Location: [facility];  Service: Obstetrics/Gynecology;  Laterality: N/A;         Social History     Socioeconomic History    Marital status:     Number of children: 1    Highest education level: High school graduate   Tobacco Use    Smoking status: Never    Smokeless tobacco: Never   Vaping Use    Vaping Use: Never used   Substance and Sexual Activity    Alcohol use: No    Drug use: No    Sexual activity: Yes     Partners: Male     Birth control/protection: None         Family History   Problem Relation Age of Onset    Hypertension Father     Stroke Father     Hyperlipidemia Mother     Stroke Mother         Blood clots       Objective  Physical Exam:  /71 (BP Location: Left arm, Patient Position: Sitting)   Pulse 82   Temp 98.4 °F (36.9 °C) (Oral)   Resp 18   Ht 167.6 cm (66\")   Wt 117 kg (257 lb)   LMP  (LMP Unknown)   SpO2 97%   Breastfeeding No  "  BMI 41.48 kg/m²      Physical Exam    CONSTITUTIONAL: Well developed, healthy appearing nontoxic 33-year-old female,  in mild discomfort.  VITAL SIGNS: per nursing, reviewed and noted  SKIN: exposed skin with no wound dehiscence to the  incision.   no deb-incisional fluctuance   EYES: Grossly EOMI, no icterus  ENT: Normal voice.  Moist mucous membranes   RESPIRATORY:  No increased work of breathing. No retractions.   CARDIOVASCULAR:   Extremities pink and warm.  Good cap refill to extremities.   GI: Abdomen without distention Diffuse lower abdominal tenderness palpation,  MUSCULOSKELETAL: Age-appropriate bulk and tone, moves all 4 extremities  NEUROLOGIC: Alert, oriented x 3. No gross deficits. GCS 15.   PSYCH: appropriate affect.  : no bladder tenderness or distention, no CVA tenderness    Procedures    ED Course:    Lab Results (last 24 hours)       Procedure Component Value Units Date/Time    Urinalysis With Microscopic If Indicated (No Culture) - Urine, Clean Catch [[id number]]  (Abnormal) Collected: 23    Specimen: Urine, Clean Catch Updated: 23     Color, UA Yellow     Appearance, UA Clear     pH, UA 5.5     Specific Gravity, UA 1.015     Glucose, UA Negative     Ketones, UA Negative     Bilirubin, UA Negative     Blood, UA Trace     Protein, UA Negative     Leuk Esterase, UA Trace     Nitrite, UA Negative     Urobilinogen, UA 0.2 E.U./dL    Urinalysis, Microscopic Only - Urine, Clean Catch [[id number]]  (Abnormal) Collected: 23    Specimen: Urine, Clean Catch Updated: 23 0352     RBC, UA None Seen /HPF      WBC, UA 0-2 /HPF      Bacteria, UA Trace /HPF      Squamous Epithelial Cells, UA 0-2 /HPF      Hyaline Casts, UA None Seen /LPF      Methodology Manual Light Microscopy    CBC & Differential [[id number]]  (Abnormal) Collected: 23    Specimen: Blood Updated: 23    Narrative:      The following orders were created for panel order CBC &  Differential.  Procedure                               Abnormality         Status                     ---------                               -----------         ------                     CBC Auto Differential[[id number]]        Abnormal            Final result                 Please view results for these tests on the individual orders.    Comprehensive Metabolic Panel [[id number]] Collected: 12/02/23 0308    Specimen: Blood Updated: 12/02/23 0407     Glucose 98 mg/dL      BUN 14 mg/dL      Creatinine 0.87 mg/dL      Sodium 141 mmol/L      Potassium 3.8 mmol/L      Chloride 105 mmol/L      CO2 24.8 mmol/L      Calcium 9.3 mg/dL      Total Protein 7.0 g/dL      Albumin 4.0 g/dL      ALT (SGPT) 23 U/L      AST (SGOT) 16 U/L      Alkaline Phosphatase 104 U/L      Total Bilirubin 0.4 mg/dL      Globulin 3.0 gm/dL      A/G Ratio 1.3 g/dL      BUN/Creatinine Ratio 16.1     Anion Gap 11.2 mmol/L      eGFR 90.3 mL/min/1.73     Narrative:      GFR Normal >60  Chronic Kidney Disease <60  Kidney Failure <15      Lipase [[id number]]  (Normal) Collected: 12/02/23 0308    Specimen: Blood Updated: 12/02/23 0359     Lipase 13 U/L     Single High Sensitivity Troponin T [[id number]]  (Normal) Collected: 12/02/23 0308    Specimen: Blood Updated: 12/02/23 0401     HS Troponin T <6 ng/L     Narrative:      High Sensitive Troponin T Reference Range:  <14.0 ng/L- Negative Female for AMI  <22.0 ng/L- Negative Male for AMI  >=14 - Abnormal Female indicating possible myocardial injury.  >=22 - Abnormal Male indicating possible myocardial injury.   Clinicians would have to utilize clinical acumen, EKG, Troponin, and serial changes to determine if it is an Acute Myocardial Infarction or myocardial injury due to an underlying chronic condition.         CBC Auto Differential [[id number]]  (Abnormal) Collected: 12/02/23 0308    Specimen: Blood Updated: 12/02/23 0315     WBC 8.89 10*3/mm3      RBC 3.71 10*6/mm3      Hemoglobin 10.0 g/dL       Hematocrit 31.3 %      MCV 84.4 fL      MCH 27.0 pg      MCHC 31.9 g/dL      RDW 13.6 %      RDW-SD 42.1 fl      MPV 9.8 fL      Platelets 259 10*3/mm3      Neutrophil % 75.4 %      Lymphocyte % 15.0 %      Monocyte % 7.6 %      Eosinophil % 0.9 %      Basophil % 0.3 %      Immature Grans % 0.8 %      Neutrophils, Absolute 6.70 10*3/mm3      Lymphocytes, Absolute 1.33 10*3/mm3      Monocytes, Absolute 0.68 10*3/mm3      Eosinophils, Absolute 0.08 10*3/mm3      Basophils, Absolute 0.03 10*3/mm3      Immature Grans, Absolute 0.07 10*3/mm3      nRBC 0.0 /100 WBC     COVID-19 and FLU A/B PCR, 1 HR TAT - Swab, Nasopharynx [[id number]]  (Normal) Collected: 12/02/23 0417    Specimen: Swab from Nasopharynx Updated: 12/02/23 0449     COVID19 Not Detected     Influenza A PCR Not Detected     Influenza B PCR Not Detected    Narrative:      Fact sheet for providers: https://www.fda.gov/media/662072/download    Fact sheet for patients: https://www.fda.gov/media/826749/download    Test performed by PCR.    Wound Culture - Surgical Site, Abdominal Wall [[id number]] Collected: 12/02/23 0418    Specimen: Surgical Site from Abdominal Wall Updated: 12/02/23 0512     Gram Stain Moderate (3+) WBCs seen      No No organisms seen             CT Abdomen Pelvis With Contrast    Addendum Date: 12/2/2023    ADDENDUM REPORT ADDENDUM: This report was discussed with DR. ZIYAD MONTGOMERY on Dec 02, 2023 04:52:00 EST. Authenticated and Electronically Signed by Lazaro Rich MD on 12/02/2023 04:53:13 AM    Result Date: 12/2/2023  FINAL REPORT TECHNIQUE: null CLINICAL HISTORY: 3 weeks post c section, fever, incision drainage COMPARISON: null FINDINGS: CT Abdomen and Pelvis W Contrast COMPARISON: CT - ABDOMEN/PELVIS W/O CONTRAST - 03/09/2015 04:36 PM EDT FINDINGS: Normal liver. Splenomegaly. Left renal cyst. Unremarkable right kidney. No hydronephrosis. Normal adrenal glands. Normal pancreas. No visible gallstones. No biliary dilatation. No evidence of bowel  obstruction or colitis. Dilated appendix measuring up to 2.2 cm in diameter, with adjacent fat stranding and small fluid. Appendicolith at the appendix base. Unremarkable bladder.  scar in the anterior uterus. There is a small heterogeneous fluid collection anterior to the uterus (for example series 4, image 82). No contrast blush. No pneumoperitoneum. No lymphadenopathy. No acute fracture. No abdominal aortic aneurysm. Small fat containing umbilical hernia. Anterior abdominal wall  scar with no visible associated abscess.     Impression: IMPRESSION: Findings consistent with acute appendicitis. Heterogeneous fluid collection anterior to the uterus, which could be due to infected fluid or blood products. No evidence of active bleeding. Nonemergent/incidental findings in the report. Authenticated and Electronically Signed by Lazaro Rich MD on 2023 04:48:29 AM      MDM     Amount and/or Complexity of Data Reviewed  Clinical lab tests: reviewed  Tests in the radiology section of CPT®: reviewed  Tests in the medicine section of CPT®: reviewed        ED Course as of 23 0622   Sat Dec 02, 2023   0319 EKG interpreted by me reveals sinus rhythm 85 no ectopy no ischemic changes [PF]      ED Course User Index  [PF] Derrick Cedeno, DO       Medical Decision Making:    Initial impression of presenting illness:   DDX: includes but is not limited to: 33-year-old female presents with abdominal pain, fever 100.5 °F, at home tonight.  3-1/2 weeks status post .  Has had some incisional drainage since her procedure, no vomiting no urinary symptoms         Patient arrives normotensive afebrile nontachycardic sats 97% with vitals interpreted by myself.     Pertinent features from physical exam: Evidence of minimal serous drainage at the margin of the surgical incision without dehiscence  incision.  Diffuse lower abdominal tenderness.  Serial abdominal examinations with localization of pain  to the right lower quadrant.    Initial diagnostic plan: CBC CMP flu covid UA, added Gram stain and culture to suspected seroma site.  Added CT abdomen pelvis to rule out fluid collection/abscess    Results from initial plan were reviewed and interpreted by me revealing nonactionable flu covid, CBC CMP UA.  CT abdomen pelvis with Dilated appendix measuring up to 2.2 cm in diameter, with adjacent fat stranding and small fluid. Appendicolith at the appendix base.       Diagnostic information from other sources: Old records reviewed    Interventions / Re-evaluation: Toradol on arrival    Results/clinical rationale were discussed with patient  Who advised she had a ruptured appendicitis at the age of 11.    Consultations/Discussion of results with other physicians: Dr. Miranda surgeon, recommended observation, plan to repeat CT abdomen pelvis with contrast in 12 to 24 hours.  Withhold antibiotics at this time.    Discussed with Dr. Caro hospitalist service will admit    Disposition plan: Admission for further care  -----      Final diagnoses:   Right lower quadrant abdominal pain            Derrick Cedeno, DO  12/02/23 0627

## 2023-12-02 NOTE — PLAN OF CARE
Problem: Adult Inpatient Plan of Care  Goal: Plan of Care Review  Outcome: Ongoing, Progressing  Goal: Patient-Specific Goal (Individualized)  Outcome: Ongoing, Progressing  Goal: Absence of Hospital-Acquired Illness or Injury  Outcome: Ongoing, Progressing  Goal: Optimal Comfort and Wellbeing  Outcome: Ongoing, Progressing  Goal: Readiness for Transition of Care  Outcome: Ongoing, Progressing  Intervention: Mutually Develop Transition Plan  Recent Flowsheet Documentation  Taken 12/2/2023 0658 by Monet Liu RN  Equipment Currently Used at Home: none  Transportation Anticipated: family or friend will provide  Patient/Family Anticipated Services at Transition: none  Patient/Family Anticipates Transition to: home     Problem: Pain Acute  Goal: Acceptable Pain Control and Functional Ability  Outcome: Ongoing, Progressing     Problem: Impaired Wound Healing  Goal: Optimal Wound Healing  Outcome: Ongoing, Progressing   Goal Outcome Evaluation:

## 2023-12-02 NOTE — CONSULTS
Reason for Consultation: Appendicitis      Chief complaint:  Abdominal pain    SUBJECTIVE:    History of present illness:  Patient is a 33 year old female status post  3.5 weeks ago who presented to the ED with 3 days of abdominal pain. Patient states that about 3 days ago she developed right lower quadrant abdominal pain that was sharp and got progressively worse. Last night around 1AM she took her temperature and it was 100.5 so she presented to the ED. She denies nausea, vomiting, diarrhea. She has been having normal bowel movements. She has an appetite. Patient states that her postoperative course after her  has been uneventful, but she does have some minimal drainage from her incision. Of note, patient states that she had an open appendectomy when she was 11 for a severe perforated appendicitis.     CT abdomen completed demonstrates concerns for acute appendicitis with fat stranding in the right lower quadrant and evidence of appendicoliths. She has been afebrile with stable vital signs. No leukocytosis. Overnight, she has continued to have persistent right lower quadrant abdominal pain.    Review of Systems:    Review of Systems - General ROS: negative for - chills, fatigue, fever, hot flashes, malaise or night sweats  Psychological ROS: negative for - behavioral disorder, disorientation, hallucinations, hostility or mood swings  ENT ROS: negative for - nasal polyps, oral lesions, sinus pain, sneezing or sore throat  Breast ROS: negative for - galactorrhea or new or changing breast lumps  Respiratory ROS: negative for - hemoptysis, orthopnea, pleuritic pain, sputum changes or stridor  Cardiovascular ROS: negative for - dyspnea on exertion, edema, irregular heartbeat, murmur, orthopnea, palpitations or rapid heart rate  Gastrointestinal ROS: abdominal pain  Genito-Urinary ROS: negative for - dysuria, genital ulcers, nocturia or pelvic pain  Musculoskeletal ROS: negative for - gait  disturbance or muscle pain  Neurological ROS: negative for - dizziness, gait disturbance, memory loss, numbness/tingling or seizures      Allergies:  No Known Allergies    Medications:    Current Facility-Administered Medications:     acetaminophen (TYLENOL) tablet 650 mg, 650 mg, Oral, Q4H PRN **OR** acetaminophen (TYLENOL) 160 MG/5ML oral solution 650 mg, 650 mg, Oral, Q4H PRN **OR** acetaminophen (TYLENOL) suppository 650 mg, 650 mg, Rectal, Q4H PRN, Joe Webb MD    sennosides-docusate (PERICOLACE) 8.6-50 MG per tablet 2 tablet, 2 tablet, Oral, BID **AND** polyethylene glycol (MIRALAX) packet 17 g, 17 g, Oral, Daily PRN **AND** bisacodyl (DULCOLAX) EC tablet 5 mg, 5 mg, Oral, Daily PRN **AND** bisacodyl (DULCOLAX) suppository 10 mg, 10 mg, Rectal, Daily PRN, Joe Webb MD    Magnesium Standard Dose Replacement - Follow Nurse / BPA Driven Protocol, , Does not apply, PRN, Joe Webb MD    melatonin tablet 5 mg, 5 mg, Oral, Nightly PRN, Joe Webb MD    Morphine sulfate (PF) injection 2 mg, 2 mg, Intravenous, Q4H PRN **AND** naloxone (NARCAN) injection 0.4 mg, 0.4 mg, Intravenous, Q5 Min PRN, Joe Webb MD    ondansetron (ZOFRAN) injection 4 mg, 4 mg, Intravenous, Q6H PRN, Joe Webb MD    Potassium Replacement - Follow Nurse / BPA Driven Protocol, , Does not apply, PRN, Joe Webb MD    sodium chloride 0.9 % flush 10 mL, 10 mL, Intravenous, PRN, Joe Webb MD    sodium chloride 0.9 % flush 10 mL, 10 mL, Intravenous, Q12H, Joe Webb MD    sodium chloride 0.9 % flush 10 mL, 10 mL, Intravenous, PRNTracey Hossam, MD    sodium chloride 0.9 % infusion 40 mL, 40 mL, Intravenous, PRN, Joe Webb MD    sodium chloride 0.9 % infusion, 100 mL/hr, Intravenous, Continuous, Joe Webb MD, Last Rate: 100 mL/hr at 12/02/23 1001, 100 mL/hr at 12/02/23 1001    History:  Past Medical History:   Diagnosis Date    Abnormal Pap smear of cervix     Hypertension      Pregnancy 2023    Recurrent pregnancy loss, antepartum condition or complication 2021/2022       Past Surgical History:   Procedure Laterality Date    ADENOIDECTOMY       SECTION       SECTION N/A 2023    Procedure:  SECTION REPEAT;  Surgeon: Bri Jimenez MD;  Location: [facility];  Service: Obstetrics/Gynecology;  Laterality: N/A;       Family History   Problem Relation Age of Onset    Hypertension Father     Stroke Father     Hyperlipidemia Mother     Stroke Mother         Blood clots       Social History     Tobacco Use    Smoking status: Never    Smokeless tobacco: Never   Vaping Use    Vaping Use: Never used   Substance Use Topics    Alcohol use: No    Drug use: No          OBJECTIVE:    Vital Signs   Temp:  [97.9 °F (36.6 °C)-98.4 °F (36.9 °C)] 97.9 °F (36.6 °C)  Heart Rate:  [70-97] 70  Resp:  [18] 18  BP: (114-140)/(46-74) 116/46    Physical Exam:     General Appearance:    Alert, cooperative, in no acute distress   Head:    Normocephalic, without obvious abnormality, atraumatic   Eyes:            Lids and lashes normal, conjunctivae and sclerae normal, no   icterus, no pallor, corneas clear, PERRLA   Throat:   No oral lesions, no thrush, oral mucosa moist   Neck:   No adenopathy, supple, trachea midline, no thyromegaly, no   carotid bruit, no JVD   Lungs:     Clear to auscultation,respirations regular, even and                  unlabored    Heart:    Regular rhythm and normal rate, normal S1 and S2, no            murmur, no gallop, no rub, no click   Chest Wall:    No abnormalities observed   Abdomen:    Obese, soft, tender to palpation in right lower quadrant, evidence of right lower quadrant incision from previous open appendectomy,  incision has some small areas of dehiscence draining serous fluid, no evidence of infection   Extremities:   Moves all extremities well, no edema, no cyanosis, no             redness   Skin:   No bleeding, bruising or rash    Neurologic:   Cranial nerves 2 - 12 grossly intact, sensation intact, DTR       present and equal bilaterally       Results Review:   I reviewed the patient's new clinical results.  I reviewed the patient's new imaging results and agree with the interpretation.    I did personally review the patient's CT abdomen and agree there is fat stranding in the right lower quadrant concerning for appendicitis with associated appendicoliths versus surgical staples.    ASSESSMENT/PLAN:    Right lower quadrant pain  Suspicion for acute appendicitis    Patient is a healthy 33 year old female 3 weeks status post  presenting with physical exam and imaging findings concerning for acute appendicitis. Patient has a remote history of an open appendectomy for severe perforated appendicitis and it is possible that the appendix wasn't removed entirely if there was significant inflammation/perforation. This was discussed with the patient and her mother at bedside. There is certainly evidence of fat stranding in the right lower quadrant, therefore, I think it reasonable to proceed with a diagnostic laparoscopy. If there is evidence of acute appendicitis will proceed with appendectomy. I also discussed the possibility of lysis of adhesions secondary to the open surgery she had previously. The procedure and risks including bleeding, infection, damage to surrounding structures, and need for additional surgeries was discussed. They express understanding and all questions were answered.     I discussed the patients findings and my recommendations with patient and consulting provider.        Kelly Miranda,   23

## 2023-12-02 NOTE — CASE MANAGEMENT/SOCIAL WORK
Discharge Planning Assessment  [facility]     Patient Name: Lauren Carrasco  MRN: [id number]  Today's Date: 12/2/2023    Admit Date: 12/2/2023    Plan: home   Discharge Needs Assessment       Row Name 12/02/23 1059       Living Environment    People in Home spouse;child(richard), dependent    Name(s) of People in Home Spouse  Cal Carrasco   and  4 children  ages  3 1/2 weeks   to age 18    Current Living Arrangements home    Potentially Unsafe Housing Conditions --  none    In the past 12 months has the electric, gas, oil, or water company threatened to shut off services in your home? No    Primary Care Provided by self    Family Caregiver if Needed spouse    Family Caregiver Names cal    Able to Return to Prior Arrangements yes       Resource/Environmental Concerns    Resource/Environmental Concerns none    Transportation Concerns none       Food Insecurity    Within the past 12 months, you worried that your food would run out before you got the money to buy more. Never true    Within the past 12 months, the food you bought just didn't last and you didn't have money to get more. Never true       Transition Planning    Patient/Family Anticipates Transition to home with family    Transportation Anticipated family or friend will provide       Discharge Needs Assessment    Readmission Within the Last 30 Days no previous admission in last 30 days    Equipment Currently Used at Home none    Anticipated Changes Related to Illness none    Equipment Needed After Discharge none    Provided Post Acute Provider List? N/A    Provided Post Acute Provider Quality & Resource List? N/A                   Discharge Plan       Row Name 12/02/23 1106       Plan    Plan home    Plan Comments independent  of ADL's,  works full time on [employer] leave at present.  Lives with spouse E;taiwo Carrasco  [phone],   has 4 children in house,  3 1/2 weeks   to  18yr old.   No Dme, no oxygen, no home health, no advance  directives, own POA,  plans on returning home at discharge.  Spouse will transport                  Continued Care and Services - Admitted Since 12/2/2023    Coordination has not been started for this encounter.       Selected Continued Care - Episodes Includes continued care and service providers with selected services from the active episodes listed below      [facility] Episode start date: 5/31/2023   There are no active outsourced providers for this episode.                    Demographic Summary       Row Name 12/02/23 1058       General Information    Admission Type observation    Referral Source admission list    Preferred Language English                   Functional Status       Row Name 12/02/23 1058       Functional Status    Usual Activity Tolerance excellent    Current Activity Tolerance good       Physical Activity    On average, how many days per week do you engage in moderate to strenuous exercise (like a brisk walk)? 0 days    On average, how many minutes do you engage in exercise at this level? 0 min    Number of minutes of exercise per week 0       Assessment of Health Literacy    How often do you have someone help you read hospital materials? Occasionally    How often do you have problems learning about your medical condition because of difficulty understanding written information? Occasionally    How often do you have a problem understanding what is told to you about your medical condition? Occasionally    How confident are you filling out medical forms by yourself? Quite a bit    Health Literacy Good       Functional Status, IADL    Medications independent    Meal Preparation independent    Housekeeping independent    Laundry independent    Shopping independent       Mental Status Summary    Recent Changes in Mental Status/Cognitive Functioning no changes       Employment/    Employment Status employed full-time                   Psychosocial       Row Name 12/02/23 9915        Mental Health    Little Interest or Pleasure in Doing Things 0-->not at all    Feeling Down, Depressed or Hopeless 0-->not at all    Do you feel stress - tense, restless, nervous, or anxious, or unable to sleep at night because your mind is troubled all the time - these days? Not at all       C-SSRS (Recent)    Q1 Wished to be Dead (Past Month) no    Q2 Suicidal Thoughts (Past Month) no    Q6 Suicide Behavior (Lifetime) no       Violence Risk    Feels Like Hurting Others no    Previous Attempt to Harm Others no                   Abuse/Neglect    No documentation.                  Legal       Row Name 12/02/23 1051       Financial Resource Strain    How hard is it for you to pay for the very basics like food, housing, medical care, and heating? Not very       Financial/Legal    Source of Income salary/wages    Financial/Environmental Concerns --  none                   Substance Abuse    No documentation.                  Patient Forms    No documentation.                     Lulú House RN

## 2023-12-02 NOTE — ANESTHESIA POSTPROCEDURE EVALUATION
Patient: Lauren Carrasco    Procedure Summary       Date: 12/02/23 Room / Location: [facility] OR  /  [facility]    Anesthesia Start: 1257 Anesthesia Stop: 1709    Procedure: DIAGNOSTIC LAPAROSCOPY with lysis of adhesions, exploratory laparatomy with ileocecectomy (Abdomen) Diagnosis:     Surgeons: Kelly Miranda DO Provider: Ash Tariq CRNA    Anesthesia Type: general ASA Status: 3 - Emergent            Anesthesia Type: general    Vitals  No vitals data found for the desired time range.          Post Anesthesia Care and Evaluation    Patient location during evaluation: PACU  Patient participation: complete - patient participated  Level of consciousness: awake and alert  Pain score: 2  Pain management: satisfactory to patient    Airway patency: patent  Anesthetic complications: No anesthetic complications  PONV Status: none  Cardiovascular status: acceptable and stable  Respiratory status: acceptable and spontaneous ventilation  Hydration status: acceptable    Comments: Vitals signs as noted in nursing documentation as per protocol.

## 2023-12-03 PROBLEM — R10.31 ABDOMINAL PAIN, RIGHT LOWER QUADRANT: Status: ACTIVE | Noted: 2023-12-03

## 2023-12-03 LAB
ANION GAP SERPL CALCULATED.3IONS-SCNC: 10.3 MMOL/L (ref 5–15)
BUN SERPL-MCNC: 10 MG/DL (ref 6–20)
BUN/CREAT SERPL: 13.3 (ref 7–25)
CALCIUM SPEC-SCNC: 8.9 MG/DL (ref 8.6–10.5)
CHLORIDE SERPL-SCNC: 103 MMOL/L (ref 98–107)
CO2 SERPL-SCNC: 22.7 MMOL/L (ref 22–29)
CREAT SERPL-MCNC: 0.75 MG/DL (ref 0.57–1)
DEPRECATED RDW RBC AUTO: 43.1 FL (ref 37–54)
EGFRCR SERPLBLD CKD-EPI 2021: 108 ML/MIN/1.73
ERYTHROCYTE [DISTWIDTH] IN BLOOD BY AUTOMATED COUNT: 13.6 % (ref 12.3–15.4)
GLUCOSE SERPL-MCNC: 143 MG/DL (ref 65–99)
HCT VFR BLD AUTO: 31.8 % (ref 34–46.6)
HGB BLD-MCNC: 10 G/DL (ref 12–15.9)
MCH RBC QN AUTO: 26.9 PG (ref 26.6–33)
MCHC RBC AUTO-ENTMCNC: 31.4 G/DL (ref 31.5–35.7)
MCV RBC AUTO: 85.5 FL (ref 79–97)
PLATELET # BLD AUTO: 279 10*3/MM3 (ref 140–450)
PMV BLD AUTO: 10 FL (ref 6–12)
POTASSIUM SERPL-SCNC: 4.2 MMOL/L (ref 3.5–5.2)
RBC # BLD AUTO: 3.72 10*6/MM3 (ref 3.77–5.28)
SODIUM SERPL-SCNC: 136 MMOL/L (ref 136–145)
WBC NRBC COR # BLD AUTO: 11.88 10*3/MM3 (ref 3.4–10.8)

## 2023-12-03 PROCEDURE — 25010000002 ONDANSETRON PER 1 MG: Performed by: STUDENT IN AN ORGANIZED HEALTH CARE EDUCATION/TRAINING PROGRAM

## 2023-12-03 PROCEDURE — 80048 BASIC METABOLIC PNL TOTAL CA: CPT | Performed by: STUDENT IN AN ORGANIZED HEALTH CARE EDUCATION/TRAINING PROGRAM

## 2023-12-03 PROCEDURE — 99024 POSTOP FOLLOW-UP VISIT: CPT | Performed by: STUDENT IN AN ORGANIZED HEALTH CARE EDUCATION/TRAINING PROGRAM

## 2023-12-03 PROCEDURE — 25010000002 CEFAZOLIN SODIUM-DEXTROSE 2-3 GM-%(50ML) RECONSTITUTED SOLUTION: Performed by: STUDENT IN AN ORGANIZED HEALTH CARE EDUCATION/TRAINING PROGRAM

## 2023-12-03 PROCEDURE — 99252 IP/OBS CONSLTJ NEW/EST SF 35: CPT | Performed by: OBSTETRICS & GYNECOLOGY

## 2023-12-03 PROCEDURE — 25010000002 PROCHLORPERAZINE 10 MG/2ML SOLUTION: Performed by: INTERNAL MEDICINE

## 2023-12-03 PROCEDURE — 85027 COMPLETE CBC AUTOMATED: CPT | Performed by: STUDENT IN AN ORGANIZED HEALTH CARE EDUCATION/TRAINING PROGRAM

## 2023-12-03 PROCEDURE — 99232 SBSQ HOSP IP/OBS MODERATE 35: CPT | Performed by: FAMILY MEDICINE

## 2023-12-03 PROCEDURE — 25010000002 MORPHINE PER 10 MG: Performed by: INTERNAL MEDICINE

## 2023-12-03 PROCEDURE — 25810000003 LACTATED RINGERS PER 1000 ML: Performed by: STUDENT IN AN ORGANIZED HEALTH CARE EDUCATION/TRAINING PROGRAM

## 2023-12-03 PROCEDURE — 25010000002 PIPERACILLIN SOD-TAZOBACTAM PER 1 G: Performed by: FAMILY MEDICINE

## 2023-12-03 RX ORDER — HYDROCODONE BITARTRATE AND ACETAMINOPHEN 7.5; 325 MG/1; MG/1
1 TABLET ORAL EVERY 6 HOURS PRN
Status: DISCONTINUED | OUTPATIENT
Start: 2023-12-03 | End: 2023-12-06 | Stop reason: HOSPADM

## 2023-12-03 RX ORDER — NALOXONE HCL 0.4 MG/ML
0.4 VIAL (ML) INJECTION
Status: DISCONTINUED | OUTPATIENT
Start: 2023-12-03 | End: 2023-12-06 | Stop reason: HOSPADM

## 2023-12-03 RX ADMIN — PROCHLORPERAZINE EDISYLATE 5 MG: 5 INJECTION INTRAMUSCULAR; INTRAVENOUS at 05:15

## 2023-12-03 RX ADMIN — ACETAMINOPHEN 650 MG: 325 TABLET, FILM COATED ORAL at 07:41

## 2023-12-03 RX ADMIN — MORPHINE SULFATE 4 MG: 4 INJECTION, SOLUTION INTRAMUSCULAR; INTRAVENOUS at 09:10

## 2023-12-03 RX ADMIN — CEFAZOLIN SODIUM 2000 MG: 2 SOLUTION INTRAVENOUS at 09:57

## 2023-12-03 RX ADMIN — MORPHINE SULFATE 4 MG: 4 INJECTION, SOLUTION INTRAMUSCULAR; INTRAVENOUS at 22:34

## 2023-12-03 RX ADMIN — ONDANSETRON 4 MG: 2 INJECTION INTRAMUSCULAR; INTRAVENOUS at 02:03

## 2023-12-03 RX ADMIN — MORPHINE SULFATE 4 MG: 4 INJECTION, SOLUTION INTRAMUSCULAR; INTRAVENOUS at 01:10

## 2023-12-03 RX ADMIN — CEFAZOLIN SODIUM 2000 MG: 2 SOLUTION INTRAVENOUS at 01:11

## 2023-12-03 RX ADMIN — HYDROCODONE BITARTRATE AND ACETAMINOPHEN 1 TABLET: 7.5; 325 TABLET ORAL at 21:10

## 2023-12-03 RX ADMIN — ONDANSETRON 4 MG: 2 INJECTION INTRAMUSCULAR; INTRAVENOUS at 09:18

## 2023-12-03 RX ADMIN — DOCUSATE SODIUM 50MG AND SENNOSIDES 8.6MG 2 TABLET: 8.6; 5 TABLET, FILM COATED ORAL at 20:05

## 2023-12-03 RX ADMIN — DICLOFENAC SODIUM 2 G: 10 GEL TOPICAL at 12:25

## 2023-12-03 RX ADMIN — HYDROCODONE BITARTRATE AND ACETAMINOPHEN 1 TABLET: 7.5; 325 TABLET ORAL at 15:06

## 2023-12-03 RX ADMIN — SODIUM CHLORIDE, POTASSIUM CHLORIDE, SODIUM LACTATE AND CALCIUM CHLORIDE 100 ML/HR: 600; 310; 30; 20 INJECTION, SOLUTION INTRAVENOUS at 15:05

## 2023-12-03 RX ADMIN — ONDANSETRON 4 MG: 2 INJECTION INTRAMUSCULAR; INTRAVENOUS at 18:35

## 2023-12-03 RX ADMIN — PIPERACILLIN SODIUM AND TAZOBACTAM SODIUM 3.38 G: 3; .375 INJECTION, SOLUTION INTRAVENOUS at 21:12

## 2023-12-03 RX ADMIN — PROCHLORPERAZINE EDISYLATE 5 MG: 5 INJECTION INTRAMUSCULAR; INTRAVENOUS at 22:31

## 2023-12-03 RX ADMIN — PIPERACILLIN SODIUM AND TAZOBACTAM SODIUM 3.38 G: 3; .375 INJECTION, SOLUTION INTRAVENOUS at 15:06

## 2023-12-03 RX ADMIN — METRONIDAZOLE 500 MG: 500 TABLET ORAL at 05:15

## 2023-12-03 RX ADMIN — MORPHINE SULFATE 4 MG: 4 INJECTION, SOLUTION INTRAMUSCULAR; INTRAVENOUS at 05:15

## 2023-12-03 RX ADMIN — PROCHLORPERAZINE EDISYLATE 5 MG: 5 INJECTION INTRAMUSCULAR; INTRAVENOUS at 12:24

## 2023-12-03 RX ADMIN — ACETAMINOPHEN 650 MG: 325 TABLET, FILM COATED ORAL at 12:01

## 2023-12-03 RX ADMIN — MORPHINE SULFATE 4 MG: 4 INJECTION, SOLUTION INTRAMUSCULAR; INTRAVENOUS at 12:51

## 2023-12-03 RX ADMIN — MORPHINE SULFATE 4 MG: 4 INJECTION, SOLUTION INTRAMUSCULAR; INTRAVENOUS at 18:34

## 2023-12-03 NOTE — CONSULTS
Subjective   Chief Complaint   Patient presents with    Abdominal Pain     Lauren Carrasco is a 33 y.o. year old .  No LMP recorded (lmp unknown).  She presents to be seen because of request of general surgery secondary to wound separation.  Patient had  .  Op report notes extensive lysis of adhesions predominantly left-sided.  She is having recurrent fevers presented at which time stated appendicitis was noted at the ED on CT.  Underwent diagnostic laparoscopy with conversion to laparotomy-see operative report-appendix never seen per general surgery.  Per general surgery wound dehiscence noted.  Per ED note there was may be minimal drainage but incision was intact and healed so unsure exactly when this happened.    OTHER COMPLAINTS:  Nothing else    The following portions of the patient's history were reviewed and updated as appropriate:She  has a past medical history of Abnormal Pap smear of cervix, Hypertension, Pregnancy (2023), and Recurrent pregnancy loss, antepartum condition or complication (2021/2022).  She does not have any pertinent problems on file.  She  has a past surgical history that includes Adenoidectomy;  section; and  section (N/A, 2023).  Her family history includes Hyperlipidemia in her mother; Hypertension in her father; Stroke in her father and mother.  She  reports that she has never smoked. She has never used smokeless tobacco. She reports that she does not drink alcohol and does not use drugs.  Current Facility-Administered Medications   Medication Dose Route Frequency Provider Last Rate Last Admin    acetaminophen (TYLENOL) tablet 650 mg  650 mg Oral Q4H PRN Ruth, Kelly B, DO   650 mg at 23    Or    acetaminophen (TYLENOL) 160 MG/5ML oral solution 650 mg  650 mg Oral Q4H PRN Ruth, Kelly B, DO        Or    acetaminophen (TYLENOL) suppository 650 mg  650 mg Rectal Q4H PRN Ruth, Kelly B, DO         sennosides-docusate (PERICOLACE) 8.6-50 MG per tablet 2 tablet  2 tablet Oral BID Ruth, Kelly B, DO        And    polyethylene glycol (MIRALAX) packet 17 g  17 g Oral Daily PRN Ruth, Kelly B, DO        And    bisacodyl (DULCOLAX) EC tablet 5 mg  5 mg Oral Daily PRN Ruth, Kelly B, DO        And    bisacodyl (DULCOLAX) suppository 10 mg  10 mg Rectal Daily PRN Ruth, Kelly B, DO        ceFAZolin Sodium-Dextrose (ANCEF) IVPB (duplex) 2,000 mg  2,000 mg Intravenous Q8H Ruth, Kelly B, DO   2,000 mg at 12/03/23 0111    lactated ringers infusion  100 mL/hr Intravenous Continuous Ruth, Kelly B,  mL/hr at 12/02/23 1834 100 mL/hr at 12/02/23 1834    Magnesium Standard Dose Replacement - Follow Nurse / BPA Driven Protocol   Does not apply PRN Ruth, Kelly B, DO        melatonin tablet 5 mg  5 mg Oral Nightly PRN Ruth, Kelly B, DO        metroNIDAZOLE (FLAGYL) tablet 500 mg  500 mg Oral Q8H Ruth, Kelly B, DO   500 mg at 12/03/23 0515    morphine injection 4 mg  4 mg Intravenous Q4H PRN Alonso Younger DO   4 mg at 12/03/23 0515    And    naloxone (NARCAN) injection 0.4 mg  0.4 mg Intravenous Q5 Min PRN Alonso Younger DO        ondansetron (ZOFRAN) injection 4 mg  4 mg Intravenous Q6H PRN Ruth, Kelly B, DO   4 mg at 12/03/23 0203    Potassium Replacement - Follow Nurse / BPA Driven Protocol   Does not apply PRN Ruth, Kelly B, DO        prochlorperazine (COMPAZINE) injection 5 mg  5 mg Intravenous Q6H PRN Alonso Younger DO   5 mg at 12/03/23 0515    Or    prochlorperazine (COMPAZINE) tablet 5 mg  5 mg Oral Q6H PRN Alonso Younger DO        Or    prochlorperazine (COMPAZINE) suppository 25 mg  25 mg Rectal Q12H PRN Aren, Alonso, DO        sodium chloride 0.9 % flush 10 mL  10 mL Intravenous PRN Kelly Miranda, DO         No current facility-administered medications on file prior to encounter.     Current Outpatient Medications on File Prior to Encounter   Medication  "Sig    acetaminophen (TYLENOL) 500 MG tablet Take 2 tablets by mouth Every 8 (Eight) Hours As Needed for Mild Pain.    ferrous sulfate 325 (65 FE) MG tablet Take 1 tablet by mouth 2 (Two) Times a Day Before Meals.    ibuprofen (ADVIL,MOTRIN) 800 MG tablet Take 1 tablet by mouth Every 8 (Eight) Hours As Needed for Mild Pain.    Prenatal Vit-Fe Fumarate-FA (Prenatal Vitamin) 27-0.8 MG tablet Take 1 tablet by mouth Daily.    prenatal vitamin (prenatal, CLASSIC, vitamin) tablet Take  by mouth Daily.     She has No Known Allergies.    Social History    Tobacco Use      Smoking status: Never      Smokeless tobacco: Never    Review of Systems  Consitutional POS: nothing reported    NEG: anorexia or night sweats   Gastointestinal POS: nothing reported    NEG: bloating, change in bowel habits, melena, or reflux symptoms   Genitourinary POS: nothing reported    NEG: dysuria or hematuria   Integument POS: nothing reported    NEG: moles that are changing in size, shape, color or rashes   Breast POS: nothing reported    NEG: persistent breast lump, skin dimpling, or nipple discharge         Respiratory: negative  Cardiovascular: negative  GYN: See HPI          Objective   /64 (BP Location: Left arm, Patient Position: Lying)   Pulse 89   Temp 99.3 °F (37.4 °C) (Oral)   Resp 18   Ht 167.6 cm (66\")   Wt 115 kg (252 lb 6.8 oz)   LMP  (LMP Unknown)   SpO2 96%   Breastfeeding No   BMI 40.74 kg/m²     General:  well developed; well nourished  no acute distress   Skin:  No suspicious lesions seen   Thyroid: normal to inspection and palpation   Lungs:  breathing is unlabored  clear to auscultation bilaterally   Heart:  regular rate and rhythm, S1, S2 normal, no murmur, click, rub or gallop   Breasts:  Not performed.   Abdomen: no hepato-splenomegaly  incision is clean, dry, intact, and without drainage  Mechanical wound dehiscence right lateral margin  scar   Pelvis: Not performed.     Psychiatric: Alert and " oriented ×3, mood and affect appropriate  HEENT: Atraumatic, normocephalic, normal scleral icterus  Extremities: 2+ pulses bilaterally, no edema      Lab Review   BMP and CBC    Imaging   CT of abdomen/pelvis report        Assessment   Repeat   with noted lysis of dense adhesions in the left side-laboratory values pre and postoperatively of the  were stable and normal.  Abscess right lower quadrant unsure etiology based on surgical notes  Mechanical source wound separation from prior  site-the tissue is red healthy no evidence of infection.  Suspect this was a weakened area and with mechanical trauma has been      Plan   I would leave  site open to air avoid manipulation and continue antibiotics per general surgery though Zosyn may be entertained as well.      New Medications Ordered This Visit   Medications    sodium chloride 0.9 % flush 10 mL    ketorolac (TORADOL) injection 30 mg    iopamidol (ISOVUE-300) 61 % injection 100 mL    OR Linked Order Group     acetaminophen (TYLENOL) tablet 650 mg     acetaminophen (TYLENOL) 160 MG/5ML oral solution 650 mg     acetaminophen (TYLENOL) suppository 650 mg    AND Linked Order Group     sennosides-docusate (PERICOLACE) 8.6-50 MG per tablet 2 tablet     polyethylene glycol (MIRALAX) packet 17 g     bisacodyl (DULCOLAX) EC tablet 5 mg     bisacodyl (DULCOLAX) suppository 10 mg    ondansetron (ZOFRAN) injection 4 mg    Potassium Replacement - Follow Nurse / BPA Driven Protocol    Magnesium Standard Dose Replacement - Follow Nurse / BPA Driven Protocol    melatonin tablet 5 mg    lactated ringers infusion    ceFAZolin Sodium-Dextrose (ANCEF) IVPB (duplex) 2,000 mg    metroNIDAZOLE (FLAGYL) tablet 500 mg    OR Linked Order Group     prochlorperazine (COMPAZINE) injection 5 mg     prochlorperazine (COMPAZINE) tablet 5 mg     prochlorperazine (COMPAZINE) suppository 25 mg    AND Linked Order Group     morphine injection 4 mg      naloxone (NARCAN) injection 0.4 mg          This note was electronically signed.      December 3, 2023

## 2023-12-03 NOTE — PLAN OF CARE
Problem: Adult Inpatient Plan of Care  Goal: Plan of Care Review  Outcome: Ongoing, Progressing  Goal: Patient-Specific Goal (Individualized)  Outcome: Ongoing, Progressing  Goal: Absence of Hospital-Acquired Illness or Injury  Outcome: Ongoing, Progressing  Intervention: Identify and Manage Fall Risk  Recent Flowsheet Documentation  Taken 12/3/2023 1600 by Carmita Hamilton RN  Safety Promotion/Fall Prevention:   room organization consistent   safety round/check completed   toileting scheduled  Taken 12/3/2023 1400 by Carmita Hamilton RN  Safety Promotion/Fall Prevention:   room organization consistent   safety round/check completed   toileting scheduled  Taken 12/3/2023 1210 by Carmita Hamilton RN  Safety Promotion/Fall Prevention:   room organization consistent   safety round/check completed   toileting scheduled  Taken 12/3/2023 0802 by Carmita Hamilton RN  Safety Promotion/Fall Prevention:   room organization consistent   safety round/check completed   toileting scheduled  Intervention: Prevent Skin Injury  Recent Flowsheet Documentation  Taken 12/3/2023 1600 by Carmita Hamilton RN  Body Position:   left   position changed independently   tilted  Taken 12/3/2023 1400 by Carmita Hamilton RN  Body Position:   position changed independently   sitting up in bed  Taken 12/3/2023 1006 by Carmita Hamilton RN  Body Position: position changed independently  Taken 12/3/2023 0802 by Carmita Hamilton RN  Body Position:   position changed independently   tilted   sitting up in bed  Intervention: Prevent and Manage VTE (Venous Thromboembolism) Risk  Recent Flowsheet Documentation  Taken 12/3/2023 1400 by Carmita Hamilton RN  Activity Management: activity encouraged  Taken 12/3/2023 1006 by Carmita Hamilton RN  Activity Management:   ambulated in room   up in chair  Taken 12/3/2023 0802 by Carmita Hamilton RN  VTE Prevention/Management:   sequential compression devices off   patient refused intervention  Goal: Optimal Comfort and  Wellbeing  Outcome: Ongoing, Progressing  Intervention: Monitor Pain and Promote Comfort  Recent Flowsheet Documentation  Taken 12/3/2023 0741 by Carmita Hamilton RN  Pain Management Interventions:   see MAR   position adjusted  Intervention: Provide Person-Centered Care  Recent Flowsheet Documentation  Taken 12/3/2023 0802 by Carmita Hamilton RN  Trust Relationship/Rapport:   care explained   thoughts/feelings acknowledged  Goal: Readiness for Transition of Care  Outcome: Ongoing, Progressing     Problem: Pain Acute  Goal: Acceptable Pain Control and Functional Ability  Outcome: Ongoing, Progressing  Intervention: Develop Pain Management Plan  Recent Flowsheet Documentation  Taken 12/3/2023 0741 by Carmita Hamilton RN  Pain Management Interventions:   see MAR   position adjusted     Problem: Impaired Wound Healing  Goal: Optimal Wound Healing  Outcome: Ongoing, Progressing  Intervention: Promote Wound Healing  Recent Flowsheet Documentation  Taken 12/3/2023 1400 by Carmita Hamilton RN  Activity Management: activity encouraged  Taken 12/3/2023 1006 by Carmita Hamilton RN  Activity Management:   ambulated in room   up in chair  Taken 12/3/2023 0741 by Carmita Hamilton RN  Pain Management Interventions:   see MAR   position adjusted   Goal Outcome Evaluation:         Patient remains on room air, using incentive spirometry-still complains of moderate pain but manageable with PRN meds.Patient has ambulated today and sat in recliner at bedside. States she is having some flatulence this evening.Prevena vac in place CDI.Meds given per MAR, Labs being monitored.

## 2023-12-03 NOTE — PROGRESS NOTES
LOS: 0 days   Patient Care Team:  Jaqueline Hays APRN as PCP - General (Family Medicine)  Alma Rosa Birch, RN as Nurse Navigator (Obstetrics)       Chief Complaint: POD1 diagnostic laparoscopy converted to laparotomy with ileocecectomy    HPI: Patient seen and examined at bedside. No acute events overnight. She states that she is having moderate abdominal pain and the IV medications wear off too quickly. She had some nausea with medications, but this is controlled with Zofran. She has ambulated to the door. Denies flatus or bowel movement.     Vital Signs  Temp:  [97.6 °F (36.4 °C)-99.6 °F (37.6 °C)] 99 °F (37.2 °C)  Heart Rate:  [] 96  Resp:  [12-20] 20  BP: (114-143)/(64-87) 131/73    Physical Exam:     General Appearance:  Alert and cooperative, not in any acute distress.   Respiratory/Lungs:   Breath sounds heard bilaterally equally.  No crackles or wheezing. No Pleural rub or bronchial breathing. Normal respiratory effort.    Cardiovascular/Heart:  Normal S1 and S2, no murmur. No edema   GI/Abdomen:   Obese, soft, appropriately tender to palpation, Prevena in place, laparoscopic incisions are clean, dry, and intact, active bowel sounds on auscultation, lower  incision with superficial dehiscence no evidence of infection                Musculoskeletal/ Extremities:   Moves all extremities well   Skin: No bleeding, bruising or rash, no induration   Psychiatric : Alert and oriented ×3.  No depression or anxiety    Neurologic: Cranial nerves 2 - 12 grossly intact, sensation intact, Motor power is normal and equal bilaterally.     Results Review:       Lab Results (last 24 hours)       Procedure Component Value Units Date/Time    Wound Culture - Surgical Site, Abdominal Wall [[id number]] Collected: 23 0418    Specimen: Surgical Site from Abdominal Wall Updated: 23 1003     Wound Culture No growth     Gram Stain Moderate (3+) WBCs seen      No No organisms seen    Basic  Metabolic Panel [[id number]]  (Abnormal) Collected: 12/03/23 0553    Specimen: Blood Updated: 12/03/23 0629     Glucose 143 mg/dL      BUN 10 mg/dL      Creatinine 0.75 mg/dL      Sodium 136 mmol/L      Potassium 4.2 mmol/L      Chloride 103 mmol/L      CO2 22.7 mmol/L      Calcium 8.9 mg/dL      BUN/Creatinine Ratio 13.3     Anion Gap 10.3 mmol/L      eGFR 108.0 mL/min/1.73     Narrative:      GFR Normal >60  Chronic Kidney Disease <60  Kidney Failure <15      CBC (No Diff) [[id number]]  (Abnormal) Collected: 12/03/23 0553    Specimen: Blood Updated: 12/03/23 0611     WBC 11.88 10*3/mm3      RBC 3.72 10*6/mm3      Hemoglobin 10.0 g/dL      Hematocrit 31.8 %      MCV 85.5 fL      MCH 26.9 pg      MCHC 31.4 g/dL      RDW 13.6 %      RDW-SD 43.1 fl      MPV 10.0 fL      Platelets 279 10*3/mm3     TISSUE EXAM, [facility] ([facility]) [[id number]] Collected: 12/02/23 1652    Specimen: Tissue from Large Intestine, Cecum Updated: 12/02/23 1719                Assessment & Plan       Right lower quadrant abdominal pain    Abdominal pain, right lower quadrant    Patient is POD1 diagnostic laparoscopy converted to laparotomy with ileocecectomy. Vital signs are stable. Patient complaining of moderate abdominal pain, will add Norco and okay for patient to have sips/ice chips with medications. I had a long discussion with the patient and mother at bedside regarding intraoperative findings. I drew pictures to explain the anatomy. I also discussed potential post operative course and need for weight lifting restrictions at home. They expressed understanding. I encouraged the patient to ambulate as much as possible. Okay to shower and she may also chew gum. All questions were answered.     Kelly Miranda DO  12/03/23  12:03 EST

## 2023-12-03 NOTE — PAYOR COMM NOTE
"  Inpatient notification and clinicals    Ur manager; Ctahy Rothman [phone] and fax [phone]    Lauren Carrasco (33 y.o. Female)       Date of Birth   1990    Social Security Number       Address   [address] [place] [id number]    Home Phone   [phone]    MRN   [id number]       Spiritism   None    Marital Status                               Admission Date   23    Admission Type   Emergency    Admitting Provider   Alonso Younger DO    Attending Provider   Joe Webb MD    Department, Room/Bed   [facility] TELEMETRY /       Discharge Date       Discharge Disposition       Discharge Destination                                 Attending Provider: Joe Webb MD    Allergies: No Known Allergies    Isolation: None   Infection: COVID (rule out) (23)   Code Status: CPR    Ht: 167.6 cm (66\")   Wt: 115 kg (252 lb 6.8 oz)    Admission Cmt: None   Principal Problem: Right lower quadrant abdominal pain [R10.31]                   Active Insurance as of 2023       Primary Coverage       Payor Plan Insurance Group Employer/Plan Group    [place] [id number]       Payor Plan Address Payor Plan Phone Number Payor Plan Fax Number Effective Dates    [employer] [address] [address] [phone]  3/1/2023 - None Entered    Formerly [facility] [id number]         Subscriber Name Subscriber Birth Date Member ID       LAUREN CARRASCO 1990 [id number]                     Emergency Contacts        (Rel.) Home Phone Work Phone Mobile Phone    Cal Carrasco (Spouse) -- -- [phone]    murtaza watson -- -- [phone]                 History & Physical        Joe Webb MD at 23 0929            [facility] HOSPITALIST   HISTORY AND PHYSICAL      Name:  Lauren Carrasco   Age:  33 y.o.  Sex:  female  :  1990  MRN:  [id number]   Visit Number:  " [id number]  Admission Date:  2023  Date Of Service:  23  Primary Care Physician:  Jaqueline Hays APRN    Chief Complaint:     Abdominal pain    History Of Presenting Illness:      Patient is a 33 years old female with a past medical history of  3.5 weeks who presented to the ER with a chief complaint of abdominal pain.  Patient reports that she was doing well after her  until 3 days ago when she started having abdominal discomfort in her right lower quadrant that radiated to her back, patient then started having fever yesterday of 100.5 F and her pain became worse so she decided to come to the ER for evaluation.  Patient denies any vomiting or urinary symptoms.  Patient denies changes in her bowel habits.  Patient has some remaining mild incisional drainage at the site of her . Patient confirmed that her appendix ruptured when she was 11 years old and had appendectomy.  No other abdominal surgeries.    On ER evaluation, her vitals were stable and was afebrile.  Her workup was pretty much within acceptable range except for hemoglobin of 10.0.  WBC WNL.  Urinalysis negative for infection.  CT abdomen pelvis showed findings consistent with acute appendicitis, Dilated appendix measuring up to 2.2 cm in diameter, with adjacent fat stranding and small fluid. Appendicolith at the appendix base. Heterogeneous fluid collection anterior to the uterus, which could be due to infected fluid or blood products. No evidence of active bleeding. Anterior abdominal wall  scar with no visible associated abscess.  Patient received Toradol.  Case discussed with Dr. Miranda with general surgery who recommended admission for observation, no indication for antibiotics at this time with plan on repeating CT scan for evaluation. Hospitalist consulted for admission.    Review Of Systems:    All systems were reviewed and negative except as mentioned in history of presenting illness,  assessment and plan.    Past Medical History: Patient  has a past medical history of Abnormal Pap smear of cervix, Hypertension, Pregnancy (2023), and Recurrent pregnancy loss, antepartum condition or complication (2021/2022).    Past Surgical History: Patient  has a past surgical history that includes Adenoidectomy;  section; and  section (N/A, 2023).    Social History: Patient  reports that she has never smoked. She has never used smokeless tobacco. She reports that she does not drink alcohol and does not use drugs.    Family History:  Patient's family history has been reviewed and found to be noncontributory.     Allergies:      Patient has no known allergies.    Home Medications:    Prior to Admission Medications       Prescriptions Last Dose Informant Patient Reported? Taking?    acetaminophen (TYLENOL) 500 MG tablet Past Month  No Yes    Take 2 tablets by mouth Every 8 (Eight) Hours As Needed for Mild Pain.    ferrous sulfate 325 (65 FE) MG tablet Past Week  No Yes    Take 1 tablet by mouth 2 (Two) Times a Day Before Meals.    ibuprofen (ADVIL,MOTRIN) 800 MG tablet Past Week  No Yes    Take 1 tablet by mouth Every 8 (Eight) Hours As Needed for Mild Pain.    Prenatal Vit-Fe Fumarate-FA (Prenatal Vitamin) 27-0.8 MG tablet Past Month  No Yes    Take 1 tablet by mouth Daily.    prenatal vitamin (prenatal, CLASSIC, vitamin) tablet Past Month  Yes Yes    Take  by mouth Daily.          ED Medications:    Medications   sodium chloride 0.9 % flush 10 mL (has no administration in time range)   ketorolac (TORADOL) injection 30 mg (30 mg Intravenous Given 23 9568)   iopamidol (ISOVUE-300) 61 % injection 100 mL (100 mL Intravenous Given 23 3031)     Vital Signs:  Temp:  [98.3 °F (36.8 °C)-98.4 °F (36.9 °C)] 98.3 °F (36.8 °C)  Heart Rate:  [80-97] 80  Resp:  [18] 18  BP: (114-140)/(61-74) 124/67        23  0232 23  0656   Weight: 117 kg (257 lb) 115 kg (252 lb 6.8  "oz)     Body mass index is 40.74 kg/m².    Physical Exam:     Most recent vital Signs: /67 (BP Location: Left arm, Patient Position: Lying)   Pulse 80   Temp 98.3 °F (36.8 °C) (Oral)   Resp 18   Ht 167.6 cm (66\")   Wt 115 kg (252 lb 6.8 oz)   LMP  (LMP Unknown)   SpO2 97%   Breastfeeding No   BMI 40.74 kg/m²     Physical Exam  Vitals and nursing note reviewed.   Constitutional:       General: She is not in acute distress.     Appearance: Normal appearance.   HENT:      Head: Normocephalic and atraumatic.      Right Ear: External ear normal.      Left Ear: External ear normal.      Nose: Nose normal.      Mouth/Throat:      Mouth: Mucous membranes are moist.   Eyes:      Extraocular Movements: Extraocular movements intact.      Conjunctiva/sclera: Conjunctivae normal.      Pupils: Pupils are equal, round, and reactive to light.   Cardiovascular:      Rate and Rhythm: Normal rate and regular rhythm.      Pulses: Normal pulses.      Heart sounds: Normal heart sounds.   Pulmonary:      Effort: Pulmonary effort is normal. No respiratory distress.      Breath sounds: Normal breath sounds. No wheezing or rhonchi.   Abdominal:      General: Bowel sounds are normal. There is no distension.      Palpations: Abdomen is soft.      Tenderness: There is abdominal tenderness in the right lower quadrant. There is no guarding or rebound.   Musculoskeletal:         General: Normal range of motion.      Cervical back: Normal range of motion and neck supple.      Right lower leg: No edema.      Left lower leg: No edema.   Skin:     General: Skin is warm and dry.      Findings: No rash.      Comments:  surgical site appears well-healed except for 2 minor openings with minimal serous drainage.  No wound dehiscence at the  incision noted. no deb-incisional fluctuance or abscess formation.   Neurological:      General: No focal deficit present.      Mental Status: She is alert and oriented to person, " place, and time. Mental status is at baseline.      Motor: No weakness.   Psychiatric:         Mood and Affect: Mood normal.         Behavior: Behavior normal.         Thought Content: Thought content normal.         Laboratory data:    I have reviewed the labs done in the emergency room.    Results from last 7 days   Lab Units 12/02/23  0308   SODIUM mmol/L 141   POTASSIUM mmol/L 3.8   CHLORIDE mmol/L 105   CO2 mmol/L 24.8   BUN mg/dL 14   CREATININE mg/dL 0.87   CALCIUM mg/dL 9.3   BILIRUBIN mg/dL 0.4   ALK PHOS U/L 104   ALT (SGPT) U/L 23   AST (SGOT) U/L 16   GLUCOSE mg/dL 98     Results from last 7 days   Lab Units 12/02/23  0308   WBC 10*3/mm3 8.89   HEMOGLOBIN g/dL 10.0*   HEMATOCRIT % 31.3*   PLATELETS 10*3/mm3 259         Results from last 7 days   Lab Units 12/02/23  0308   HSTROP T ng/L <6             Results from last 7 days   Lab Units 12/02/23  0308   LIPASE U/L 13         Results from last 7 days   Lab Units 12/02/23  0303   COLOR UA  Yellow   GLUCOSE UA  Negative   KETONES UA  Negative   BLOOD UA  Trace*   LEUKOCYTES UA  Trace*   PH, URINE  5.5   BILIRUBIN UA  Negative   UROBILINOGEN UA  0.2 E.U./dL   RBC UA /HPF None Seen   WBC UA /HPF 0-2       Pain Management Panel          Latest Ref Rng & Units 5/2/2023   Pain Management Panel   Creatinine, Urine 20.0 - 300.0 mg/dL 125.6    Amphetamine, Urine Qual Qqgjqf=1128 ng/mL Negative    Barbiturates Screen, Urine Irpewg=595 ng/mL Negative    Benzodiazepine Screen, Urine Bsjcsd=123 ng/mL Negative    Cocaine Screen, Urine Ikcssp=023 ng/mL Negative    Methadone Screen , Urine Tfqzqo=336 ng/mL Negative        EKG:      Sinus rhythm, heart rate 85, nonspecific ST/T wave changes.    Radiology:    CT Abdomen Pelvis With Contrast    Addendum Date: 12/2/2023    ADDENDUM REPORT ADDENDUM: This report was discussed with DR. ZIYAD MONTGOMERY on Dec 02, 2023 04:52:00 EST. Authenticated and Electronically Signed by Lazaro Rich MD on 12/02/2023 04:53:13 AM    Result Date:  2023  FINAL REPORT TECHNIQUE: null CLINICAL HISTORY: 3 weeks post c section, fever, incision drainage COMPARISON: null FINDINGS: CT Abdomen and Pelvis W Contrast COMPARISON: CT - ABDOMEN/PELVIS W/O CONTRAST - 2015 04:36 PM EDT FINDINGS: Normal liver. Splenomegaly. Left renal cyst. Unremarkable right kidney. No hydronephrosis. Normal adrenal glands. Normal pancreas. No visible gallstones. No biliary dilatation. No evidence of bowel obstruction or colitis. Dilated appendix measuring up to 2.2 cm in diameter, with adjacent fat stranding and small fluid. Appendicolith at the appendix base. Unremarkable bladder.  scar in the anterior uterus. There is a small heterogeneous fluid collection anterior to the uterus (for example series 4, image 82). No contrast blush. No pneumoperitoneum. No lymphadenopathy. No acute fracture. No abdominal aortic aneurysm. Small fat containing umbilical hernia. Anterior abdominal wall  scar with no visible associated abscess.     IMPRESSION: Findings consistent with acute appendicitis. Heterogeneous fluid collection anterior to the uterus, which could be due to infected fluid or blood products. No evidence of active bleeding. Nonemergent/incidental findings in the report. Authenticated and Electronically Signed by Lazaro Rich MD on 2023 04:48:29 AM     Assessment:    Possible appendicitis, POA  Status post   Right lower quadrant abdominal pain, POA    Plan:    Patient is admitted to [facility] for further management and treatment.    Possible appendicitis  -Patient reports appendectomy when she was 11 years old  -Dr. Miranda with general surgery consulted, appreciate recommendations.  -Continue IV fluids, antiemetics and pain control PRN  -No leukocytosis, afebrile, no indication for antibiotics at this time  -Wound cultures obtained from  site, will follow  -Plan on repeating CT scan per surgery recommendations  -N.p.o. until evaluation by  surgery    Further orders as indicated per clinical course.    Risk Assessment: Moderate  DVT Prophylaxis: SCDs, ambulatory  Code Status: Full  Diet: N.p.o.    Advance Care Planning  ACP discussion was held with the patient during this visit. Patient does not have an advance directive, information provided.       Joe Webb MD  23  09:29 EST    Dictated utilizing Dragon dictation.    Electronically signed by Joe Webb MD at 23 0962          Emergency Department Notes        Derrick Cedeno,  at 23 0622          Subjective  History of Present Illness:    Chief Complaint: Abdominal pain    History of Present Illness: 33-year-old female presents with abdominal pain, fever 100.5 °F, at home tonight.  3-1/2 weeks status post .  Has had some incisional drainage since her procedure, no vomiting no urinary symptoms  Nurses Notes reviewed and agree, including vitals, allergies, social history and prior medical history.     REVIEW OF SYSTEMS: All systems reviewed and not pertinent unless noted.  Review of Systems      Positive for: Abdominal pain, fever,    Negative for: Flank pain urinary symptoms vomiting diarrhea    Past Medical History:   Diagnosis Date    Abnormal Pap smear of cervix     Hypertension     Pregnancy 2023    Recurrent pregnancy loss, antepartum condition or complication 2021/2022       Allergies:    Patient has no known allergies.      Past Surgical History:   Procedure Laterality Date    ADENOIDECTOMY       SECTION       SECTION N/A 2023    Procedure:  SECTION REPEAT;  Surgeon: Bri Jimenez MD;  Location: [facility];  Service: Obstetrics/Gynecology;  Laterality: N/A;         Social History     Socioeconomic History    Marital status:     Number of children: 1    Highest education level: High school graduate   Tobacco Use    Smoking status: Never    Smokeless tobacco: Never   Vaping Use    Vaping Use: Never used  "  Substance and Sexual Activity    Alcohol use: No    Drug use: No    Sexual activity: Yes     Partners: Male     Birth control/protection: None         Family History   Problem Relation Age of Onset    Hypertension Father     Stroke Father     Hyperlipidemia Mother     Stroke Mother         Blood clots       Objective  Physical Exam:  /71 (BP Location: Left arm, Patient Position: Sitting)   Pulse 82   Temp 98.4 °F (36.9 °C) (Oral)   Resp 18   Ht 167.6 cm (66\")   Wt 117 kg (257 lb)   LMP  (LMP Unknown)   SpO2 97%   Breastfeeding No   BMI 41.48 kg/m²      Physical Exam    CONSTITUTIONAL: Well developed, healthy appearing nontoxic 33-year-old female,  in mild discomfort.  VITAL SIGNS: per nursing, reviewed and noted  SKIN: exposed skin with no wound dehiscence to the  incision.   no deb-incisional fluctuance   EYES: Grossly EOMI, no icterus  ENT: Normal voice.  Moist mucous membranes   RESPIRATORY:  No increased work of breathing. No retractions.   CARDIOVASCULAR:   Extremities pink and warm.  Good cap refill to extremities.   GI: Abdomen without distention Diffuse lower abdominal tenderness palpation,  MUSCULOSKELETAL: Age-appropriate bulk and tone, moves all 4 extremities  NEUROLOGIC: Alert, oriented x 3. No gross deficits. GCS 15.   PSYCH: appropriate affect.  : no bladder tenderness or distention, no CVA tenderness    Procedures    ED Course:    Lab Results (last 24 hours)       Procedure Component Value Units Date/Time    Urinalysis With Microscopic If Indicated (No Culture) - Urine, Clean Catch [[id number]]  (Abnormal) Collected: 23 0303    Specimen: Urine, Clean Catch Updated: 23 033     Color, UA Yellow     Appearance, UA Clear     pH, UA 5.5     Specific Gravity, UA 1.015     Glucose, UA Negative     Ketones, UA Negative     Bilirubin, UA Negative     Blood, UA Trace     Protein, UA Negative     Leuk Esterase, UA Trace     Nitrite, UA Negative     Urobilinogen, UA 0.2 " E.U./dL    Urinalysis, Microscopic Only - Urine, Clean Catch [[id number]]  (Abnormal) Collected: 12/02/23 0303    Specimen: Urine, Clean Catch Updated: 12/02/23 0352     RBC, UA None Seen /HPF      WBC, UA 0-2 /HPF      Bacteria, UA Trace /HPF      Squamous Epithelial Cells, UA 0-2 /HPF      Hyaline Casts, UA None Seen /LPF      Methodology Manual Light Microscopy    CBC & Differential [[id number]]  (Abnormal) Collected: 12/02/23 0308    Specimen: Blood Updated: 12/02/23 0315    Narrative:      The following orders were created for panel order CBC & Differential.  Procedure                               Abnormality         Status                     ---------                               -----------         ------                     CBC Auto Differential[[id number]]        Abnormal            Final result                 Please view results for these tests on the individual orders.    Comprehensive Metabolic Panel [[id number]] Collected: 12/02/23 0308    Specimen: Blood Updated: 12/02/23 0407     Glucose 98 mg/dL      BUN 14 mg/dL      Creatinine 0.87 mg/dL      Sodium 141 mmol/L      Potassium 3.8 mmol/L      Chloride 105 mmol/L      CO2 24.8 mmol/L      Calcium 9.3 mg/dL      Total Protein 7.0 g/dL      Albumin 4.0 g/dL      ALT (SGPT) 23 U/L      AST (SGOT) 16 U/L      Alkaline Phosphatase 104 U/L      Total Bilirubin 0.4 mg/dL      Globulin 3.0 gm/dL      A/G Ratio 1.3 g/dL      BUN/Creatinine Ratio 16.1     Anion Gap 11.2 mmol/L      eGFR 90.3 mL/min/1.73     Narrative:      GFR Normal >60  Chronic Kidney Disease <60  Kidney Failure <15      Lipase [[id number]]  (Normal) Collected: 12/02/23 0308    Specimen: Blood Updated: 12/02/23 0359     Lipase 13 U/L     Single High Sensitivity Troponin T [[id number]]  (Normal) Collected: 12/02/23 0308    Specimen: Blood Updated: 12/02/23 0401     HS Troponin T <6 ng/L     Narrative:      High Sensitive Troponin T Reference Range:  <14.0 ng/L- Negative Female for AMI  <22.0  ng/L- Negative Male for AMI  >=14 - Abnormal Female indicating possible myocardial injury.  >=22 - Abnormal Male indicating possible myocardial injury.   Clinicians would have to utilize clinical acumen, EKG, Troponin, and serial changes to determine if it is an Acute Myocardial Infarction or myocardial injury due to an underlying chronic condition.         CBC Auto Differential [[id number]]  (Abnormal) Collected: 12/02/23 0308    Specimen: Blood Updated: 12/02/23 0315     WBC 8.89 10*3/mm3      RBC 3.71 10*6/mm3      Hemoglobin 10.0 g/dL      Hematocrit 31.3 %      MCV 84.4 fL      MCH 27.0 pg      MCHC 31.9 g/dL      RDW 13.6 %      RDW-SD 42.1 fl      MPV 9.8 fL      Platelets 259 10*3/mm3      Neutrophil % 75.4 %      Lymphocyte % 15.0 %      Monocyte % 7.6 %      Eosinophil % 0.9 %      Basophil % 0.3 %      Immature Grans % 0.8 %      Neutrophils, Absolute 6.70 10*3/mm3      Lymphocytes, Absolute 1.33 10*3/mm3      Monocytes, Absolute 0.68 10*3/mm3      Eosinophils, Absolute 0.08 10*3/mm3      Basophils, Absolute 0.03 10*3/mm3      Immature Grans, Absolute 0.07 10*3/mm3      nRBC 0.0 /100 WBC     COVID-19 and FLU A/B PCR, 1 HR TAT - Swab, Nasopharynx [[id number]]  (Normal) Collected: 12/02/23 0417    Specimen: Swab from Nasopharynx Updated: 12/02/23 0449     COVID19 Not Detected     Influenza A PCR Not Detected     Influenza B PCR Not Detected    Narrative:      Fact sheet for providers: https://www.fda.gov/media/289003/download    Fact sheet for patients: https://www.fda.gov/media/069601/download    Test performed by PCR.    Wound Culture - Surgical Site, Abdominal Wall [[id number]] Collected: 12/02/23 0418    Specimen: Surgical Site from Abdominal Wall Updated: 12/02/23 0512     Gram Stain Moderate (3+) WBCs seen      No No organisms seen             CT Abdomen Pelvis With Contrast    Addendum Date: 12/2/2023    ADDENDUM REPORT ADDENDUM: This report was discussed with DR. ZIYAD MONTGOMERY on Dec 02, 2023 04:52:00  EST. Authenticated and Electronically Signed by Lazaro Rich MD on 2023 04:53:13 AM    Result Date: 2023  FINAL REPORT TECHNIQUE: null CLINICAL HISTORY: 3 weeks post c section, fever, incision drainage COMPARISON: null FINDINGS: CT Abdomen and Pelvis W Contrast COMPARISON: CT - ABDOMEN/PELVIS W/O CONTRAST - 2015 04:36 PM EDT FINDINGS: Normal liver. Splenomegaly. Left renal cyst. Unremarkable right kidney. No hydronephrosis. Normal adrenal glands. Normal pancreas. No visible gallstones. No biliary dilatation. No evidence of bowel obstruction or colitis. Dilated appendix measuring up to 2.2 cm in diameter, with adjacent fat stranding and small fluid. Appendicolith at the appendix base. Unremarkable bladder.  scar in the anterior uterus. There is a small heterogeneous fluid collection anterior to the uterus (for example series 4, image 82). No contrast blush. No pneumoperitoneum. No lymphadenopathy. No acute fracture. No abdominal aortic aneurysm. Small fat containing umbilical hernia. Anterior abdominal wall  scar with no visible associated abscess.     Impression: IMPRESSION: Findings consistent with acute appendicitis. Heterogeneous fluid collection anterior to the uterus, which could be due to infected fluid or blood products. No evidence of active bleeding. Nonemergent/incidental findings in the report. Authenticated and Electronically Signed by Lazaro Rich MD on 2023 04:48:29 AM      MDM     Amount and/or Complexity of Data Reviewed  Clinical lab tests: reviewed  Tests in the radiology section of CPT®: reviewed  Tests in the medicine section of CPT®: reviewed        ED Course as of 23 0622   Sat Dec 02, 2023   0319 EKG interpreted by me reveals sinus rhythm 85 no ectopy no ischemic changes [PF]      ED Course User Index  [PF] Derrick Cedeno, DO       Medical Decision Making:    Initial impression of presenting illness:   DDX: includes but is not limited to:  33-year-old female presents with abdominal pain, fever 100.5 °F, at home tonight.  3-1/2 weeks status post .  Has had some incisional drainage since her procedure, no vomiting no urinary symptoms         Patient arrives normotensive afebrile nontachycardic sats 97% with vitals interpreted by myself.     Pertinent features from physical exam: Evidence of minimal serous drainage at the margin of the surgical incision without dehiscence  incision.  Diffuse lower abdominal tenderness.  Serial abdominal examinations with localization of pain to the right lower quadrant.    Initial diagnostic plan: CBC CMP flu covid UA, added Gram stain and culture to suspected seroma site.  Added CT abdomen pelvis to rule out fluid collection/abscess    Results from initial plan were reviewed and interpreted by me revealing nonactionable flu covid, CBC CMP UA.  CT abdomen pelvis with Dilated appendix measuring up to 2.2 cm in diameter, with adjacent fat stranding and small fluid. Appendicolith at the appendix base.       Diagnostic information from other sources: Old records reviewed    Interventions / Re-evaluation: Toradol on arrival    Results/clinical rationale were discussed with patient  Who advised she had a ruptured appendicitis at the age of 11.    Consultations/Discussion of results with other physicians: Dr. Miranda surgeon, recommended observation, plan to repeat CT abdomen pelvis with contrast in 12 to 24 hours.  Withhold antibiotics at this time.    Discussed with Dr. Caro hospitalist service will admit    Disposition plan: Admission for further care  -----      Final diagnoses:   Right lower quadrant abdominal pain            Derrick Cedeno DO  23      Electronically signed by Derrick Cedeno DO at 23       Current Facility-Administered Medications   Medication Dose Route Frequency Provider Last Rate Last Admin    acetaminophen (TYLENOL) tablet 650 mg  650 mg Oral Q4H PRN Ruth  Kelly B, DO   650 mg at 12/03/23 0741    Or    acetaminophen (TYLENOL) 160 MG/5ML oral solution 650 mg  650 mg Oral Q4H PRN Ruth, Kelly B, DO        Or    acetaminophen (TYLENOL) suppository 650 mg  650 mg Rectal Q4H PRN Ruth, Kelly B, DO        sennosides-docusate (PERICOLACE) 8.6-50 MG per tablet 2 tablet  2 tablet Oral BID Ruth, Kelly B, DO        And    polyethylene glycol (MIRALAX) packet 17 g  17 g Oral Daily PRN Ruth, Kelly B, DO        And    bisacodyl (DULCOLAX) EC tablet 5 mg  5 mg Oral Daily PRN Ruth, Kelly B, DO        And    bisacodyl (DULCOLAX) suppository 10 mg  10 mg Rectal Daily PRN Ruth, Kelly B, DO        ceFAZolin Sodium-Dextrose (ANCEF) IVPB (duplex) 2,000 mg  2,000 mg Intravenous Q8H Ruth, Kelly B, DO   2,000 mg at 12/03/23 0957    lactated ringers infusion  100 mL/hr Intravenous Continuous Ruth, Kelly B,  mL/hr at 12/02/23 1834 100 mL/hr at 12/02/23 1834    Magnesium Standard Dose Replacement - Follow Nurse / BPA Driven Protocol   Does not apply PRN Ruth, Kelly B, DO        melatonin tablet 5 mg  5 mg Oral Nightly PRN Ruth, Kelly B, DO        metroNIDAZOLE (FLAGYL) tablet 500 mg  500 mg Oral Q8H Ruth, Kelly B, DO   500 mg at 12/03/23 0515    morphine injection 4 mg  4 mg Intravenous Q4H PRN Alonso Younger DO   4 mg at 12/03/23 0910    And    naloxone (NARCAN) injection 0.4 mg  0.4 mg Intravenous Q5 Min PRN Alonso Younger DO        ondansetron (ZOFRAN) injection 4 mg  4 mg Intravenous Q6H PRN Ruth, Kelly B, DO   4 mg at 12/03/23 0918    Potassium Replacement - Follow Nurse / BPA Driven Protocol   Does not apply PRN Ruth, Kelly B, DO        prochlorperazine (COMPAZINE) injection 5 mg  5 mg Intravenous Q6H PRN Alonso Yuonger DO   5 mg at 12/03/23 0515    Or    prochlorperazine (COMPAZINE) tablet 5 mg  5 mg Oral Q6H PRN Alonso Younger DO        Or    prochlorperazine (COMPAZINE) suppository 25 mg  25 mg Rectal Q12H PRN Aren,  DO Alonso        sodium chloride 0.9 % flush 10 mL  10 mL Intravenous PRN Kelly Miranda DO         Physician Progress Notes (last 48 hours)  Notes from 23 1051 through 23 1051   No notes of this type exist for this encounter.          Consult Notes (last 48 hours)        Amrit Otero MD at 23 0630        Consult Orders    1. Inpatient Obstetrics / Gynecology Consult [[id number]] ordered by Joe Webb MD at 23 1736                 Subjective   Chief Complaint   Patient presents with    Abdominal Pain     Lauren Carrasco is a 33 y.o. year old .  No LMP recorded (lmp unknown).  She presents to be seen because of request of general surgery secondary to wound separation.  Patient had  .  Op report notes extensive lysis of adhesions predominantly left-sided.  She is having recurrent fevers presented at which time stated appendicitis was noted at the ED on CT.  Underwent diagnostic laparoscopy with conversion to laparotomy-see operative report-appendix never seen per general surgery.  Per general surgery wound dehiscence noted.  Per ED note there was may be minimal drainage but incision was intact and healed so unsure exactly when this happened.    OTHER COMPLAINTS:  Nothing else    The following portions of the patient's history were reviewed and updated as appropriate:She  has a past medical history of Abnormal Pap smear of cervix, Hypertension, Pregnancy (2023), and Recurrent pregnancy loss, antepartum condition or complication (2021/2022).  She does not have any pertinent problems on file.  She  has a past surgical history that includes Adenoidectomy;  section; and  section (N/A, 2023).  Her family history includes Hyperlipidemia in her mother; Hypertension in her father; Stroke in her father and mother.  She  reports that she has never smoked. She has never used smokeless tobacco. She reports that  she does not drink alcohol and does not use drugs.  Current Facility-Administered Medications   Medication Dose Route Frequency Provider Last Rate Last Admin    acetaminophen (TYLENOL) tablet 650 mg  650 mg Oral Q4H PRN Ruth, Kelly B, DO   650 mg at 12/02/23 2006    Or    acetaminophen (TYLENOL) 160 MG/5ML oral solution 650 mg  650 mg Oral Q4H PRN Ruth, Kelly B, DO        Or    acetaminophen (TYLENOL) suppository 650 mg  650 mg Rectal Q4H PRN Ruth, Kelly B, DO        sennosides-docusate (PERICOLACE) 8.6-50 MG per tablet 2 tablet  2 tablet Oral BID Ruth, Kelly B, DO        And    polyethylene glycol (MIRALAX) packet 17 g  17 g Oral Daily PRN Ruth, Kelly B, DO        And    bisacodyl (DULCOLAX) EC tablet 5 mg  5 mg Oral Daily PRN Ruth, Kelly B, DO        And    bisacodyl (DULCOLAX) suppository 10 mg  10 mg Rectal Daily PRN Ruth, Kelly B, DO        ceFAZolin Sodium-Dextrose (ANCEF) IVPB (duplex) 2,000 mg  2,000 mg Intravenous Q8H Ruth, Kelly B, DO   2,000 mg at 12/03/23 0111    lactated ringers infusion  100 mL/hr Intravenous Continuous Ruth, Kelly B,  mL/hr at 12/02/23 1834 100 mL/hr at 12/02/23 1834    Magnesium Standard Dose Replacement - Follow Nurse / BPA Driven Protocol   Does not apply PRN Ruth, Kelly B, DO        melatonin tablet 5 mg  5 mg Oral Nightly PRN Ruth, Kelly B, DO        metroNIDAZOLE (FLAGYL) tablet 500 mg  500 mg Oral Q8H Ruth, Kelly B, DO   500 mg at 12/03/23 0515    morphine injection 4 mg  4 mg Intravenous Q4H PRN Alonso Younger DO   4 mg at 12/03/23 0515    And    naloxone (NARCAN) injection 0.4 mg  0.4 mg Intravenous Q5 Min PRN Alonso Younger DO        ondansetron (ZOFRAN) injection 4 mg  4 mg Intravenous Q6H PRN Ruth, Kelly B, DO   4 mg at 12/03/23 0203    Potassium Replacement - Follow Nurse / BPA Driven Protocol   Does not apply PRN Kelly Miradna DO        prochlorperazine (COMPAZINE) injection 5 mg  5 mg Intravenous Q6H PRN  "Alonso Younger DO   5 mg at 12/03/23 0515    Or    prochlorperazine (COMPAZINE) tablet 5 mg  5 mg Oral Q6H PRN Alonso Younger DO        Or    prochlorperazine (COMPAZINE) suppository 25 mg  25 mg Rectal Q12H PRN Alonso Younger DO        sodium chloride 0.9 % flush 10 mL  10 mL Intravenous PRN Kelly Miranda DO         No current facility-administered medications on file prior to encounter.     Current Outpatient Medications on File Prior to Encounter   Medication Sig    acetaminophen (TYLENOL) 500 MG tablet Take 2 tablets by mouth Every 8 (Eight) Hours As Needed for Mild Pain.    ferrous sulfate 325 (65 FE) MG tablet Take 1 tablet by mouth 2 (Two) Times a Day Before Meals.    ibuprofen (ADVIL,MOTRIN) 800 MG tablet Take 1 tablet by mouth Every 8 (Eight) Hours As Needed for Mild Pain.    Prenatal Vit-Fe Fumarate-FA (Prenatal Vitamin) 27-0.8 MG tablet Take 1 tablet by mouth Daily.    prenatal vitamin (prenatal, CLASSIC, vitamin) tablet Take  by mouth Daily.     She has No Known Allergies.    Social History    Tobacco Use      Smoking status: Never      Smokeless tobacco: Never    Review of Systems  Consitutional POS: nothing reported    NEG: anorexia or night sweats   Gastointestinal POS: nothing reported    NEG: bloating, change in bowel habits, melena, or reflux symptoms   Genitourinary POS: nothing reported    NEG: dysuria or hematuria   Integument POS: nothing reported    NEG: moles that are changing in size, shape, color or rashes   Breast POS: nothing reported    NEG: persistent breast lump, skin dimpling, or nipple discharge         Respiratory: negative  Cardiovascular: negative  GYN: See HPI         Objective   /64 (BP Location: Left arm, Patient Position: Lying)   Pulse 89   Temp 99.3 °F (37.4 °C) (Oral)   Resp 18   Ht 167.6 cm (66\")   Wt 115 kg (252 lb 6.8 oz)   LMP  (LMP Unknown)   SpO2 96%   Breastfeeding No   BMI 40.74 kg/m²     General:  well developed; well nourished  no acute " distress   Skin:  No suspicious lesions seen   Thyroid: normal to inspection and palpation   Lungs:  breathing is unlabored  clear to auscultation bilaterally   Heart:  regular rate and rhythm, S1, S2 normal, no murmur, click, rub or gallop   Breasts:  Not performed.   Abdomen: no hepato-splenomegaly  incision is clean, dry, intact, and without drainage  Mechanical wound dehiscence right lateral margin  scar   Pelvis: Not performed.     Psychiatric: Alert and oriented ×3, mood and affect appropriate  HEENT: Atraumatic, normocephalic, normal scleral icterus  Extremities: 2+ pulses bilaterally, no edema      Lab Review   BMP and CBC    Imaging   CT of abdomen/pelvis report       Assessment   Repeat   with noted lysis of dense adhesions in the left side-laboratory values pre and postoperatively of the  were stable and normal.  Abscess right lower quadrant unsure etiology based on surgical notes  Mechanical source wound separation from prior  site-the tissue is red healthy no evidence of infection.  Suspect this was a weakened area and with mechanical trauma has been     Plan   I would leave  site open to air avoid manipulation and continue antibiotics per general surgery though Zosyn may be entertained as well.      New Medications Ordered This Visit   Medications    sodium chloride 0.9 % flush 10 mL    ketorolac (TORADOL) injection 30 mg    iopamidol (ISOVUE-300) 61 % injection 100 mL    OR Linked Order Group     acetaminophen (TYLENOL) tablet 650 mg     acetaminophen (TYLENOL) 160 MG/5ML oral solution 650 mg     acetaminophen (TYLENOL) suppository 650 mg    AND Linked Order Group     sennosides-docusate (PERICOLACE) 8.6-50 MG per tablet 2 tablet     polyethylene glycol (MIRALAX) packet 17 g     bisacodyl (DULCOLAX) EC tablet 5 mg     bisacodyl (DULCOLAX) suppository 10 mg    ondansetron (ZOFRAN) injection 4 mg    Potassium Replacement - Follow Nurse /  BPA Driven Protocol    Magnesium Standard Dose Replacement - Follow Nurse / BPA Driven Protocol    melatonin tablet 5 mg    lactated ringers infusion    ceFAZolin Sodium-Dextrose (ANCEF) IVPB (duplex) 2,000 mg    metroNIDAZOLE (FLAGYL) tablet 500 mg    OR Linked Order Group     prochlorperazine (COMPAZINE) injection 5 mg     prochlorperazine (COMPAZINE) tablet 5 mg     prochlorperazine (COMPAZINE) suppository 25 mg    AND Linked Order Group     morphine injection 4 mg     naloxone (NARCAN) injection 0.4 mg         This note was electronically signed.      December 3, 2023        Electronically signed by Amrit Otero MD at 23 0637       Kelly Miranda DO at 23 1213        Consult Orders    1. Inpatient General Surgery Consult [[id number]] ordered by Joe Webb MD at 23 0932                     Reason for Consultation: Appendicitis      Chief complaint:  Abdominal pain    SUBJECTIVE:    History of present illness:  Patient is a 33 year old female status post  3.5 weeks ago who presented to the ED with 3 days of abdominal pain. Patient states that about 3 days ago she developed right lower quadrant abdominal pain that was sharp and got progressively worse. Last night around 1AM she took her temperature and it was 100.5 so she presented to the ED. She denies nausea, vomiting, diarrhea. She has been having normal bowel movements. She has an appetite. Patient states that her postoperative course after her  has been uneventful, but she does have some minimal drainage from her incision. Of note, patient states that she had an open appendectomy when she was 11 for a severe perforated appendicitis.     CT abdomen completed demonstrates concerns for acute appendicitis with fat stranding in the right lower quadrant and evidence of appendicoliths. She has been afebrile with stable vital signs. No leukocytosis. Overnight, she has continued to have persistent right lower  quadrant abdominal pain.    Review of Systems:    Review of Systems - General ROS: negative for - chills, fatigue, fever, hot flashes, malaise or night sweats  Psychological ROS: negative for - behavioral disorder, disorientation, hallucinations, hostility or mood swings  ENT ROS: negative for - nasal polyps, oral lesions, sinus pain, sneezing or sore throat  Breast ROS: negative for - galactorrhea or new or changing breast lumps  Respiratory ROS: negative for - hemoptysis, orthopnea, pleuritic pain, sputum changes or stridor  Cardiovascular ROS: negative for - dyspnea on exertion, edema, irregular heartbeat, murmur, orthopnea, palpitations or rapid heart rate  Gastrointestinal ROS: abdominal pain  Genito-Urinary ROS: negative for - dysuria, genital ulcers, nocturia or pelvic pain  Musculoskeletal ROS: negative for - gait disturbance or muscle pain  Neurological ROS: negative for - dizziness, gait disturbance, memory loss, numbness/tingling or seizures      Allergies:  No Known Allergies    Medications:    Current Facility-Administered Medications:     acetaminophen (TYLENOL) tablet 650 mg, 650 mg, Oral, Q4H PRN **OR** acetaminophen (TYLENOL) 160 MG/5ML oral solution 650 mg, 650 mg, Oral, Q4H PRN **OR** acetaminophen (TYLENOL) suppository 650 mg, 650 mg, Rectal, Q4H PRN, Joe Webb MD    sennosides-docusate (PERICOLACE) 8.6-50 MG per tablet 2 tablet, 2 tablet, Oral, BID **AND** polyethylene glycol (MIRALAX) packet 17 g, 17 g, Oral, Daily PRN **AND** bisacodyl (DULCOLAX) EC tablet 5 mg, 5 mg, Oral, Daily PRN **AND** bisacodyl (DULCOLAX) suppository 10 mg, 10 mg, Rectal, Daily PRN, Joe Webb MD    Magnesium Standard Dose Replacement - Follow Nurse / BPA Driven Protocol, , Does not apply, PRN, Joe Webb MD    melatonin tablet 5 mg, 5 mg, Oral, Nightly PRN, Joe Webb MD    Morphine sulfate (PF) injection 2 mg, 2 mg, Intravenous, Q4H PRN **AND** naloxone (NARCAN) injection 0.4 mg, 0.4 mg,  Intravenous, Q5 Min PRN, Joe Webb MD    ondansetron (ZOFRAN) injection 4 mg, 4 mg, Intravenous, Q6H PRN, Joe Webb MD    Potassium Replacement - Follow Nurse / BPA Driven Protocol, , Does not apply, Tracey BEE Hossam, MD    sodium chloride 0.9 % flush 10 mL, 10 mL, Intravenous, PRN, Joe Webb MD    sodium chloride 0.9 % flush 10 mL, 10 mL, Intravenous, Q12H, Joe Webb MD    sodium chloride 0.9 % flush 10 mL, 10 mL, Intravenous, PRN, Joe Webb MD    sodium chloride 0.9 % infusion 40 mL, 40 mL, Intravenous, PRNTracey Hossam, MD    sodium chloride 0.9 % infusion, 100 mL/hr, Intravenous, Continuous, Joe Webb MD, Last Rate: 100 mL/hr at 23 1001, 100 mL/hr at 23 1001    History:  Past Medical History:   Diagnosis Date    Abnormal Pap smear of cervix     Hypertension     Pregnancy 2023    Recurrent pregnancy loss, antepartum condition or complication 2021/2022       Past Surgical History:   Procedure Laterality Date    ADENOIDECTOMY       SECTION       SECTION N/A 2023    Procedure:  SECTION REPEAT;  Surgeon: Bri Jimenez MD;  Location: [facility];  Service: Obstetrics/Gynecology;  Laterality: N/A;       Family History   Problem Relation Age of Onset    Hypertension Father     Stroke Father     Hyperlipidemia Mother     Stroke Mother         Blood clots       Social History     Tobacco Use    Smoking status: Never    Smokeless tobacco: Never   Vaping Use    Vaping Use: Never used   Substance Use Topics    Alcohol use: No    Drug use: No          OBJECTIVE:    Vital Signs   Temp:  [97.9 °F (36.6 °C)-98.4 °F (36.9 °C)] 97.9 °F (36.6 °C)  Heart Rate:  [70-97] 70  Resp:  [18] 18  BP: (114-140)/(46-74) 116/46    Physical Exam:     General Appearance:    Alert, cooperative, in no acute distress   Head:    Normocephalic, without obvious abnormality, atraumatic   Eyes:            Lids and lashes normal, conjunctivae and  sclerae normal, no   icterus, no pallor, corneas clear, PERRLA   Throat:   No oral lesions, no thrush, oral mucosa moist   Neck:   No adenopathy, supple, trachea midline, no thyromegaly, no   carotid bruit, no JVD   Lungs:     Clear to auscultation,respirations regular, even and                  unlabored    Heart:    Regular rhythm and normal rate, normal S1 and S2, no            murmur, no gallop, no rub, no click   Chest Wall:    No abnormalities observed   Abdomen:    Obese, soft, tender to palpation in right lower quadrant, evidence of right lower quadrant incision from previous open appendectomy,  incision has some small areas of dehiscence draining serous fluid, no evidence of infection   Extremities:   Moves all extremities well, no edema, no cyanosis, no             redness   Skin:   No bleeding, bruising or rash   Neurologic:   Cranial nerves 2 - 12 grossly intact, sensation intact, DTR       present and equal bilaterally       Results Review:   I reviewed the patient's new clinical results.  I reviewed the patient's new imaging results and agree with the interpretation.    I did personally review the patient's CT abdomen and agree there is fat stranding in the right lower quadrant concerning for appendicitis with associated appendicoliths versus surgical staples.    ASSESSMENT/PLAN:    Right lower quadrant pain  Suspicion for acute appendicitis    Patient is a healthy 33 year old female 3 weeks status post  presenting with physical exam and imaging findings concerning for acute appendicitis. Patient has a remote history of an open appendectomy for severe perforated appendicitis and it is possible that the appendix wasn't removed entirely if there was significant inflammation/perforation. This was discussed with the patient and her mother at bedside. There is certainly evidence of fat stranding in the right lower quadrant, therefore, I think it reasonable to proceed with a diagnostic  laparoscopy. If there is evidence of acute appendicitis will proceed with appendectomy. I also discussed the possibility of lysis of adhesions secondary to the open surgery she had previously. The procedure and risks including bleeding, infection, damage to surrounding structures, and need for additional surgeries was discussed. They express understanding and all questions were answered.     I discussed the patients findings and my recommendations with patient and consulting provider.        Kelly Miranda DO  12/02/23              Electronically signed by Kelly Miranda DO at 12/02/23 3202

## 2023-12-03 NOTE — PROGRESS NOTES
[facility]    Name:  Lauren Carrasco   Age:  33 y.o.  Sex:  female  :  1990  MRN:  [id number]   Visit Number:  [id number]  Admission Date:  2023  Date Of Service:  23  Primary Care Physician:  Jaqueline Hays APRN     LOS: 0 days :    Chief Complaint:      Abdominal pain     Subjective:    Patient was seen and examined at bedside today.  Mother at bedside.  Patient sitting up comfortably in the chair today and has been ambulatory.  Patient reports mild soreness in her abdomen as expected postsurgically, her pain is usually controlled with pain medicine.  She denies any acute complaints today.  Vitals are stable and afebrile.  Patient has not passed flatus and no bowel movement yet.  Patient denies any vomiting and reports nausea is only associated after she takes pain medicine.  Otherwise she is not nauseous.    Hospital Course:    Patient is a 33 years old female with a past medical history of  3.5 weeks who presented to the ER with a chief complaint of abdominal pain.  Patient reports that she was doing well after her  until 3 days ago when she started having abdominal discomfort in her right lower quadrant that radiated to her back, patient then started having fever yesterday of 100.5 F and her pain became worse so she decided to come to the ER for evaluation.  Patient denies any vomiting or urinary symptoms.  Patient denies changes in her bowel habits.  Patient has some remaining mild incisional drainage at the site of her . Patient confirmed that her appendix ruptured when she was 11 years old and had appendectomy.  No other abdominal surgeries.     On ER evaluation, her vitals were stable and was afebrile.  Her workup was pretty much within acceptable range except for hemoglobin of 10.0.  WBC WNL.  Urinalysis negative for infection.  CT abdomen pelvis showed findings consistent with acute  appendicitis, Dilated appendix measuring up to 2.2 cm in diameter, with adjacent fat stranding and small fluid. Appendicolith at the appendix base. Heterogeneous fluid collection anterior to the uterus, which could be due to infected fluid or blood products. No evidence of active bleeding. Anterior abdominal wall  scar with no visible associated abscess.  Patient received Toradol.  Case discussed with Dr. Miranda with general surgery who recommended admission for observation, no indication for antibiotics at this time with plan on repeating CT scan for evaluation. Hospitalist consulted for admission.    Review of Systems:     All systems were reviewed and negative except as mentioned in subjective, assessment and plan.    Vital Signs:    Temp:  [97.6 °F (36.4 °C)-99.6 °F (37.6 °C)] 98.5 °F (36.9 °C)  Heart Rate:  [] 85  Resp:  [12-18] 18  BP: (114-143)/(46-87) 124/70    Intake and output:    I/O last 3 completed shifts:  In: 2250 [I.V.:2200; IV Piggyback:50]  Out: 770 [Urine:720; Blood:50]  I/O this shift:  In: -   Out: 850 [Urine:850]    Physical Examination:    General Appearance:  Alert and cooperative.  Obese.  No acute distress.   Head:  Atraumatic and normocephalic.   Eyes: Conjunctivae and sclerae normal, no icterus. No pallor.   Throat: No oral lesions, no thrush, oral mucosa moist.   Neck: Supple, trachea midline, no thyromegaly.   Lungs:   Breath sounds heard bilaterally equally.  No wheezing or crackles. No Pleural rub or bronchial breathing.   Heart:  Normal S1 and S2, no murmur, no gallop, no rub. No JVD.   Abdomen:   Normal bowel sounds, no masses, no organomegaly. Soft, as expected tenderness postop, mildly distended.  Wound VAC with dry dressing. Laparoscopic incisions clean with no signs of infection. lower  incision no evidence of infection or tenderness.   Extremities: Supple, no edema, no cyanosis, no clubbing.   Skin: No bleeding or rash.   Neurologic: Alert and oriented x  "3. No facial asymmetry. Moves all four limbs. No tremors.      Laboratory results:    Results from last 7 days   Lab Units 12/03/23  0553 12/02/23  0308   SODIUM mmol/L 136 141   POTASSIUM mmol/L 4.2 3.8   CHLORIDE mmol/L 103 105   CO2 mmol/L 22.7 24.8   BUN mg/dL 10 14   CREATININE mg/dL 0.75 0.87   CALCIUM mg/dL 8.9 9.3   BILIRUBIN mg/dL  --  0.4   ALK PHOS U/L  --  104   ALT (SGPT) U/L  --  23   AST (SGOT) U/L  --  16   GLUCOSE mg/dL 143* 98     Results from last 7 days   Lab Units 12/03/23  0553 12/02/23  0308   WBC 10*3/mm3 11.88* 8.89   HEMOGLOBIN g/dL 10.0* 10.0*   HEMATOCRIT % 31.8* 31.3*   PLATELETS 10*3/mm3 279 259         Results from last 7 days   Lab Units 12/02/23  0308   HSTROP T ng/L <6     Results from last 7 days   Lab Units 12/02/23  0418   WOUNDCX  No growth     No results for input(s): \"PHART\", \"EDO0USW\", \"PO2ART\", \"PHW0SNS\", \"BASEEXCESS\" in the last 8760 hours.   I have reviewed the patient's laboratory results.    Radiology results:    Peripheral Block    Result Date: 12/2/2023  Ash Tariq CRNA     12/2/2023  4:58 PM Peripheral Block Pre-sedation assessment completed: 12/2/2023 4:52 PM Patient reassessed immediately prior to procedure Patient location during procedure: OR Start time: 12/2/2023 4:53 PM Stop time: 12/2/2023 4:56 PM Reason for block: at surgeon's request and post-op pain management Performed by PETRONA/CAA: Ash Tariq CRNA Preanesthetic Checklist Completed: patient identified, IV checked, site marked, risks and benefits discussed, surgical consent, monitors and equipment checked, pre-op evaluation and timeout performed Prep: Pt Position: supine Sterile barriers:gloves, cap, sterile barriers and mask Prep: ChloraPrep Patient monitoring: blood pressure monitoring, continuous pulse oximetry and EKG Procedure Sedation: yes Performed under: general Guidance:ultrasound guided ULTRASOUND INTERPRETATION.  Using ultrasound guidance a 20 G gauge needle was placed in close " proximity to the nerve, at which point, under ultrasound guidance anesthetic was injected in the area of the nerve and spread of the anesthesia was seen on ultrasound in close proximity thereto.  There were no abnormalities seen on ultrasound; a digital image was taken; and the patient tolerated the procedure with no complications. Images:still images obtained Laterality:Bilateral Block Type:TAP Injection Technique:single-shot Needle Type:echogenic Needle Gauge:20 G Resistance on Injection: none Medications Used: ropivacaine (NAROPIN) injection 0.5 % - Peripheral Nerve  30 mL - 12/2/2023 4:53:00 PM Medications Preservative Free Saline:30ml Comment:Block Injection: LA dose divided between Right and Left Block Adjuncts per total volume If required, intravenous sedation was given -- see meds on anesthesia record. Post Assessment Injection Assessment: negative aspiration for heme, no paresthesia on injection and incremental injection Patient Tolerance:comfortable throughout block Complications:no Additional Notes Procedure:      BILATERAL TAP BLOCKS                          Patient analgesia was achieved with General Anesthesia The pt was placed in the Supine Position and under Ultrasound guidance, an echogenic or touhy needle was advanced with Normal Saline hydro dissection of tissue.  The Internal Oblique and Transversus Abdominus muscles were visualized.  At or before the aponeurosis of Internal Oblique, the local anesthetic spread was visualized in the Transversus Abdominus Plane. Injection was made incrementally with aspiration every 5 mls.  There was no intravascular injection;  injection pressure was normal; there was no neural injection; and the procedure was completed without difficulty.     CT Abdomen Pelvis With Contrast    Addendum Date: 12/2/2023    ADDENDUM REPORT ADDENDUM: This report was discussed with DR. ZIYAD MONTGOMERY on Dec 02, 2023 04:52:00 EST. Authenticated and Electronically Signed by Lazaro Rich  MD on 2023 04:53:13 AM    Result Date: 2023  FINAL REPORT TECHNIQUE: null CLINICAL HISTORY: 3 weeks post c section, fever, incision drainage COMPARISON: null FINDINGS: CT Abdomen and Pelvis W Contrast COMPARISON: CT - ABDOMEN/PELVIS W/O CONTRAST - 2015 04:36 PM EDT FINDINGS: Normal liver. Splenomegaly. Left renal cyst. Unremarkable right kidney. No hydronephrosis. Normal adrenal glands. Normal pancreas. No visible gallstones. No biliary dilatation. No evidence of bowel obstruction or colitis. Dilated appendix measuring up to 2.2 cm in diameter, with adjacent fat stranding and small fluid. Appendicolith at the appendix base. Unremarkable bladder.  scar in the anterior uterus. There is a small heterogeneous fluid collection anterior to the uterus (for example series 4, image 82). No contrast blush. No pneumoperitoneum. No lymphadenopathy. No acute fracture. No abdominal aortic aneurysm. Small fat containing umbilical hernia. Anterior abdominal wall  scar with no visible associated abscess.     Impression: IMPRESSION: Findings consistent with acute appendicitis. Heterogeneous fluid collection anterior to the uterus, which could be due to infected fluid or blood products. No evidence of active bleeding. Nonemergent/incidental findings in the report. Authenticated and Electronically Signed by Lazaro Rich MD on 2023 04:48:29 AM   I have reviewed the patient's radiology reports.    Medication Review:     I have reviewed the patient's active and prn medications.     Problem List:      Right lower quadrant abdominal pain      Assessment:    Possible appendicitis, POA  Status post laparotomy with ileocecectomy   Status post   Right lower quadrant abdominal pain, POA    Plan:    Possible appendicitis  Status post laparotomy with ileocecectomy  -Dr. Miranda performed a diagnostic laparoscopy converted to laparotomy with ileocecectomy on .  -Pain control  -Continue IV  fluids  -Antibiotics switched from cefazolin/metronidazole to Zosyn after discussing with general surgery and OB/GYN.  -Encouraged ambulation  -Patient with no bowel movement or flatus  -Diet per surgery recommendations    Status post   -Dr. Otero consulted for possible wound dehiscence  -He thought that her  site appeared healthy with no evidence of infection   -Recommended leave  site open to air, avoid manipulation and continue antibiotics   -Will follow-up on wound cultures from  site, which has been with no growth     Further orders as indicated per clinical course.     DVT Prophylaxis: SCDs, ambulatory  Code Status: Full  Diet: N.p.o. sips with meds  Discharge Plan: Likely home in 1 to 2 days.    Joe Webb MD  23  10:08 EST    Dictated utilizing Dragon dictation.

## 2023-12-03 NOTE — PHARMACY RECOMMENDATION
Pharmacokinetic Consult - Zosyn Dosing  Lauren Carrasco is a 33 y.o. female who has been consulted to dose piperacillin/tazobactam (Zosyn) for  intra-abdominal infection .    Current Antimicrobial Therapy    Anti-Infectives (From admission, onward)      Ordered     Dose/Rate Route Frequency Start Stop    12/03/23 1422  piperacillin-tazobactam (ZOSYN) 3.375 g in iso-osmotic dextrose 50 ml (premix)        Ordering Provider: Joe Webb MD    3.375 g  over 4 Hours Intravenous Every 8 Hours 12/03/23 2200 12/08/23 2159    12/03/23 1422  piperacillin-tazobactam (ZOSYN) 3.375 g in iso-osmotic dextrose 50 ml (premix)        Ordering Provider: Joe Webb MD    3.375 g  100 mL/hr over 30 Minutes Intravenous Once 12/03/23 1515      12/03/23 1413  Pharmacy to Dose Zosyn        Ordering Provider: Joe Webb MD     Does not apply Continuous PRN 12/03/23 1413 12/08/23 1412            Microbiology Results (last 10 days)       Procedure Component Value - Date/Time    Wound Culture - Surgical Site, Abdominal Wall [[id number]] Collected: 12/02/23 0418    Lab Status: Preliminary result Specimen: Surgical Site from Abdominal Wall Updated: 12/03/23 1003     Wound Culture No growth     Gram Stain Moderate (3+) WBCs seen      No No organisms seen    COVID-19 and FLU A/B PCR, 1 HR TAT - Swab, Nasopharynx [[id number]]  (Normal) Collected: 12/02/23 0417    Lab Status: Final result Specimen: Swab from Nasopharynx Updated: 12/02/23 0449     COVID19 Not Detected     Influenza A PCR Not Detected     Influenza B PCR Not Detected    Narrative:      Fact sheet for providers: https://www.fda.gov/media/481864/download    Fact sheet for patients: https://www.fda.gov/media/403834/download    Test performed by PCR.             Allergies  Patient has no known allergies.    Relevant clinical data and objective history reviewed:  Creatinine   Date Value Ref Range Status   12/03/2023 0.75 0.57 - 1.00 mg/dL Final     Estimated  Creatinine Clearance: 137.4 mL/min (by C-G formula based on SCr of 0.75 mg/dL).  I/O last 3 completed shifts:  In: 2250 [I.V.:2200; IV Piggyback:50]  Out: 770 [Urine:720; Blood:50]  Patient weight: 115 kg (252 lb 6.8 oz)    Asessment/Plan  Initiate piperacillin/tazobactam 3.375 gm IV q 8 hrs.  Pharmacy will monitor Ms. Carrasco's renal function and clinical status and adjust the Zosyn dose and/or frequency as needed.      Thank you for the opportunity to consult on this patient.    Pradip Londono RP, Pharm.D.  12/03/23  14:23 EST

## 2023-12-03 NOTE — PROGRESS NOTES
Patient: Lauren Carrasco  Procedure(s):  DIAGNOSTIC LAPAROSCOPY with lysis of adhesions, exploratory laparatomy with ileocecectomy  Anesthesia type: general    Patient location: [facility] Surgical Floor  Last vitals:   Vitals:    12/03/23 1117   BP: 131/73   Pulse: 96   Resp: 20   Temp: 99 °F (37.2 °C)   SpO2: 97%     Level of consciousness: awake, alert, and oriented    Post-anesthesia pain: adequate analgesia  Airway patency: patent  Respiratory: unassisted  Cardiovascular: stable and blood pressure at baseline  Hydration: euvolemic    Anesthetic complications: no

## 2023-12-04 ENCOUNTER — TELEPHONE (OUTPATIENT)
Dept: SURGERY | Facility: CLINIC | Age: 33
End: 2023-12-04
Payer: MEDICAID

## 2023-12-04 LAB
ANION GAP SERPL CALCULATED.3IONS-SCNC: 11 MMOL/L (ref 5–15)
BUN SERPL-MCNC: 9 MG/DL (ref 6–20)
BUN/CREAT SERPL: 15.8 (ref 7–25)
CALCIUM SPEC-SCNC: 9 MG/DL (ref 8.6–10.5)
CHLORIDE SERPL-SCNC: 101 MMOL/L (ref 98–107)
CO2 SERPL-SCNC: 23 MMOL/L (ref 22–29)
CREAT SERPL-MCNC: 0.57 MG/DL (ref 0.57–1)
DEPRECATED RDW RBC AUTO: 43.4 FL (ref 37–54)
EGFRCR SERPLBLD CKD-EPI 2021: 123.2 ML/MIN/1.73
ERYTHROCYTE [DISTWIDTH] IN BLOOD BY AUTOMATED COUNT: 13.9 % (ref 12.3–15.4)
GLUCOSE SERPL-MCNC: 117 MG/DL (ref 65–99)
HCT VFR BLD AUTO: 32.8 % (ref 34–46.6)
HGB BLD-MCNC: 10.3 G/DL (ref 12–15.9)
MCH RBC QN AUTO: 26.6 PG (ref 26.6–33)
MCHC RBC AUTO-ENTMCNC: 31.4 G/DL (ref 31.5–35.7)
MCV RBC AUTO: 84.8 FL (ref 79–97)
PLATELET # BLD AUTO: 288 10*3/MM3 (ref 140–450)
PMV BLD AUTO: 10.2 FL (ref 6–12)
POTASSIUM SERPL-SCNC: 4.1 MMOL/L (ref 3.5–5.2)
RBC # BLD AUTO: 3.87 10*6/MM3 (ref 3.77–5.28)
SODIUM SERPL-SCNC: 135 MMOL/L (ref 136–145)
WBC NRBC COR # BLD AUTO: 12.21 10*3/MM3 (ref 3.4–10.8)

## 2023-12-04 PROCEDURE — 25010000002 MORPHINE PER 10 MG: Performed by: INTERNAL MEDICINE

## 2023-12-04 PROCEDURE — 25010000002 PROCHLORPERAZINE 10 MG/2ML SOLUTION: Performed by: INTERNAL MEDICINE

## 2023-12-04 PROCEDURE — 99232 SBSQ HOSP IP/OBS MODERATE 35: CPT | Performed by: FAMILY MEDICINE

## 2023-12-04 PROCEDURE — 25810000003 LACTATED RINGERS PER 1000 ML: Performed by: STUDENT IN AN ORGANIZED HEALTH CARE EDUCATION/TRAINING PROGRAM

## 2023-12-04 PROCEDURE — 80048 BASIC METABOLIC PNL TOTAL CA: CPT | Performed by: FAMILY MEDICINE

## 2023-12-04 PROCEDURE — 85027 COMPLETE CBC AUTOMATED: CPT | Performed by: FAMILY MEDICINE

## 2023-12-04 PROCEDURE — 99024 POSTOP FOLLOW-UP VISIT: CPT | Performed by: STUDENT IN AN ORGANIZED HEALTH CARE EDUCATION/TRAINING PROGRAM

## 2023-12-04 PROCEDURE — 25010000002 ONDANSETRON PER 1 MG: Performed by: STUDENT IN AN ORGANIZED HEALTH CARE EDUCATION/TRAINING PROGRAM

## 2023-12-04 PROCEDURE — 25010000002 PIPERACILLIN SOD-TAZOBACTAM PER 1 G: Performed by: FAMILY MEDICINE

## 2023-12-04 RX ADMIN — SODIUM CHLORIDE, POTASSIUM CHLORIDE, SODIUM LACTATE AND CALCIUM CHLORIDE 100 ML/HR: 600; 310; 30; 20 INJECTION, SOLUTION INTRAVENOUS at 21:31

## 2023-12-04 RX ADMIN — PIPERACILLIN SODIUM AND TAZOBACTAM SODIUM 3.38 G: 3; .375 INJECTION, SOLUTION INTRAVENOUS at 05:12

## 2023-12-04 RX ADMIN — PIPERACILLIN SODIUM AND TAZOBACTAM SODIUM 3.38 G: 3; .375 INJECTION, SOLUTION INTRAVENOUS at 21:31

## 2023-12-04 RX ADMIN — HYDROCODONE BITARTRATE AND ACETAMINOPHEN 1 TABLET: 7.5; 325 TABLET ORAL at 17:24

## 2023-12-04 RX ADMIN — PIPERACILLIN SODIUM AND TAZOBACTAM SODIUM 3.38 G: 3; .375 INJECTION, SOLUTION INTRAVENOUS at 15:00

## 2023-12-04 RX ADMIN — ONDANSETRON 4 MG: 2 INJECTION INTRAMUSCULAR; INTRAVENOUS at 16:49

## 2023-12-04 RX ADMIN — ONDANSETRON 4 MG: 2 INJECTION INTRAMUSCULAR; INTRAVENOUS at 10:07

## 2023-12-04 RX ADMIN — ONDANSETRON 4 MG: 2 INJECTION INTRAMUSCULAR; INTRAVENOUS at 04:20

## 2023-12-04 RX ADMIN — SODIUM CHLORIDE, POTASSIUM CHLORIDE, SODIUM LACTATE AND CALCIUM CHLORIDE 100 ML/HR: 600; 310; 30; 20 INJECTION, SOLUTION INTRAVENOUS at 01:52

## 2023-12-04 RX ADMIN — DOCUSATE SODIUM 50MG AND SENNOSIDES 8.6MG 2 TABLET: 8.6; 5 TABLET, FILM COATED ORAL at 19:45

## 2023-12-04 RX ADMIN — MORPHINE SULFATE 4 MG: 4 INJECTION, SOLUTION INTRAMUSCULAR; INTRAVENOUS at 06:11

## 2023-12-04 RX ADMIN — DOCUSATE SODIUM 50MG AND SENNOSIDES 8.6MG 2 TABLET: 8.6; 5 TABLET, FILM COATED ORAL at 10:08

## 2023-12-04 RX ADMIN — PROCHLORPERAZINE EDISYLATE 5 MG: 5 INJECTION INTRAMUSCULAR; INTRAVENOUS at 05:10

## 2023-12-04 RX ADMIN — HYDROCODONE BITARTRATE AND ACETAMINOPHEN 1 TABLET: 7.5; 325 TABLET ORAL at 10:07

## 2023-12-04 RX ADMIN — HYDROCODONE BITARTRATE AND ACETAMINOPHEN 1 TABLET: 7.5; 325 TABLET ORAL at 03:34

## 2023-12-04 RX ADMIN — PROCHLORPERAZINE EDISYLATE 5 MG: 5 INJECTION INTRAMUSCULAR; INTRAVENOUS at 19:44

## 2023-12-04 NOTE — PROGRESS NOTES
Reece Carrasco  : 1990  MRN: [id number]  CSN: [id number]    Progress Note    Subjective   She had some nausea this am. She wasn't sure if it was from the Lortab or not. She has been up this morning. Pain is well controlled.     Objective     Min/max vitals past 24 hours:   Temp  Min: 97.8 °F (36.6 °C)  Max: 99 °F (37.2 °C)  BP  Min: 121/68  Max: 131/73  Pulse  Min: 87  Max: 96  Resp  Min: 18  Max: 20         General: well developed; well nourished  no acute distress   Heart: Not performed.   Lungs: breathing is unlabored   Abdomen: soft, non-tender; no masses, decreased bowel sounds  CSection incision is clean, dry, and intact. Small area on right side of incision is open, no redness or edema, pink tissue  Wound vac is present in umbilicus   FHT's: NA   Cervix: NA       Back:    Extremities: normal appearance with no cyanosis or edema and no calf tenderness      Labs:   Lab Results (last 24 hours)       Procedure Component Value Units Date/Time    Basic Metabolic Panel [[id number]]  (Abnormal) Collected: 23    Specimen: Blood Updated: 23     Glucose 117 mg/dL      BUN 9 mg/dL      Creatinine 0.57 mg/dL      Sodium 135 mmol/L      Potassium 4.1 mmol/L      Chloride 101 mmol/L      CO2 23.0 mmol/L      Calcium 9.0 mg/dL      BUN/Creatinine Ratio 15.8     Anion Gap 11.0 mmol/L      eGFR 123.2 mL/min/1.73     Narrative:      GFR Normal >60  Chronic Kidney Disease <60  Kidney Failure <15      CBC (No Diff) [[id number]]  (Abnormal) Collected: 23    Specimen: Blood Updated: 23     WBC 12.21 10*3/mm3      RBC 3.87 10*6/mm3      Hemoglobin 10.3 g/dL      Hematocrit 32.8 %      MCV 84.8 fL      MCH 26.6 pg      MCHC 31.4 g/dL      RDW 13.9 %      RDW-SD 43.4 fl      MPV 10.2 fL      Platelets 288 10*3/mm3     Wound Culture - Surgical Site, Abdominal Wall [[id number]] Collected: 23    Specimen: Surgical Site from Abdominal Wall Updated:  12/03/23 1003     Wound Culture No growth     Gram Stain Moderate (3+) WBCs seen      No No organisms seen               Assessment   S/P CSection 11/7 with small area of dehiscence  2.   Abscess RLQ surgery per General surgery 12/2     Plan   Continue present mgt.    Magaly Brown, APRN  12/4/2023  09:01 EST

## 2023-12-04 NOTE — TELEPHONE ENCOUNTER
NISHA GARCIA FOR PROCEDURE DONE WITH DR. PATEL ON 12/02/2023-AUTH #- [id number]--IFZA # -[id number]--[id number] W/ HUMANA/AVAILITY--CODE--[id number]

## 2023-12-04 NOTE — PROGRESS NOTES
[facility]    Name:  Lauren Carrasco   Age:  33 y.o.  Sex:  female  :  1990  MRN:  [id number]   Visit Number:  [id number]  Admission Date:  2023  Date Of Service:  23  Primary Care Physician:  Jaqueline Hays APRN     LOS: 1 day :    Chief Complaint:      Abdominal pain     Subjective:    Patient was seen and examined at bedside today.   at bedside.  Patient sitting up comfortably in bed with no distress.  Patient reports good control on her abdominal pain with pain medicine however pain medicine has been giving her nausea.  She has been passing flatus but no bowel movement yet.  Patient trying to ambulate.  Nausea responding to Zofran and denies any vomiting.  No other acute complaints today.  Vitals are stable.    Hospital Course:    Patient is a 33 years old female with a past medical history of  3.5 weeks who presented to the ER with a chief complaint of abdominal pain.  Patient reports that she was doing well after her  until 3 days ago when she started having abdominal discomfort in her right lower quadrant that radiated to her back, patient then started having fever yesterday of 100.5 F and her pain became worse so she decided to come to the ER for evaluation.  Patient denies any vomiting or urinary symptoms.  Patient denies changes in her bowel habits.  Patient has some remaining mild incisional drainage at the site of her . Patient confirmed that her appendix ruptured when she was 11 years old and had appendectomy.  No other abdominal surgeries.     On ER evaluation, her vitals were stable and was afebrile.  Her workup was pretty much within acceptable range except for hemoglobin of 10.0.  WBC WNL.  Urinalysis negative for infection.  CT abdomen pelvis showed findings consistent with acute appendicitis, Dilated appendix measuring up to 2.2 cm in diameter, with adjacent fat stranding and  small fluid. Appendicolith at the appendix base. Heterogeneous fluid collection anterior to the uterus, which could be due to infected fluid or blood products. No evidence of active bleeding. Anterior abdominal wall  scar with no visible associated abscess.  Patient received Toradol.  Case discussed with Dr. Miranda with general surgery who recommended admission for observation, no indication for antibiotics at this time with plan on repeating CT scan for evaluation. Hospitalist consulted for admission.    Dr. Miranda performed a diagnostic laparoscopy converted to laparotomy with ileocecectomy on . Dr. Otero with GYN consulted for possible wound dehiscence, He thought that her  site appeared healthy with no evidence of infection. Recommended leave  site open to air, avoid manipulation and continue antibiotics. Antibiotics switched from cefazolin/metronidazole to Zosyn after discussing with general surgery and OB/GYN.    Review of Systems:     All systems were reviewed and negative except as mentioned in subjective, assessment and plan.    Vital Signs:    Temp:  [97.8 °F (36.6 °C)-99 °F (37.2 °C)] 98.8 °F (37.1 °C)  Heart Rate:  [87-96] 94  Resp:  [18-20] 18  BP: (121-131)/(67-73) 129/73    Intake and output:    I/O last 3 completed shifts:  In: 1560 [P.O.:60; I.V.:1500]  Out: 3070 [Urine:3070]  No intake/output data recorded.    Physical Examination:    General Appearance:  Alert and cooperative.  Obese.  No acute distress.   Head:  Atraumatic and normocephalic.   Eyes: Conjunctivae and sclerae normal, no icterus. No pallor.   Throat: No oral lesions, no thrush, oral mucosa moist.   Neck: Supple, trachea midline, no thyromegaly.   Lungs:   Breath sounds heard bilaterally equally.  No wheezing or crackles. No Pleural rub or bronchial breathing.   Heart:  Normal S1 and S2, no murmur, no gallop, no rub. No JVD.   Abdomen:   Normal bowel sounds, no masses, no organomegaly. Soft, as  "expected tenderness postop, mildly distended.  Wound VAC with dry dressing. Laparoscopic incisions clean with no signs of infection. lower  incision no evidence of infection or tenderness.   Extremities: Supple, no edema, no cyanosis, no clubbing.   Skin: No bleeding or rash.   Neurologic: Alert and oriented x 3. No facial asymmetry. Moves all four limbs. No tremors.      Laboratory results:    Results from last 7 days   Lab Units 23  0531 23  0553 23  0308   SODIUM mmol/L 135* 136 141   POTASSIUM mmol/L 4.1 4.2 3.8   CHLORIDE mmol/L 101 103 105   CO2 mmol/L 23.0 22.7 24.8   BUN mg/dL 9 10 14   CREATININE mg/dL 0.57 0.75 0.87   CALCIUM mg/dL 9.0 8.9 9.3   BILIRUBIN mg/dL  --   --  0.4   ALK PHOS U/L  --   --  104   ALT (SGPT) U/L  --   --  23   AST (SGOT) U/L  --   --  16   GLUCOSE mg/dL 117* 143* 98     Results from last 7 days   Lab Units 23  0531 23  0553 23  0308   WBC 10*3/mm3 12.21* 11.88* 8.89   HEMOGLOBIN g/dL 10.3* 10.0* 10.0*   HEMATOCRIT % 32.8* 31.8* 31.3*   PLATELETS 10*3/mm3 288 279 259         Results from last 7 days   Lab Units 23  0308   HSTROP T ng/L <6     Results from last 7 days   Lab Units 23  0418   WOUNDCX  No growth     No results for input(s): \"PHART\", \"XYK6LER\", \"PO2ART\", \"NNR5EIG\", \"BASEEXCESS\" in the last 8760 hours.   I have reviewed the patient's laboratory results.    Radiology results:    Peripheral Block    Result Date: 2023  Ash Tariq, PETRONA     2023  4:58 PM Peripheral Block Pre-sedation assessment completed: 2023 4:52 PM Patient reassessed immediately prior to procedure Patient location during procedure: OR Start time: 2023 4:53 PM Stop time: 2023 4:56 PM Reason for block: at surgeon's request and post-op pain management Performed by CRNA/CAA: Ash Tariq CRNA Preanesthetic Checklist Completed: patient identified, IV checked, site marked, risks and benefits discussed, surgical " consent, monitors and equipment checked, pre-op evaluation and timeout performed Prep: Pt Position: supine Sterile barriers:gloves, cap, sterile barriers and mask Prep: ChloraPrep Patient monitoring: blood pressure monitoring, continuous pulse oximetry and EKG Procedure Sedation: yes Performed under: general Guidance:ultrasound guided ULTRASOUND INTERPRETATION.  Using ultrasound guidance a 20 G gauge needle was placed in close proximity to the nerve, at which point, under ultrasound guidance anesthetic was injected in the area of the nerve and spread of the anesthesia was seen on ultrasound in close proximity thereto.  There were no abnormalities seen on ultrasound; a digital image was taken; and the patient tolerated the procedure with no complications. Images:still images obtained Laterality:Bilateral Block Type:TAP Injection Technique:single-shot Needle Type:echogenic Needle Gauge:20 G Resistance on Injection: none Medications Used: ropivacaine (NAROPIN) injection 0.5 % - Peripheral Nerve  30 mL - 12/2/2023 4:53:00 PM Medications Preservative Free Saline:30ml Comment:Block Injection: LA dose divided between Right and Left Block Adjuncts per total volume If required, intravenous sedation was given -- see meds on anesthesia record. Post Assessment Injection Assessment: negative aspiration for heme, no paresthesia on injection and incremental injection Patient Tolerance:comfortable throughout block Complications:no Additional Notes Procedure:      BILATERAL TAP BLOCKS                          Patient analgesia was achieved with General Anesthesia The pt was placed in the Supine Position and under Ultrasound guidance, an echogenic or touhy needle was advanced with Normal Saline hydro dissection of tissue.  The Internal Oblique and Transversus Abdominus muscles were visualized.  At or before the aponeurosis of Internal Oblique, the local anesthetic spread was visualized in the Transversus Abdominus Plane. Injection  was made incrementally with aspiration every 5 mls.  There was no intravascular injection;  injection pressure was normal; there was no neural injection; and the procedure was completed without difficulty.    I have reviewed the patient's radiology reports.    Medication Review:     I have reviewed the patient's active and prn medications.     Problem List:      Right lower quadrant abdominal pain    Abdominal pain, right lower quadrant      Assessment:    Possible appendicitis, POA  Status post laparotomy with ileocecectomy   Status post   Right lower quadrant abdominal pain, POA    Plan:    Possible appendicitis  Status post laparotomy with ileocecectomy  -Dr. Miranda performed a diagnostic laparoscopy converted to laparotomy with ileocecectomy on .  -Pain control  -Continue IV fluids  -On Zosyn  -Encouraged ambulation.  -Passing flatus but no bowel movement yet.  -Advance to clear liquids today.    Status post   -Dr. Otero consulted for possible wound dehiscence  -He thought that her  site appeared healthy with no evidence of infection   -Recommended leave  site open to air, avoid manipulation and continue antibiotics   -Will follow-up on wound cultures from  site, which has been with no growth     Further orders as indicated per clinical course.    I have reviewed the copied text and it is accurate as of 2023      DVT Prophylaxis: SCDs, ambulatory  Code Status: Full  Diet: N.p.o. sips with meds  Discharge Plan: Likely home in 1 to 2 days.    Joe Webb MD  23  07:31 EST    Dictated utilizing Dragon dictation.

## 2023-12-04 NOTE — PLAN OF CARE
Problem: Adult Inpatient Plan of Care  Goal: Plan of Care Review  Outcome: Ongoing, Progressing  Goal: Patient-Specific Goal (Individualized)  Outcome: Ongoing, Progressing  Goal: Absence of Hospital-Acquired Illness or Injury  Outcome: Ongoing, Progressing  Intervention: Identify and Manage Fall Risk  Recent Flowsheet Documentation  Taken 12/4/2023 0000 by Berta Betancourt RN  Safety Promotion/Fall Prevention:   activity supervised   assistive device/personal items within reach   clutter free environment maintained   safety round/check completed  Taken 12/3/2023 2200 by Berta Betancourt RN  Safety Promotion/Fall Prevention:   activity supervised   assistive device/personal items within reach   clutter free environment maintained   safety round/check completed  Taken 12/3/2023 2000 by Berta Betancourt RN  Safety Promotion/Fall Prevention:   activity supervised   assistive device/personal items within reach   clutter free environment maintained   safety round/check completed  Intervention: Prevent Skin Injury  Recent Flowsheet Documentation  Taken 12/4/2023 0000 by Berta Betancourt RN  Body Position:   position changed independently   supine, legs elevated  Taken 12/3/2023 2200 by Berta Betancourt RN  Body Position:   position changed independently   supine, legs elevated  Taken 12/3/2023 2000 by Berta Betancourt RN  Body Position:   position changed independently   sitting up in bed  Intervention: Prevent and Manage VTE (Venous Thromboembolism) Risk  Recent Flowsheet Documentation  Taken 12/4/2023 0000 by Berta Betancourt RN  Activity Management: activity encouraged  Taken 12/3/2023 2132 by Berta Betancourt RN  Activity Management: patient refuses activity  Taken 12/3/2023 2000 by Berta Betancourt RN  Activity Management: activity encouraged  Intervention: Prevent Infection  Recent Flowsheet Documentation  Taken 12/4/2023 0000 by Berta Betancourt RN  Infection Prevention: environmental  surveillance performed  Taken 12/3/2023 2200 by Berta Betancourt RN  Infection Prevention: environmental surveillance performed  Taken 12/3/2023 2000 by Berta Betancourt RN  Infection Prevention: environmental surveillance performed  Goal: Optimal Comfort and Wellbeing  Outcome: Ongoing, Progressing  Goal: Readiness for Transition of Care  Outcome: Ongoing, Progressing     Problem: Pain Acute  Goal: Acceptable Pain Control and Functional Ability  Outcome: Ongoing, Progressing  Intervention: Prevent or Manage Pain  Recent Flowsheet Documentation  Taken 12/4/2023 0000 by Berta Betancourt RN  Medication Review/Management: medications reviewed  Taken 12/3/2023 2200 by Berta Betancourt RN  Medication Review/Management: medications reviewed     Problem: Impaired Wound Healing  Goal: Optimal Wound Healing  Outcome: Ongoing, Progressing  Intervention: Promote Wound Healing  Recent Flowsheet Documentation  Taken 12/4/2023 0000 by Berta Betancourt RN  Activity Management: activity encouraged  Taken 12/3/2023 2132 by Berta Betancourt RN  Activity Management: patient refuses activity  Taken 12/3/2023 2000 by Berta Betancourt RN  Activity Management: activity encouraged     Problem: Bowel Motility Impaired (Surgery Nonspecified)  Goal: Effective Bowel Elimination  Outcome: Ongoing, Progressing     Problem: Fluid and Electrolyte Imbalance (Surgery Nonspecified)  Goal: Fluid and Electrolyte Balance  Outcome: Ongoing, Progressing     Problem: Pain (Surgery Nonspecified)  Goal: Acceptable Pain Control  Outcome: Ongoing, Progressing     Problem: Postoperative Nausea and Vomiting (Surgery Nonspecified)  Goal: Nausea and Vomiting Relief  Outcome: Ongoing, Progressing     Problem: Respiratory Compromise (Surgery Nonspecified)  Goal: Effective Oxygenation and Ventilation  Outcome: Ongoing, Progressing  Intervention: Optimize Oxygenation and Ventilation  Recent Flowsheet Documentation  Taken 12/4/2023 0000 by Berta Betancourt  LINDA RN  Head of Bed (HOB) Positioning: HOB elevated  Taken 12/3/2023 2200 by Berta Betancourt RN  Head of Bed (HOB) Positioning: HOB elevated  Taken 12/3/2023 2000 by Berta Betancourt RN  Head of Bed ([facility]) Positioning: HOB elevated   Goal Outcome Evaluation:      Plan of care reviewed with patient. Patient has rested between care tonight. Patient does have intermittent pain and nausea. Denies a bowel movement, positive flatus. No significant changes this shift.

## 2023-12-04 NOTE — PROGRESS NOTES
LOS: 1 day   Patient Care Team:  Jaqueline Hays APRN as PCP - General (Family Medicine)  Alma Rosa Birch, RN as Nurse Navigator (Obstetrics)       Chief Complaint: POD2 diagnostic laparoscopy converted to laparotomy with ileocecectomy    HPI: Patient seen and examined at bedside. No acute events overnight. She states her abdominal pain has improved today. She did pass some flatus yesterday, but nothing yet today. She has had nausea without vomiting associated with medications and this is usually controlled with Zofran. She has been ambulating in the halls.     Vital Signs  Temp:  [97.8 °F (36.6 °C)-99 °F (37.2 °C)] 98.7 °F (37.1 °C)  Heart Rate:  [87-96] 92  Resp:  [18-20] 18  BP: (121-131)/(67-73) 129/71    Physical Exam:     General Appearance:  Alert and cooperative, not in any acute distress.   Respiratory/Lungs:   Breath sounds heard bilaterally equally.  No crackles or wheezing. No Pleural rub or bronchial breathing. Normal respiratory effort.    Cardiovascular/Heart:  Normal S1 and S2, no murmur. No edema   GI/Abdomen:   Soft, obese, appropriately tender to palpation, non-distended, hypoactive bowel sounds, Prevena in place with minimal drainage, other incisions are clean, dry, and intact                Musculoskeletal/ Extremities:   Moves all extremities well   Skin: No bleeding, bruising or rash, no induration   Psychiatric : Alert and oriented ×3.  No depression or anxiety    Neurologic: Cranial nerves 2 - 12 grossly intact, sensation intact, Motor power is normal and equal bilaterally.     Results Review:       Lab Results (last 24 hours)       Procedure Component Value Units Date/Time    Wound Culture - Surgical Site, Abdominal Wall [[id number]] Collected: 12/02/23 0418    Specimen: Surgical Site from Abdominal Wall Updated: 12/04/23 0965     Wound Culture Scant growth (1+) Normal Skin Noemí     Gram Stain Moderate (3+) WBCs seen      No No organisms seen    Narrative:      Organism under  investigation.      Basic Metabolic Panel [[id number]]  (Abnormal) Collected: 12/04/23 0531    Specimen: Blood Updated: 12/04/23 0613     Glucose 117 mg/dL      BUN 9 mg/dL      Creatinine 0.57 mg/dL      Sodium 135 mmol/L      Potassium 4.1 mmol/L      Chloride 101 mmol/L      CO2 23.0 mmol/L      Calcium 9.0 mg/dL      BUN/Creatinine Ratio 15.8     Anion Gap 11.0 mmol/L      eGFR 123.2 mL/min/1.73     Narrative:      GFR Normal >60  Chronic Kidney Disease <60  Kidney Failure <15      CBC (No Diff) [[id number]]  (Abnormal) Collected: 12/04/23 0531    Specimen: Blood Updated: 12/04/23 0553     WBC 12.21 10*3/mm3      RBC 3.87 10*6/mm3      Hemoglobin 10.3 g/dL      Hematocrit 32.8 %      MCV 84.8 fL      MCH 26.6 pg      MCHC 31.4 g/dL      RDW 13.9 %      RDW-SD 43.4 fl      MPV 10.2 fL      Platelets 288 10*3/mm3                 Assessment & Plan       Right lower quadrant abdominal pain    Abdominal pain, right lower quadrant    Patient is POD1 diagnostic laparoscopy converted to laparotomy with ileocecectomy. Patient's pain is improving, but she has had some nausea associated with medications. Will start her on a clear liquid diet so she can take something with medications. I have told her to go slow and avoid carbonation. She should continue to ambulate as much as tolerated. Awaiting return of bowel function she has passed flatus. Post operative ileus would be expected, but she does still have bowel sounds. Leukocytosis noted today, likely reactive, but will continue to trend. Continue Zosyn. Patient expresses understanding and all questions were answered.    Kelly Miranda DO  12/04/23  09:44 EST

## 2023-12-04 NOTE — CASE MANAGEMENT/SOCIAL WORK
Discharge Planning Assessment  [facility]     Patient Name: Lauren Carrasco  MRN: [id number]  Today's Date: 12/4/2023    Admit Date: 12/2/2023    Plan: home   Discharge Needs Assessment    No documentation.                  Discharge Plan       Row Name 12/04/23 1230       Plan    Plan Comments PLAN HOME WITH FAMILY                  Continued Care and Services - Admitted Since 12/2/2023    Coordination has not been started for this encounter.       Selected Continued Care - Episodes Includes continued care and service providers with selected services from the active episodes listed below      [facility] Episode start date: 5/31/2023   There are no active outsourced providers for this episode.                 Expected Discharge Date and Time       Expected Discharge Date Expected Discharge Time    Dec 5, 2023            Demographic Summary    No documentation.                  Functional Status    No documentation.                  Psychosocial    No documentation.                  Abuse/Neglect    No documentation.                  Legal    No documentation.                  Substance Abuse    No documentation.                  Patient Forms    No documentation.                     Lulú House RN

## 2023-12-05 ENCOUNTER — PATIENT OUTREACH (OUTPATIENT)
Dept: LABOR AND DELIVERY | Facility: HOSPITAL | Age: 33
End: 2023-12-05
Payer: MEDICAID

## 2023-12-05 LAB
ANION GAP SERPL CALCULATED.3IONS-SCNC: 9.1 MMOL/L (ref 5–15)
BACTERIA SPEC AEROBE CULT: ABNORMAL
BACTERIA SPEC AEROBE CULT: ABNORMAL
BASOPHILS # BLD AUTO: 0.04 10*3/MM3 (ref 0–0.2)
BASOPHILS NFR BLD AUTO: 0.4 % (ref 0–1.5)
BUN SERPL-MCNC: 10 MG/DL (ref 6–20)
BUN/CREAT SERPL: 17.2 (ref 7–25)
CALCIUM SPEC-SCNC: 8.8 MG/DL (ref 8.6–10.5)
CHLORIDE SERPL-SCNC: 102 MMOL/L (ref 98–107)
CO2 SERPL-SCNC: 24.9 MMOL/L (ref 22–29)
CREAT SERPL-MCNC: 0.58 MG/DL (ref 0.57–1)
DEPRECATED RDW RBC AUTO: 43.4 FL (ref 37–54)
EGFRCR SERPLBLD CKD-EPI 2021: 122.7 ML/MIN/1.73
EOSINOPHIL # BLD AUTO: 0.11 10*3/MM3 (ref 0–0.4)
EOSINOPHIL NFR BLD AUTO: 1.1 % (ref 0.3–6.2)
ERYTHROCYTE [DISTWIDTH] IN BLOOD BY AUTOMATED COUNT: 13.8 % (ref 12.3–15.4)
GLUCOSE SERPL-MCNC: 109 MG/DL (ref 65–99)
GRAM STN SPEC: ABNORMAL
GRAM STN SPEC: ABNORMAL
HCT VFR BLD AUTO: 30 % (ref 34–46.6)
HGB BLD-MCNC: 9.2 G/DL (ref 12–15.9)
IMM GRANULOCYTES # BLD AUTO: 0.17 10*3/MM3 (ref 0–0.05)
IMM GRANULOCYTES NFR BLD AUTO: 1.7 % (ref 0–0.5)
LYMPHOCYTES # BLD AUTO: 0.92 10*3/MM3 (ref 0.7–3.1)
LYMPHOCYTES NFR BLD AUTO: 9.4 % (ref 19.6–45.3)
MCH RBC QN AUTO: 26.4 PG (ref 26.6–33)
MCHC RBC AUTO-ENTMCNC: 30.7 G/DL (ref 31.5–35.7)
MCV RBC AUTO: 86.2 FL (ref 79–97)
MONOCYTES # BLD AUTO: 0.61 10*3/MM3 (ref 0.1–0.9)
MONOCYTES NFR BLD AUTO: 6.2 % (ref 5–12)
NEUTROPHILS NFR BLD AUTO: 7.93 10*3/MM3 (ref 1.7–7)
NEUTROPHILS NFR BLD AUTO: 81.2 % (ref 42.7–76)
NRBC BLD AUTO-RTO: 0 /100 WBC (ref 0–0.2)
PLATELET # BLD AUTO: 295 10*3/MM3 (ref 140–450)
PMV BLD AUTO: 10.1 FL (ref 6–12)
POTASSIUM SERPL-SCNC: 4.1 MMOL/L (ref 3.5–5.2)
RBC # BLD AUTO: 3.48 10*6/MM3 (ref 3.77–5.28)
REF LAB TEST METHOD: NORMAL
SODIUM SERPL-SCNC: 136 MMOL/L (ref 136–145)
WBC NRBC COR # BLD AUTO: 9.78 10*3/MM3 (ref 3.4–10.8)

## 2023-12-05 PROCEDURE — 25010000002 MORPHINE PER 10 MG: Performed by: INTERNAL MEDICINE

## 2023-12-05 PROCEDURE — 25010000002 PROCHLORPERAZINE 10 MG/2ML SOLUTION: Performed by: INTERNAL MEDICINE

## 2023-12-05 PROCEDURE — 99024 POSTOP FOLLOW-UP VISIT: CPT | Performed by: STUDENT IN AN ORGANIZED HEALTH CARE EDUCATION/TRAINING PROGRAM

## 2023-12-05 PROCEDURE — 85025 COMPLETE CBC W/AUTO DIFF WBC: CPT | Performed by: FAMILY MEDICINE

## 2023-12-05 PROCEDURE — 25010000002 PIPERACILLIN SOD-TAZOBACTAM PER 1 G: Performed by: FAMILY MEDICINE

## 2023-12-05 PROCEDURE — 80048 BASIC METABOLIC PNL TOTAL CA: CPT | Performed by: FAMILY MEDICINE

## 2023-12-05 PROCEDURE — 25010000002 ONDANSETRON PER 1 MG: Performed by: STUDENT IN AN ORGANIZED HEALTH CARE EDUCATION/TRAINING PROGRAM

## 2023-12-05 PROCEDURE — 99232 SBSQ HOSP IP/OBS MODERATE 35: CPT | Performed by: NURSE PRACTITIONER

## 2023-12-05 RX ADMIN — MORPHINE SULFATE 4 MG: 4 INJECTION, SOLUTION INTRAMUSCULAR; INTRAVENOUS at 06:23

## 2023-12-05 RX ADMIN — PIPERACILLIN SODIUM AND TAZOBACTAM SODIUM 3.38 G: 3; .375 INJECTION, SOLUTION INTRAVENOUS at 05:14

## 2023-12-05 RX ADMIN — DOCUSATE SODIUM 50MG AND SENNOSIDES 8.6MG 2 TABLET: 8.6; 5 TABLET, FILM COATED ORAL at 20:38

## 2023-12-05 RX ADMIN — PROCHLORPERAZINE EDISYLATE 5 MG: 5 INJECTION INTRAMUSCULAR; INTRAVENOUS at 09:34

## 2023-12-05 RX ADMIN — PIPERACILLIN SODIUM AND TAZOBACTAM SODIUM 3.38 G: 3; .375 INJECTION, SOLUTION INTRAVENOUS at 20:39

## 2023-12-05 RX ADMIN — ONDANSETRON 4 MG: 2 INJECTION INTRAMUSCULAR; INTRAVENOUS at 20:38

## 2023-12-05 RX ADMIN — HYDROCODONE BITARTRATE AND ACETAMINOPHEN 1 TABLET: 7.5; 325 TABLET ORAL at 09:34

## 2023-12-05 RX ADMIN — HYDROCODONE BITARTRATE AND ACETAMINOPHEN 1 TABLET: 7.5; 325 TABLET ORAL at 21:52

## 2023-12-05 RX ADMIN — ONDANSETRON 4 MG: 2 INJECTION INTRAMUSCULAR; INTRAVENOUS at 00:07

## 2023-12-05 RX ADMIN — Medication 10 ML: at 09:35

## 2023-12-05 RX ADMIN — PROCHLORPERAZINE EDISYLATE 5 MG: 5 INJECTION INTRAMUSCULAR; INTRAVENOUS at 03:33

## 2023-12-05 RX ADMIN — HYDROCODONE BITARTRATE AND ACETAMINOPHEN 1 TABLET: 7.5; 325 TABLET ORAL at 03:30

## 2023-12-05 RX ADMIN — HYDROCODONE BITARTRATE AND ACETAMINOPHEN 1 TABLET: 7.5; 325 TABLET ORAL at 15:19

## 2023-12-05 RX ADMIN — PROCHLORPERAZINE EDISYLATE 5 MG: 5 INJECTION INTRAMUSCULAR; INTRAVENOUS at 15:19

## 2023-12-05 RX ADMIN — PIPERACILLIN SODIUM AND TAZOBACTAM SODIUM 3.38 G: 3; .375 INJECTION, SOLUTION INTRAVENOUS at 14:03

## 2023-12-05 RX ADMIN — DOCUSATE SODIUM 50MG AND SENNOSIDES 8.6MG 2 TABLET: 8.6; 5 TABLET, FILM COATED ORAL at 09:34

## 2023-12-05 RX ADMIN — ONDANSETRON 4 MG: 2 INJECTION INTRAMUSCULAR; INTRAVENOUS at 06:23

## 2023-12-05 RX ADMIN — Medication 5 MG: at 22:46

## 2023-12-05 NOTE — PROGRESS NOTES
[facility]    Name:  Lauren Carrasco   Age:  33 y.o.  Sex:  female  :  1990  MRN:  [id number]   Visit Number:  [id number]  Admission Date:  2023  Date Of Service:  23  Primary Care Physician:  Jaqueline Hays APRN     LOS: 2 days :    Chief Complaint:      Abdominal Pain    Subjective:    Patient seen and examined at bedside this morning.  Patient is pleasant in conversation.  States she is ok, and did not need IV pain medication overnight.  States she had flatus yesterday, no bowel movement.  Moving around in room as able.  Discussed using incentive spirometer and walking hallway today.    Hospital Course:    Patient is a 33 years old female with a past medical history of  3.5 weeks who presented to the ER with a chief complaint of abdominal pain.  Patient reports that she was doing well after her  until 3 days ago when she started having abdominal discomfort in her right lower quadrant that radiated to her back, patient then started having fever yesterday of 100.5 F and her pain became worse so she decided to come to the ER for evaluation.  Patient denies any vomiting or urinary symptoms.  Patient denies changes in her bowel habits.  Patient has some remaining mild incisional drainage at the site of her . Patient confirmed that her appendix ruptured when she was 11 years old and had appendectomy.  No other abdominal surgeries.     On ER evaluation, her vitals were stable and was afebrile.  Her workup was pretty much within acceptable range except for hemoglobin of 10.0.  WBC WNL.  Urinalysis negative for infection.  CT abdomen pelvis showed findings consistent with acute appendicitis, Dilated appendix measuring up to 2.2 cm in diameter, with adjacent fat stranding and small fluid. Appendicolith at the appendix base. Heterogeneous fluid collection anterior to the uterus, which could be due to infected  fluid or blood products. No evidence of active bleeding. Anterior abdominal wall  scar with no visible associated abscess.  Patient received Toradol.  Case discussed with Dr. Miranda with general surgery who recommended admission for observation, no indication for antibiotics at this time with plan on repeating CT scan for evaluation. Hospitalist consulted for admission.     Dr. Miranda performed a diagnostic laparoscopy converted to laparotomy with ileocecectomy on . Dr. Otero with GYN consulted for possible wound dehiscence, He thought that her  site appeared healthy with no evidence of infection. Recommended leave  site open to air, avoid manipulation and continue antibiotics. Antibiotics switched from cefazolin/metronidazole to Zosyn after discussing with general surgery and OB/GYN.    Review of Systems:     All systems were reviewed and negative except as mentioned in subjective, assessment and plan.    Vital Signs:    Temp:  [97.9 °F (36.6 °C)-98.8 °F (37.1 °C)] 98.8 °F (37.1 °C)  Heart Rate:  [78-88] 78  Resp:  [16-18] 16  BP: (119-137)/(59-80) 130/59    Intake and output:    I/O last 3 completed shifts:  In: 4650 [P.O.:480; I.V.:4170]  Out: 2000 [Urine:2000]  I/O this shift:  In: 120 [P.O.:120]  Out: -     Physical Examination:    General Appearance:  Alert and cooperative, no acute distress on exam, young female, pleasant   Head:  Atraumatic and normocephalic.   Eyes: Conjunctivae and sclerae normal, no icterus. No pallor.   Throat: No oral lesions, no thrush, oral mucosa moist.   Neck: Supple, trachea midline   Lungs:   Breath sounds heard bilaterally equally.  No wheezing or crackles. Unlabored on room air   Heart:  Normal S1 and S2, no murmur, no gallop, no rub. No JVD.   Abdomen:   Sluggish bowel sounds, no masses, no organomegaly. Soft, expected tenderness post-op, vertical wound with wound vac in place with dressing CDI, Csection wound with no evidence of infection, lap  "sites noted without s/s of infection    Extremities: Supple, no edema, no cyanosis, no clubbing.   Skin: No bleeding or rash.   Neurologic: Alert and oriented x 3. No facial asymmetry. Moves all four limbs. No tremors.       Laboratory results:    Results from last 7 days   Lab Units 23  0525 23  0531 23  0553 23  0308   SODIUM mmol/L 136 135* 136 141   POTASSIUM mmol/L 4.1 4.1 4.2 3.8   CHLORIDE mmol/L 102 101 103 105   CO2 mmol/L 24.9 23.0 22.7 24.8   BUN mg/dL 10 9 10 14   CREATININE mg/dL 0.58 0.57 0.75 0.87   CALCIUM mg/dL 8.8 9.0 8.9 9.3   BILIRUBIN mg/dL  --   --   --  0.4   ALK PHOS U/L  --   --   --  104   ALT (SGPT) U/L  --   --   --  23   AST (SGOT) U/L  --   --   --  16   GLUCOSE mg/dL 109* 117* 143* 98     Results from last 7 days   Lab Units 23  0525 23  0531 23  0553   WBC 10*3/mm3 9.78 12.21* 11.88*   HEMOGLOBIN g/dL 9.2* 10.3* 10.0*   HEMATOCRIT % 30.0* 32.8* 31.8*   PLATELETS 10*3/mm3 295 288 279         Results from last 7 days   Lab Units 23  0308   HSTROP T ng/L <6     Results from last 7 days   Lab Units 23  0418   WOUNDCX  Scant growth (1+) Normal Skin Noemí     No results for input(s): \"PHART\", \"KLK9AVN\", \"PO2ART\", \"CRL3LSJ\", \"BASEEXCESS\" in the last 8760 hours.   I have reviewed the patient's laboratory results.    Radiology results:    No radiology results from the last 24 hrs  I have reviewed the patient's radiology reports.    Medication Review:     I have reviewed the patient's active and prn medications.     Problem List:      Right lower quadrant abdominal pain    Abdominal pain, right lower quadrant      Assessment:    Possible appendicitis, POA  Status post laparotomy with ileocecectomy   Status post   Right lower quadrant abdominal pain, POA    Plan:    Possible appendicitis  Status post laparotomy with ileocecectomy  -Dr. Miranda performed a diagnostic laparoscopy converted to laparotomy with ileocecectomy on " .  -Pain control PRN  -Continue IV fluids for now   -Zosyn continued  -Encouraged ambulation as patient is up ad ana  -Passing flatus at time of exam with belching, no BM  -Tolerating clears but reports continued nausea  -Encourage incentive spirometer     Status post   -Dr. Otero consulted for possible wound dehiscence  -Noted  site appeared healthy with no evidence of infection   -Recommended leave  site open to air, avoid manipulation and continue antibiotics   -Follow-up on wound cultures from  site, which has been with no growth    DVT Prophylaxis: SCDs  Code Status: Full Code  Diet: Clears  Discharge Plan: Home in 1-2 days    Beti Méndez, APRN  23  12:12 EST    Dictated utilizing Dragon dictation.

## 2023-12-05 NOTE — PLAN OF CARE
Problem: Adult Inpatient Plan of Care  Goal: Plan of Care Review  Outcome: Ongoing, Progressing  Goal: Patient-Specific Goal (Individualized)  Outcome: Ongoing, Progressing  Goal: Absence of Hospital-Acquired Illness or Injury  Outcome: Ongoing, Progressing  Intervention: Identify and Manage Fall Risk  Recent Flowsheet Documentation  Taken 12/5/2023 0200 by Berta Betancourt RN  Safety Promotion/Fall Prevention:   assistive device/personal items within reach   activity supervised   clutter free environment maintained   safety round/check completed  Taken 12/5/2023 0000 by Berta Betancourt RN  Safety Promotion/Fall Prevention:   activity supervised   assistive device/personal items within reach   clutter free environment maintained   safety round/check completed  Taken 12/4/2023 2200 by Berta Betancourt RN  Safety Promotion/Fall Prevention:   activity supervised   assistive device/personal items within reach   clutter free environment maintained   safety round/check completed  Taken 12/4/2023 1945 by Berta Betancourt RN  Safety Promotion/Fall Prevention:   activity supervised   assistive device/personal items within reach   clutter free environment maintained   safety round/check completed  Intervention: Prevent Skin Injury  Recent Flowsheet Documentation  Taken 12/5/2023 0200 by Berta Betancourt RN  Body Position:   position changed independently   supine, legs elevated  Taken 12/5/2023 0000 by Berta Betancourt RN  Body Position:   position changed independently   supine, legs elevated  Taken 12/4/2023 2200 by Berta Betancourt RN  Body Position:   position changed independently   supine, legs elevated  Taken 12/4/2023 1945 by Berta Betancourt RN  Body Position:   position changed independently   sitting up in bed  Skin Protection:   adhesive use limited   tubing/devices free from skin contact   incontinence pads utilized  Intervention: Prevent and Manage VTE (Venous Thromboembolism) Risk  Recent  Flowsheet Documentation  Taken 12/5/2023 0200 by Berta Betancourt RN  Activity Management: activity encouraged  Taken 12/5/2023 0000 by Berta Betancourt RN  Activity Management: activity encouraged  Taken 12/4/2023 2132 by Berta Betancourt RN  Activity Management: ambulated outside room  Taken 12/4/2023 1945 by Berta Betancourt RN  Activity Management: activity encouraged  Intervention: Prevent Infection  Recent Flowsheet Documentation  Taken 12/5/2023 0200 by Berta Betancourt RN  Infection Prevention: environmental surveillance performed  Taken 12/5/2023 0000 by Berta Betancourt RN  Infection Prevention: environmental surveillance performed  Taken 12/4/2023 2200 by Berta Betancourt RN  Infection Prevention: environmental surveillance performed  Taken 12/4/2023 1945 by Berta Betancourt RN  Infection Prevention: environmental surveillance performed  Goal: Optimal Comfort and Wellbeing  Outcome: Ongoing, Progressing  Goal: Readiness for Transition of Care  Outcome: Ongoing, Progressing     Problem: Pain Acute  Goal: Acceptable Pain Control and Functional Ability  Outcome: Ongoing, Progressing  Intervention: Prevent or Manage Pain  Recent Flowsheet Documentation  Taken 12/5/2023 0200 by Berta Betancourt RN  Medication Review/Management: medications reviewed  Taken 12/5/2023 0000 by Berta Betancourt RN  Medication Review/Management: medications reviewed  Taken 12/4/2023 2200 by Berta Betancourt RN  Medication Review/Management: medications reviewed  Taken 12/4/2023 1945 by Berta Betancourt RN  Medication Review/Management: medications reviewed     Problem: Impaired Wound Healing  Goal: Optimal Wound Healing  Outcome: Ongoing, Progressing  Intervention: Promote Wound Healing  Recent Flowsheet Documentation  Taken 12/5/2023 0200 by Berta Betancourt RN  Activity Management: activity encouraged  Taken 12/5/2023 0000 by Berta Betancourt RN  Activity Management: activity encouraged  Taken 12/4/2023  2132 by Berta Betancourt RN  Activity Management: ambulated outside room  Taken 12/4/2023 1945 by Berta Betancourt RN  Activity Management: activity encouraged     Problem: Bowel Motility Impaired (Surgery Nonspecified)  Goal: Effective Bowel Elimination  Outcome: Ongoing, Progressing     Problem: Fluid and Electrolyte Imbalance (Surgery Nonspecified)  Goal: Fluid and Electrolyte Balance  Outcome: Ongoing, Progressing     Problem: Pain (Surgery Nonspecified)  Goal: Acceptable Pain Control  Outcome: Ongoing, Progressing     Problem: Postoperative Nausea and Vomiting (Surgery Nonspecified)  Goal: Nausea and Vomiting Relief  Outcome: Ongoing, Progressing     Problem: Respiratory Compromise (Surgery Nonspecified)  Goal: Effective Oxygenation and Ventilation  Outcome: Ongoing, Progressing  Intervention: Optimize Oxygenation and Ventilation  Recent Flowsheet Documentation  Taken 12/5/2023 0200 by Berta Betancourt RN  Head of Bed (HOB) Positioning: HOB elevated  Taken 12/5/2023 0000 by Berta Betancourt RN  Head of Bed (HOB) Positioning: HOB elevated  Taken 12/4/2023 2200 by Berta Betancourt RN  Head of Bed (HOB) Positioning: HOB elevated  Taken 12/4/2023 1945 by Berta Betancourt RN  Head of Bed (HOB) Positioning: HOB elevated   Goal Outcome Evaluation:      Plan of care reviewed with patient. Patient has not rested much tonight. Patient did ambulate the raines prior to bed. Patient + flatus and belching but denies a bowel movement. Patient has had nausea with clears but no vomiting. Nausea has been somewhat controlled with PRN Zofran and Compazine. No other significant changes this shift.

## 2023-12-05 NOTE — OUTREACH NOTE
[facility]  Postpartum Check-In    Questions/Answers      Flowsheet Row Responses   Visit Setting In Person   Best Method for Contacting Cell   OB Discharge Note Reviewed  Reviewed   OB Discharge Navigator Reviewed  Reviewed   OB Discharge Medications Reviewed  Reviewed    discharged home with mother? Yes   Verbalized Emotional State Acceptance   Family/Support Network Family, Spouse, Mother   Level of Involvement in Care Attentive, Supportive   Do you feel comfortable in your relationship with your baby? Yes   Have members of your household adjusted to your baby? Yes   Is the baby's father supportive and/or involved with the baby? Yes   How does your partner feel about the baby? Happy   Do you feel safe at home, school and work? Yes   Are you in a relationship with someone who threatens you or hurts you? No   Do you have the resources to keep yourself and your baby healthy and safe? Yes   Lochia (per patient report) Alba   Amount Scant   Number of pads per day 1   Lochia Odor None   Is patient breastfeeding? No   How is breast suppression going? Pt denies any problems   Postpartum Depression Screening Education Education Provided   Doctor Appointments: Education Provided   Breastfeeding Education Declined   Family Planning Education Education Provided   Postpartum Care Education Education Provided   S & S to report Education Provided   Followup Appointments Made Yes   Well Child Visit Appointments Made Yes   Did you complete the visit? Yes   Were there any specific concerns? No   Umbilical Cord No reported signs or symptoms   Was the baby circumcised? Yes   Circumcision care and signs/symptoms to report Reviewed   Infant Feeding Method Formula   Formula PO (mL) 90   Formula/Expressed Milk frequency of feedings: 3-4 hours   Number of wet diapers x 24 hours 6   Last BM x 24 hours 4   Emesis (Unmeasured Occurence) 0   What safe sleep surface is available? Bassinet   Are there stuffed animals, toys,  pillows, quilts, blankets, wedges, positioners, bumpers or other loose bedding in the infant's sleeping environment? No   Where does the baby usually sleep? Bassinet   Does the baby ever share a sleep surface with a sibling, adult or pet? No   Does the baby ever share a sleep surface in a bed, couch, recliner or other? No   What position do you place your baby to sleep for naps? Back   What position do you place your baby to sleep at night Back   Are you and/or other caregivers smoking inside or outside the baby's home? No   Is the infant dressed appropiately for the temperature of the home? Yes   Do you use a clean, dry pacifier that is not attached to a string or stuffed animal? Yes            Review of Systems    Most Recent [place]  Depression Scale Score (EPDS)    Performed by a clinician: 0 (2023  1:47 PM)    Received via [employer] questionnaire:  ()       Loree RODRIGUEZ  Maternity Nurse Navigator    2023, 13:48 EST

## 2023-12-05 NOTE — PROGRESS NOTES
LOS: 2 days   Patient Care Team:  Jaqueline Hays APRN as PCP - General (Family Medicine)  Alma Rosa Birch, RN as Nurse Navigator (Obstetrics)       Chief Complaint: POD3 diagnostic laparoscopy converted to laparotomy with ileocecectomy    HPI: Patient seen and examined at bedside. No acute events overnight. She states her pain and nausea are improved today. She has been ambulating and taking in small amounts of liquids. Most PO intake gives her nausea, however. She denies flatus or bowel movements as of yet. Afebrile and vital signs have been stable.     ROS:    Vital Signs  Temp:  [97.9 °F (36.6 °C)-98.6 °F (37 °C)] 98.4 °F (36.9 °C)  Heart Rate:  [79-88] 79  Resp:  [18] 18  BP: (119-137)/(66-80) 119/75    Physical Exam:     General Appearance:  Alert and cooperative, not in any acute distress.   Respiratory/Lungs:   Breath sounds heard bilaterally equally.  No crackles or wheezing. No Pleural rub or bronchial breathing. Normal respiratory effort.    Cardiovascular/Heart:  Normal S1 and S2, no murmur. No edema   GI/Abdomen:   Soft, obese, appropriately tender to palpation, non-distended, hypoactive bowel sounds, Prevena in place with scant drainage, remaining incisions are clean, dry, and intact.                 Musculoskeletal/ Extremities:   Moves all extremities well   Skin: No bleeding, bruising or rash, no induration   Psychiatric : Alert and oriented ×3.  No depression or anxiety    Neurologic: Cranial nerves 2 - 12 grossly intact, sensation intact, Motor power is normal and equal bilaterally.     Results Review:       Lab Results (last 24 hours)       Procedure Component Value Units Date/Time    TISSUE EXAM, [facility]) [[id number]] Collected: 12/02/23 1652    Specimen: Tissue from Large Intestine, Cecum Updated: 12/05/23 1014     Reference Lab Report --     [facility]  [address] [place] [address]  Phone: [phone] or [phone]  Fax:  "[phone]  Zheng Sheppard M.D., Medical Director    PATIENT NAME                           LABORATORY NO.  GERSON PE[facility]A.         [id number]  [id number]                         AGE              SEX  SSN           CLIENT REF #  [facility]            33      1990  F    [id number]   [id number]    [address] [place] [address] BY-PASS                REQUESTING M.KERRIE.     ATTENDING M.KERRIE.     COPY TO.  [address]                        JORGE, CASSIA     FAISAL, LUMA [place]O, [place] [address]                 DATE COLLECTED      DATE RECEIVED      DATE REPORTED  2023    DIAGNOSIS:  COLON, RESECTION, CECUM AND SMALL BOWEL:  Colonic mucosa with transmural ulceration and perforation with associated  serositis and abscess formation, see comment  No evidence of dysplasia or malignancy identified    COMMENT:  Focal foreign body material is present.  Due to the significant  inflammation and associated rupture determination of etiology is difficult.  Clinical and endoscopic correlation is required.    CLINICAL HISTORY:  Appendicitis    SPECIMENS RECEIVED:  COLON, RESECTION , CECUM AND SMALL BOWEL    MICROSCOPIC DESCRIPTION:  Tissue blocks are prepared and slides are examined microscopically on all  specimens. See diagnosis for details.    Professional interpretation rendered by Kalpana Watkins D.O., F.C.A.P. at  [facility], [address] [place] [address].    GROSS DESCRIPTION:  Received in formalin labeled \"cecum and small bowel history of open  appendectomy 22 years ago\" is a 4 cm intact, unopened portion of right colon  with attached cecum and 5.5 cm terminal ileum.  There is a moderate amount of  yellow, lobular pericolonic fat.  Surrounding and partially encasing the cecum  are dense, ragged adhesions and exudate.  The appendix is surgically absent and  there is a 0.5 cm perforation involving the cecum, adjacent to the " appendiceal  orifice.  The surrounding mucosa is hemorrhagic.  The remaining mucosa is  pink-tan and mildly edematous.  No polyps, ulcers or invasive lesions are  present.  No enlarged lymph nodes or mesenteric masses are appreciated.  Additionally received free-floating in the specimen container is a 6 x 4.2 x 2.2  cm unoriented portion of yellow-red, mildly hemorrhagic and indurated  pericolonic fat.  Representative sections are submitted in 5 blocks as follows:    A1: Proximal mucosal margin en face  A2: Distal mucosal margin en face  A3-A4: Perforation  A5: Free-floating pericolonic fat, represented  HDM        REVIEWED, DIAGNOSED AND ELECTRONICALLY  SIGNED BY:    Kalpana Watkins D.O., F.C.A.P.  CPT CODES:  50971      Basic Metabolic Panel [[id number]]  (Abnormal) Collected: 12/05/23 0525    Specimen: Blood Updated: 12/05/23 0611     Glucose 109 mg/dL      BUN 10 mg/dL      Creatinine 0.58 mg/dL      Sodium 136 mmol/L      Potassium 4.1 mmol/L      Chloride 102 mmol/L      CO2 24.9 mmol/L      Calcium 8.8 mg/dL      BUN/Creatinine Ratio 17.2     Anion Gap 9.1 mmol/L      eGFR 122.7 mL/min/1.73     Narrative:      GFR Normal >60  Chronic Kidney Disease <60  Kidney Failure <15      CBC & Differential [[id number]]  (Abnormal) Collected: 12/05/23 0525    Specimen: Blood Updated: 12/05/23 0601    Narrative:      The following orders were created for panel order CBC & Differential.  Procedure                               Abnormality         Status                     ---------                               -----------         ------                     CBC Auto Differential[[id number]]        Abnormal            Final result                 Please view results for these tests on the individual orders.    CBC Auto Differential [[id number]]  (Abnormal) Collected: 12/05/23 0525    Specimen: Blood Updated: 12/05/23 0601     WBC 9.78 10*3/mm3      RBC 3.48 10*6/mm3      Hemoglobin 9.2 g/dL      Hematocrit 30.0 %      MCV  86.2 fL      MCH 26.4 pg      MCHC 30.7 g/dL      RDW 13.8 %      RDW-SD 43.4 fl      MPV 10.1 fL      Platelets 295 10*3/mm3      Neutrophil % 81.2 %      Lymphocyte % 9.4 %      Monocyte % 6.2 %      Eosinophil % 1.1 %      Basophil % 0.4 %      Immature Grans % 1.7 %      Neutrophils, Absolute 7.93 10*3/mm3      Lymphocytes, Absolute 0.92 10*3/mm3      Monocytes, Absolute 0.61 10*3/mm3      Eosinophils, Absolute 0.11 10*3/mm3      Basophils, Absolute 0.04 10*3/mm3      Immature Grans, Absolute 0.17 10*3/mm3      nRBC 0.0 /100 WBC                 Assessment & Plan       Right lower quadrant abdominal pain    Abdominal pain, right lower quadrant    Patient is POD3 diagnostic laparoscopy converted to laparotomy with ileocecectomy. She continues to show improvements every day. White blood cell count normalized today. Patient's pathology did return which I discussed with the pathologist and patient and family. It showed significant transmural inflammation of the cecum with associated abscess. Not concerning for inflammatory bowel disease or malignancy. The appendix was surgically absent. Unknown as to what caused the inflammatory process in the first place, but I would recommend a colonoscopy in the next few months. The patient still has not had return of bowel function and a post operative ileus is expected secondary to significant bowel manipulation and lysis of adhesions. She should continue to take in clear liquids as tolerated and ambulate as much as possible. Patient and family express understanding and all of their questions were answered.     Kelly Miranda DO  12/05/23  11:02 EST

## 2023-12-06 ENCOUNTER — READMISSION MANAGEMENT (OUTPATIENT)
Dept: CALL CENTER | Facility: HOSPITAL | Age: 33
End: 2023-12-06
Payer: MEDICAID

## 2023-12-06 VITALS
DIASTOLIC BLOOD PRESSURE: 62 MMHG | OXYGEN SATURATION: 99 % | WEIGHT: 266.54 LBS | HEART RATE: 72 BPM | HEIGHT: 66 IN | BODY MASS INDEX: 42.84 KG/M2 | SYSTOLIC BLOOD PRESSURE: 120 MMHG | RESPIRATION RATE: 16 BRPM | TEMPERATURE: 98.4 F

## 2023-12-06 LAB
ANION GAP SERPL CALCULATED.3IONS-SCNC: 8 MMOL/L (ref 5–15)
BASOPHILS # BLD AUTO: 0.03 10*3/MM3 (ref 0–0.2)
BASOPHILS NFR BLD AUTO: 0.4 % (ref 0–1.5)
BUN SERPL-MCNC: 8 MG/DL (ref 6–20)
BUN/CREAT SERPL: 12.9 (ref 7–25)
CALCIUM SPEC-SCNC: 8.9 MG/DL (ref 8.6–10.5)
CHLORIDE SERPL-SCNC: 102 MMOL/L (ref 98–107)
CO2 SERPL-SCNC: 27 MMOL/L (ref 22–29)
CREAT SERPL-MCNC: 0.62 MG/DL (ref 0.57–1)
DEPRECATED RDW RBC AUTO: 41.7 FL (ref 37–54)
EGFRCR SERPLBLD CKD-EPI 2021: 120.8 ML/MIN/1.73
EOSINOPHIL # BLD AUTO: 0.29 10*3/MM3 (ref 0–0.4)
EOSINOPHIL NFR BLD AUTO: 3.9 % (ref 0.3–6.2)
ERYTHROCYTE [DISTWIDTH] IN BLOOD BY AUTOMATED COUNT: 13.6 % (ref 12.3–15.4)
GLUCOSE SERPL-MCNC: 93 MG/DL (ref 65–99)
HCT VFR BLD AUTO: 28.4 % (ref 34–46.6)
HGB BLD-MCNC: 8.8 G/DL (ref 12–15.9)
IMM GRANULOCYTES # BLD AUTO: 0.28 10*3/MM3 (ref 0–0.05)
IMM GRANULOCYTES NFR BLD AUTO: 3.8 % (ref 0–0.5)
LYMPHOCYTES # BLD AUTO: 1.54 10*3/MM3 (ref 0.7–3.1)
LYMPHOCYTES NFR BLD AUTO: 20.9 % (ref 19.6–45.3)
MCH RBC QN AUTO: 26 PG (ref 26.6–33)
MCHC RBC AUTO-ENTMCNC: 31 G/DL (ref 31.5–35.7)
MCV RBC AUTO: 84 FL (ref 79–97)
MONOCYTES # BLD AUTO: 0.55 10*3/MM3 (ref 0.1–0.9)
MONOCYTES NFR BLD AUTO: 7.5 % (ref 5–12)
NEUTROPHILS NFR BLD AUTO: 4.67 10*3/MM3 (ref 1.7–7)
NEUTROPHILS NFR BLD AUTO: 63.5 % (ref 42.7–76)
NRBC BLD AUTO-RTO: 0 /100 WBC (ref 0–0.2)
PLATELET # BLD AUTO: 311 10*3/MM3 (ref 140–450)
PMV BLD AUTO: 9.6 FL (ref 6–12)
POTASSIUM SERPL-SCNC: 3.7 MMOL/L (ref 3.5–5.2)
RBC # BLD AUTO: 3.38 10*6/MM3 (ref 3.77–5.28)
SODIUM SERPL-SCNC: 137 MMOL/L (ref 136–145)
WBC NRBC COR # BLD AUTO: 7.36 10*3/MM3 (ref 3.4–10.8)

## 2023-12-06 PROCEDURE — 25010000002 ONDANSETRON PER 1 MG: Performed by: STUDENT IN AN ORGANIZED HEALTH CARE EDUCATION/TRAINING PROGRAM

## 2023-12-06 PROCEDURE — 25010000002 MORPHINE PER 10 MG: Performed by: INTERNAL MEDICINE

## 2023-12-06 PROCEDURE — 25010000002 PROCHLORPERAZINE 10 MG/2ML SOLUTION: Performed by: INTERNAL MEDICINE

## 2023-12-06 PROCEDURE — 99239 HOSP IP/OBS DSCHRG MGMT >30: CPT | Performed by: NURSE PRACTITIONER

## 2023-12-06 PROCEDURE — 80048 BASIC METABOLIC PNL TOTAL CA: CPT | Performed by: NURSE PRACTITIONER

## 2023-12-06 PROCEDURE — 25810000003 LACTATED RINGERS PER 1000 ML: Performed by: STUDENT IN AN ORGANIZED HEALTH CARE EDUCATION/TRAINING PROGRAM

## 2023-12-06 PROCEDURE — 99024 POSTOP FOLLOW-UP VISIT: CPT | Performed by: STUDENT IN AN ORGANIZED HEALTH CARE EDUCATION/TRAINING PROGRAM

## 2023-12-06 PROCEDURE — 85025 COMPLETE CBC W/AUTO DIFF WBC: CPT | Performed by: NURSE PRACTITIONER

## 2023-12-06 PROCEDURE — 25010000002 PIPERACILLIN SOD-TAZOBACTAM PER 1 G: Performed by: FAMILY MEDICINE

## 2023-12-06 RX ORDER — HYDROCODONE BITARTRATE AND ACETAMINOPHEN 5; 325 MG/1; MG/1
1 TABLET ORAL EVERY 6 HOURS PRN
Qty: 10 TABLET | Refills: 0 | Status: SHIPPED | OUTPATIENT
Start: 2023-12-06

## 2023-12-06 RX ADMIN — HYDROCODONE BITARTRATE AND ACETAMINOPHEN 1 TABLET: 7.5; 325 TABLET ORAL at 11:35

## 2023-12-06 RX ADMIN — PROCHLORPERAZINE EDISYLATE 5 MG: 5 INJECTION INTRAMUSCULAR; INTRAVENOUS at 00:45

## 2023-12-06 RX ADMIN — HYDROCODONE BITARTRATE AND ACETAMINOPHEN 1 TABLET: 7.5; 325 TABLET ORAL at 04:47

## 2023-12-06 RX ADMIN — DICLOFENAC SODIUM 2 G: 10 GEL TOPICAL at 00:46

## 2023-12-06 RX ADMIN — ONDANSETRON 4 MG: 2 INJECTION INTRAMUSCULAR; INTRAVENOUS at 04:47

## 2023-12-06 RX ADMIN — SODIUM CHLORIDE, POTASSIUM CHLORIDE, SODIUM LACTATE AND CALCIUM CHLORIDE 100 ML/HR: 600; 310; 30; 20 INJECTION, SOLUTION INTRAVENOUS at 04:49

## 2023-12-06 RX ADMIN — PIPERACILLIN SODIUM AND TAZOBACTAM SODIUM 3.38 G: 3; .375 INJECTION, SOLUTION INTRAVENOUS at 04:48

## 2023-12-06 RX ADMIN — MORPHINE SULFATE 4 MG: 4 INJECTION, SOLUTION INTRAMUSCULAR; INTRAVENOUS at 00:56

## 2023-12-06 NOTE — DISCHARGE SUMMARY
[facility]   DISCHARGE SUMMARY      Name:  Lauren Carrasco   Age:  33 y.o.  Sex:  female  :  1990  MRN:  [id number]   Visit Number:  [id number]    Admission Date:  2023  Date of Discharge:  2023  Primary Care Physician:  Jaqueline Hays APRN    Important issues to note:    Admitted to the hospital with right lower quadrant pain 3.5 weeks post partum with concern for appendicitis.  Dr. Miranda was consulted on admission.  Taken to OR for diagnostic laparotomy with ileocecectomy on  with Dr. Miranda.  Dr. Otero (OB/GYN) was consulted for possible  wound dehiscence.  Recommended leave  site open to air, avoid manipulation and continue antibiotics. Antibiotics switched from cefazolin/metronidazole to Zosyn after discussing with general surgery and OB/GYN.   Patient encouraged to ambulate post op and bowel function slowly returned with bowel movements last night.  Diet was advanced from clears to GI soft without difficulty.  On day of discharge denies any nausea, vomiting, or pain.    Discussed with Dr. Miranda who is agreeable to discharge home today.  Patient given discharge instructions per Dr. Miranda.  Patient will go home with the Prevena and has been instructed to remove it when it loses suction or starts beeping and then leave her incision open to air. Weight lifting precautions were discussed. No swimming, soaking, or tub bathing for 2-3 weeks. Patient will see Dr. Miranda in one week for follow up.  No antibiotics needed at discharge per surgery.  Strict return to hospital instructions with any fevers, increased pain, nausea/vomiting, or cessation of bowel movements.    Discharge Diagnoses:     Abdominal Wall Abscess, POA  Status post laparotomy with ileocecectomy   Status post   Right lower quadrant abdominal pain, POA    Problem List:     Active Hospital Problems    Diagnosis  POA    **Right lower quadrant  abdominal pain [R10.31]  Yes    Abdominal pain, right lower quadrant [R10.31]  Yes    Hx of  section [Z98.891]  Not Applicable      Resolved Hospital Problems   No resolved problems to display.     Presenting Problem:    Chief Complaint   Patient presents with    Abdominal Pain      Consults:     Consulting Physician(s)         Provider   Role Specialty     Amrit Otero MD  Consulting Physician Obstetrics and Gynecology     Kelly Miranda DO  Consulting Physician General Surgery          Procedures Performed:    Procedure(s):  DIAGNOSTIC LAPAROSCOPY with lysis of adhesions, exploratory laparatomy with ileocecectomy    History of presenting illness/Hospital Course:    Patient is a 33 years old female with a past medical history of  3.5 weeks who presented to the ER with a chief complaint of abdominal pain.  Patient reports that she was doing well after her  until 3 days ago when she started having abdominal discomfort in her right lower quadrant that radiated to her back, patient then started having fever yesterday of 100.5 F and her pain became worse so she decided to come to the ER for evaluation.  Patient denies any vomiting or urinary symptoms.  Patient denies changes in her bowel habits.  Patient has some remaining mild incisional drainage at the site of her . Patient confirmed that her appendix ruptured when she was 11 years old and had appendectomy.  No other abdominal surgeries.     On ER evaluation, her vitals were stable and was afebrile.  Her workup was pretty much within acceptable range except for hemoglobin of 10.0.  WBC WNL.  Urinalysis negative for infection.  CT abdomen pelvis showed findings consistent with acute appendicitis, Dilated appendix measuring up to 2.2 cm in diameter, with adjacent fat stranding and small fluid. Appendicolith at the appendix base. Heterogeneous fluid collection anterior to the uterus, which could be due to infected fluid  or blood products. No evidence of active bleeding. Anterior abdominal wall  scar with no visible associated abscess.  Patient received Toradol.  Case discussed with Dr. Miranda with general surgery who recommended admission for observation, no indication for antibiotics at this time with plan on repeating CT scan for evaluation. Hospitalist consulted for admission.     Dr. Miranda performed a diagnostic laparoscopy converted to laparotomy with ileocecectomy on . Dr. Otero with GYN consulted for possible wound dehiscence, He thought that her  site appeared healthy with no evidence of infection. Recommended leave  site open to air, avoid manipulation and continue antibiotics. Antibiotics switched from Cefazolin/Metronidazole to Zosyn after discussing with general surgery and OB/GYN.    Patient continued on Zosyn post-operatively.  Diet was advanced to clears which patient tolerated and bowel function noted to return last night with bowel movements.  Diet advanced to GI soft today and patient tolerated well.  Patient denies any nausea or pain after eating.  Discussed with Dr. Miranda who is agreeable to discharge home today.  Patient given discharge instructions per Dr. Miranda.  Patient will go home with the Prevena and has been instructed to remove it when it loses suction or starts beeping and then leave her incision open to air. Weight lifting precautions were discussed. No swimming, soaking, or tub bathing for 2-3 weeks. Patient will see Dr. Miranda in one week for follow up.  No antibiotics needed at discharge per surgery.  Strict return to hospital instructions with any fevers, increased pain, nausea/vomiting, or cessation of bowel movements.       Vital Signs:    Temp:  [98 °F (36.7 °C)-98.6 °F (37 °C)] 98.4 °F (36.9 °C)  Heart Rate:  [61-72] 72  Resp:  [16] 16  BP: (120-145)/(62-79) 120/62    Physical Exam:    General Appearance:  Alert and cooperative, pleasant young female, no  acute distress on exam   Head:  Atraumatic and normocephalic.   Eyes: Conjunctivae and sclerae normal, no icterus. No pallor.   Ears:  Ears with no abnormalities noted.   Throat: No oral lesions, no thrush, oral mucosa moist.   Neck: Supple, trachea midline   Back:   No kyphoscoliosis present. No tenderness to palpation.   Lungs:   Breath sounds heard bilaterally equally.  No crackles or wheezing. Unlabored on room air   Heart:  Normal S1 and S2, no murmur, no gallop, no rub. No JVD.   Abdomen:   Normal bowel sounds, no masses, no organomegaly. Soft, expected tenderness post-op, vertical wound with wound vac in place with dressing CDI, Csection wound with no evidence of infection, lap sites noted without s/s of infection    Extremities: Supple, no edema, no cyanosis, no clubbing.   Pulses: Pulses palpable bilaterally.   Skin: No bleeding or rash.   Neurologic: Alert and oriented x 3. No facial asymmetry. Moves all four limbs. No tremors.     Pertinent Lab Results:     Results from last 7 days   Lab Units 12/06/23 0526 12/05/23 0525 12/04/23  0531 12/03/23  0553 12/02/23  0308   SODIUM mmol/L 137 136 135*   < > 141   POTASSIUM mmol/L 3.7 4.1 4.1   < > 3.8   CHLORIDE mmol/L 102 102 101   < > 105   CO2 mmol/L 27.0 24.9 23.0   < > 24.8   BUN mg/dL 8 10 9   < > 14   CREATININE mg/dL 0.62 0.58 0.57   < > 0.87   CALCIUM mg/dL 8.9 8.8 9.0   < > 9.3   BILIRUBIN mg/dL  --   --   --   --  0.4   ALK PHOS U/L  --   --   --   --  104   ALT (SGPT) U/L  --   --   --   --  23   AST (SGOT) U/L  --   --   --   --  16   GLUCOSE mg/dL 93 109* 117*   < > 98    < > = values in this interval not displayed.     Results from last 7 days   Lab Units 12/06/23 0526 12/05/23  0525 12/04/23 0531   WBC 10*3/mm3 7.36 9.78 12.21*   HEMOGLOBIN g/dL 8.8* 9.2* 10.3*   HEMATOCRIT % 28.4* 30.0* 32.8*   PLATELETS 10*3/mm3 311 295 288         Results from last 7 days   Lab Units 12/02/23  0308   HSTROP T ng/L <6             Results from last 7 days    Lab Units 23  0308   LIPASE U/L 13         Results from last 7 days   Lab Units 23  0418   WOUNDCX  Light growth (2+) Actinomyces turicensis*  Scant growth (1+) Normal Skin Noemí       Pertinent Radiology Results:    Imaging Results (All)       Procedure Component Value Units Date/Time    CT Abdomen Pelvis With Contrast [[id number]] Collected: 23     Updated: 23    Addenda:        ADDENDUM REPORT    ADDENDUM:  This report was discussed with DR. ZIYAD MONTGOMERY on Dec 02, 2023 04:52:00   EST.    Authenticated and Electronically Signed by Lazaro Rich MD on  2023 04:53:13 AM  Signed: 23 by Lazaro Rich MD    Narrative:      FINAL REPORT    TECHNIQUE:  null    CLINICAL HISTORY:  3 weeks post c section, fever, incision drainage    COMPARISON:  null    FINDINGS:  CT Abdomen and Pelvis W Contrast    COMPARISON: CT - ABDOMEN/PELVIS W/O CONTRAST - 2015 04:36 PM EDT    FINDINGS:    Normal liver.    Splenomegaly.    Left renal cyst. Unremarkable right kidney. No hydronephrosis.    Normal adrenal glands.    Normal pancreas.    No visible gallstones. No biliary dilatation.    No evidence of bowel obstruction or colitis.    Dilated appendix measuring up to 2.2 cm in diameter, with adjacent fat stranding and small fluid. Appendicolith at the appendix base.    Unremarkable bladder.     scar in the anterior uterus. There is a small heterogeneous fluid collection anterior to the uterus (for example series 4, image 82). No contrast blush.    No pneumoperitoneum.    No lymphadenopathy.    No acute fracture.    No abdominal aortic aneurysm.    Small fat containing umbilical hernia. Anterior abdominal wall  scar with no visible associated abscess.      Impression:      IMPRESSION:    Findings consistent with acute appendicitis.    Heterogeneous fluid collection anterior to the uterus, which could be due to infected fluid or blood products. No evidence of active  bleeding.    Nonemergent/incidental findings in the report.    Authenticated and Electronically Signed by Lazaro Rich MD on  12/02/2023 04:48:29 AM              Condition on Discharge:      Stable.    Code status during the hospital stay:    Code Status and Medical Interventions:   Ordered at: 12/02/23 0932     Code Status (Patient has no pulse and is not breathing):    CPR (Attempt to Resuscitate)     Medical Interventions (Patient has pulse or is breathing):    Full Support     Discharge Disposition:    Home or Self Care    Discharge Medications:       Discharge Medications        New Medications        Instructions Start Date   HYDROcodone-acetaminophen 5-325 MG per tablet  Commonly known as: NORCO   1 tablet, Oral, Every 6 Hours PRN             Changes to Medications        Instructions Start Date   Prenatal Vitamin 27-0.8 MG tablet  What changed: Another medication with the same name was removed. Continue taking this medication, and follow the directions you see here.   1 tablet, Oral, Daily             Continue These Medications        Instructions Start Date   Acetaminophen Extra Strength 500 MG tablet   1,000 mg, Oral, Every 8 Hours PRN      ferrous sulfate 325 (65 FE) MG tablet   325 mg, Oral, 2 Times Daily Before Meals      ibuprofen 800 MG tablet  Commonly known as: ADVIL,MOTRIN   800 mg, Oral, Every 8 Hours PRN             Discharge Diet:     Diet Instructions       Advance Diet As Tolerated -Target Diet: Advance very slowly as tolerated back to regular diet      Target Diet: Advance very slowly as tolerated back to regular diet          Activity at Discharge:     Activity Instructions       Activity as Tolerated            Follow-up Appointments:     Follow-up Information       Kelly Miranda, DO Follow up in 1 week(s).    Specialty: General Surgery  Contact information:  [facility] [address] [address] [phone]               Jaqueline Hays, ADAM .    Specialty: Family  Medicine  Contact information:  [facility]  [place] [address] [phone]                           Future Appointments   Date Time Provider Department Center   12/18/2023 10:00 AM Magaly Brown CNM [facility] Joan Méndez, APRN  12/06/23  15:02 EST    Time: I spent 35 minutes on this discharge activity which included: face-to-face encounter with the patient, reviewing the data in the system, coordination of the care with the nursing staff as well as consultants, documentation, and entering orders.     Dictated utilizing Dragon dictation.

## 2023-12-06 NOTE — DISCHARGE INSTRUCTIONS
1) Ice to wound x 24 hours  2) May shower, no swimming, soaking, or tub bathing for 2 weeks  3) Keep Prevena wound vac on until loses suction/starts beeping, then remove entire dressing, leave staples open to air as much as possible  4) Advance diet slowly at home  5) No heavy lifting greater than 10-15 lbs for 4 weeks  6) Do not drive while taking narcotic pain medications

## 2023-12-06 NOTE — CASE MANAGEMENT/SOCIAL WORK
Continued Stay Note   [place]     Patient Name: Lauren Carrasco  MRN: [id number]  Today's Date: 12/6/2023    Admit Date: 12/2/2023    Plan: home   Discharge Plan       Row Name 12/06/23 0904       Plan    Plan Comments Spoke to pt and her mother at bedside with her permission plan is to return arlene denies  any needs at this time                   Discharge Codes    No documentation.                 Expected Discharge Date and Time       Expected Discharge Date Expected Discharge Time    Dec 7, 2023               Lulú Rendon RN

## 2023-12-06 NOTE — PROGRESS NOTES
LOS: 3 days   Patient Care Team:  Jaquelien Hays APRN as PCP - General (Family Medicine)  Alma Rosa Birch, RN as Nurse Navigator (Obstetrics)       Chief Complaint: POD4 diagnostic laparoscopy converted to laparotomy with ileocecectomy    HPI: Patient seen and examined at bedside. No acute events overnight. She started having bowel movements yesterday evening and has had multiple since. States she is feeling better. Tolerating clear liquids and denies nausea and vomiting. Abdominal pain is well controlled. She has been ambulating.    Vital Signs  Temp:  [98 °F (36.7 °C)-98.8 °F (37.1 °C)] 98.4 °F (36.9 °C)  Heart Rate:  [61-74] 72  Resp:  [16] 16  BP: (120-145)/(62-79) 120/62    Physical Exam:     General Appearance:  Alert and cooperative, not in any acute distress.   Respiratory/Lungs:   Breath sounds heard bilaterally equally.  No crackles or wheezing. No Pleural rub or bronchial breathing. Normal respiratory effort.    Cardiovascular/Heart:  Normal S1 and S2, no murmur. No edema   GI/Abdomen:   Soft, obese, appropriately tender to palpation, active bowel sounds on auscultation, prevena in place with scant drainage, remaining incisions are clean, dry, and intact                Musculoskeletal/ Extremities:   Moves all extremities well   Skin: No bleeding, bruising or rash, no induration   Psychiatric : Alert and oriented ×3.  No depression or anxiety    Neurologic: Cranial nerves 2 - 12 grossly intact, sensation intact, Motor power is normal and equal bilaterally.     Results Review:       Lab Results (last 24 hours)       Procedure Component Value Units Date/Time    Basic Metabolic Panel [[id number]]  (Normal) Collected: 12/06/23 0526    Specimen: Blood Updated: 12/06/23 0606     Glucose 93 mg/dL      BUN 8 mg/dL      Creatinine 0.62 mg/dL      Sodium 137 mmol/L      Potassium 3.7 mmol/L      Chloride 102 mmol/L      CO2 27.0 mmol/L      Calcium 8.9 mg/dL      BUN/Creatinine Ratio 12.9     Anion  Gap 8.0 mmol/L      eGFR 120.8 mL/min/1.73     Narrative:      GFR Normal >60  Chronic Kidney Disease <60  Kidney Failure <15      CBC & Differential [[id number]]  (Abnormal) Collected: 12/06/23 0526    Specimen: Blood Updated: 12/06/23 0554    Narrative:      The following orders were created for panel order CBC & Differential.  Procedure                               Abnormality         Status                     ---------                               -----------         ------                     CBC Auto Differential[[id number]]        Abnormal            Final result                 Please view results for these tests on the individual orders.    CBC Auto Differential [[id number]]  (Abnormal) Collected: 12/06/23 0526    Specimen: Blood Updated: 12/06/23 0554     WBC 7.36 10*3/mm3      RBC 3.38 10*6/mm3      Hemoglobin 8.8 g/dL      Hematocrit 28.4 %      MCV 84.0 fL      MCH 26.0 pg      MCHC 31.0 g/dL      RDW 13.6 %      RDW-SD 41.7 fl      MPV 9.6 fL      Platelets 311 10*3/mm3      Neutrophil % 63.5 %      Lymphocyte % 20.9 %      Monocyte % 7.5 %      Eosinophil % 3.9 %      Basophil % 0.4 %      Immature Grans % 3.8 %      Neutrophils, Absolute 4.67 10*3/mm3      Lymphocytes, Absolute 1.54 10*3/mm3      Monocytes, Absolute 0.55 10*3/mm3      Eosinophils, Absolute 0.29 10*3/mm3      Basophils, Absolute 0.03 10*3/mm3      Immature Grans, Absolute 0.28 10*3/mm3      nRBC 0.0 /100 WBC     Wound Culture - Surgical Site, Abdominal Wall [[id number]]  (Abnormal) Collected: 12/02/23 0418    Specimen: Surgical Site from Abdominal Wall Updated: 12/05/23 1424     Wound Culture Light growth (2+) Actinomyces turicensis      Scant growth (1+) Normal Skin Noemí     Gram Stain Moderate (3+) WBCs seen      No No organisms seen    Narrative:                        Assessment & Plan       Right lower quadrant abdominal pain    Abdominal pain, right lower quadrant    Patient is POD3 diagnostic laparoscopy converted to laparotomy  with ileocecectomy. Patient has had return of bowel function and has been tolerating clears. Advance to GI soft diet. If patient continues to do well with this, she is surgically stable for discharge later this evening. Discontinue IV fluids and [place]. She will go home with the Prevena and has been instructed to remove it when it loses suction or starts beeping. She may then leave her incision open to air. Weight lifting precautions were discussed. No swimming, soaking, or tub bathing for 2-3 weeks. I will see her in the office in approximately 1 week.     Kelly Miranda DO  12/06/23  11:46 EST

## 2023-12-06 NOTE — CASE MANAGEMENT/SOCIAL WORK
Case Management Discharge Note           Provided Post Acute Provider List?: N/A  Provided Post Acute Provider Quality & Resource List?: N/A    Selected Continued Care - Admitted Since 12/2/2023       Destination    No services have been selected for the patient.                Durable Medical Equipment    No services have been selected for the patient.                Dialysis/Infusion    No services have been selected for the patient.                Home Medical Care    No services have been selected for the patient.                Therapy    No services have been selected for the patient.                Community Resources    No services have been selected for the patient.                Community & DME    No services have been selected for the patient.                    Selected Continued Care - Episodes Includes continued care and service providers with selected services from the active episodes listed below      [facility] Episode start date: 5/31/2023   There are no active outsourced providers for this episode.                 Transportation Services  Private: Car    Final Discharge Disposition Code: 01 - home or self-care

## 2023-12-06 NOTE — PAYOR COMM NOTE
"Lauren Carrasco (33 y.o. Female)       Date of Birth   1990    Social Security Number       [address]   [address] [facility] [address]    Home Phone   [phone]    MRN   [id number]       Christianity   None    Marital Status                               Admission Date   12/2/23    Admission Type   Emergency    Admitting Provider   Alonso Younger DO    Attending Provider   Joe Webb MD    Department, Room/Bed   [facility] TELEMETRY 4, 426/1       Discharge Date       Discharge Disposition       Discharge Destination                                 Attending Provider: Joe Webb MD    Allergies: No Known Allergies    Isolation: None   Infection: None   Code Status: CPR    Ht: 167.6 cm (66\")   Wt: 121 kg (266 lb 8.6 oz)    Admission Cmt: None   Principal Problem: Right lower quadrant abdominal pain [R10.31]                   Active Insurance as of 12/2/2023       Primary Coverage       Payor Plan Insurance Group Employer/Plan Group    [place] [id number]       Payor Plan Address Payor Plan Phone Number Payor Plan Fax Number Effective Dates    [employer] [address] [address] [phone]  3/1/2023 - None Entered    [facility] [id number]         Subscriber Name Subscriber Birth Date Member ID       LAUREN CARRASCO 1990 [id number]                     Emergency Contacts        (Rel.) Home Phone Work Phone Mobile Phone    Cal Carrasco (Spouse) -- -- [phone]    murtaza watson -- -- [phone]              Vital Signs (last day)       Date/Time Temp Temp src Pulse Resp BP Patient Position SpO2    12/06/23 0700 98.6 (37) Oral 67 16 145/71 Sitting 100    12/06/23 0333 98.2 (36.8) Oral 61 16 129/76 Lying 97    12/05/23 2334 98.4 (36.9) Oral 67 16 128/69 Lying 99    12/05/23 1922 98 (36.7) Oral 67 16 141/79 Lying 99    12/05/23 1500 98.8 (37.1) Oral 74 16 125/70 Lying 99    12/05/23 1100 98.8 (37.1) Oral " 78 16 130/59 Lying 98    12/05/23 0700 98.4 (36.9) Oral 79 18 119/75 Lying 98    12/05/23 0322 98.4 (36.9) Oral 82 18 130/66 Lying 97          Current Facility-Administered Medications   Medication Dose Route Frequency Provider Last Rate Last Admin    sennosides-docusate (PERICOLACE) 8.6-50 MG per tablet 2 tablet  2 tablet Oral BID Ruth, Kelly B, DO   2 tablet at 12/05/23 2038    And    polyethylene glycol (MIRALAX) packet 17 g  17 g Oral Daily PRN Ruth, Kelly B, DO        And    bisacodyl (DULCOLAX) EC tablet 5 mg  5 mg Oral Daily PRN Ruth, Kelly B, DO        And    bisacodyl (DULCOLAX) suppository 10 mg  10 mg Rectal Daily PRN Ruth, Kelly B, DO        Diclofenac Sodium (VOLTAREN) 1 % gel 2 g  2 g Topical TID PRN Joe Webb MD   2 g at 12/06/23 0046    HYDROcodone-acetaminophen (NORCO) 7.5-325 MG per tablet 1 tablet  1 tablet Oral Q6H PRN Ruth, Kelly B, DO   1 tablet at 12/06/23 0447    lactated ringers infusion  100 mL/hr Intravenous Continuous Ruth, Kelly B,  mL/hr at 12/06/23 0449 100 mL/hr at 12/06/23 0449    Magnesium Standard Dose Replacement - Follow Nurse / BPA Driven Protocol   Does not apply PRN Ruth, Kelly B, DO        melatonin tablet 5 mg  5 mg Oral Nightly PRN Ruth, Kelly B, DO   5 mg at 12/05/23 2246    morphine injection 4 mg  4 mg Intravenous Q4H PRN Alonso Younger DO   4 mg at 12/06/23 0056    And    naloxone (NARCAN) injection 0.4 mg  0.4 mg Intravenous Q5 Min PRN Alonso Younger DO        ondansetron (ZOFRAN) injection 4 mg  4 mg Intravenous Q6H PRN Ruth, Kelly B, DO   4 mg at 12/06/23 0447    Pharmacy to Dose Zosyn   Does not apply Continuous PRN Joe Webb MD        piperacillin-tazobactam (ZOSYN) 3.375 g in iso-osmotic dextrose 50 ml (premix)  3.375 g Intravenous Q8H Joe Webb MD   3.375 g at 12/06/23 0448    Potassium Replacement - Follow Nurse / BPA Driven Protocol   Does not apply PRN Kelly Miranda, DO         prochlorperazine (COMPAZINE) injection 5 mg  5 mg Intravenous Q6H PRN Alonso Younger DO   5 mg at 12/06/23 0045    Or    prochlorperazine (COMPAZINE) tablet 5 mg  5 mg Oral Q6H PRN Alonso Younger DO        Or    prochlorperazine (COMPAZINE) suppository 25 mg  25 mg Rectal Q12H PRN Alonso Younger DO        sodium chloride 0.9 % flush 10 mL  10 mL Intravenous PRN Kelly Miranda DO   10 mL at 12/05/23 0935         Orders (last 24 hrs)        Start     Ordered    12/06/23 0632  Apply Heat To Affected Area  Once        Comments: Okay to use K Pad    12/06/23 0631    12/06/23 0600  CBC & Differential  Morning Draw         12/05/23 1537    12/06/23 0600  Basic Metabolic Panel  Morning Draw         12/05/23 1537    12/06/23 0600  CBC Auto Differential  PROCEDURE ONCE         12/05/23 2202    12/03/23 2200  piperacillin-tazobactam (ZOSYN) 3.375 g in iso-osmotic dextrose 50 ml (premix)  Every 8 Hours         12/03/23 1422    12/03/23 1413  Pharmacy to Dose Zosyn  Continuous PRN         12/03/23 1413    12/03/23 1203  HYDROcodone-acetaminophen (NORCO) 7.5-325 MG per tablet 1 tablet  Every 6 Hours PRN         12/03/23 1204    12/03/23 1150  Diclofenac Sodium (VOLTAREN) 1 % gel 2 g  3 Times Daily PRN         12/03/23 1150    12/03/23 0101  morphine injection 4 mg  Every 4 Hours PRN        See Hyperspace for full Linked Orders Report.    12/03/23 0101    12/03/23 0101  naloxone (NARCAN) injection 0.4 mg  Every 5 Minutes PRN        See Hyperspace for full Linked Orders Report.    12/03/23 0101    12/02/23 2301  prochlorperazine (COMPAZINE) injection 5 mg  Every 6 Hours PRN        See Hyperspace for full Linked Orders Report.    12/02/23 2301    12/02/23 2301  prochlorperazine (COMPAZINE) tablet 5 mg  Every 6 Hours PRN        See Hyperspace for full Linked Orders Report.    12/02/23 2301    12/02/23 2301  prochlorperazine (COMPAZINE) suppository 25 mg  Every 12 Hours PRN        See Hyperspace for full Linked Orders  Report.    12/02/23 2301    12/02/23 1930  lactated ringers infusion  Continuous         12/02/23 1834    12/02/23 1800  Oral Care  2 Times Daily       12/02/23 0932    12/02/23 1200  Vital Signs  Every 4 Hours       12/02/23 0932    12/02/23 1030  sennosides-docusate (PERICOLACE) 8.6-50 MG per tablet 2 tablet  2 Times Daily        See Hyperspace for full Linked Orders Report.    12/02/23 0932    12/02/23 1000  Incentive Spirometry  Every 4 Hours While Awake       12/02/23 0932    12/02/23 0932  melatonin tablet 5 mg  Nightly PRN         12/02/23 0932    12/02/23 0932  Magnesium Standard Dose Replacement - Follow Nurse / BPA Driven Protocol  As Needed         12/02/23 0932    12/02/23 0932  Potassium Replacement - Follow Nurse / BPA Driven Protocol  As Needed         12/02/23 0932    12/02/23 0932  Ambulate Patient  Every Shift       12/02/23 0932    12/02/23 0930  polyethylene glycol (MIRALAX) packet 17 g  Daily PRN        See Hyperspace for full Linked Orders Report.    12/02/23 0932    12/02/23 0930  bisacodyl (DULCOLAX) EC tablet 5 mg  Daily PRN        See Hyperspace for full Linked Orders Report.    12/02/23 0932    12/02/23 0930  bisacodyl (DULCOLAX) suppository 10 mg  Daily PRN        See Hyperspace for full Linked Orders Report.    12/02/23 0932    12/02/23 0930  ondansetron (ZOFRAN) injection 4 mg  Every 6 Hours PRN         12/02/23 0932    12/02/23 0240  sodium chloride 0.9 % flush 10 mL  As Needed         12/02/23 0241    Unscheduled  Up With Assistance  As Needed       12/02/23 0932                     Physician Progress Notes (last 24 hours)        Kelly Miranda DO at 12/05/23 1102               LOS: 2 days   Patient Care Team:  Jaqueline Hays APRN as PCP - General (Family Medicine)  Alma Rosa Birch, RN as Nurse Navigator (Obstetrics)       Chief Complaint: POD3 diagnostic laparoscopy converted to laparotomy with ileocecectomy    HPI: Patient seen and examined at bedside. No acute events  overnight. She states her pain and nausea are improved today. She has been ambulating and taking in small amounts of liquids. Most PO intake gives her nausea, however. She denies flatus or bowel movements as of yet. Afebrile and vital signs have been stable.     ROS:    Vital Signs  Temp:  [97.9 °F (36.6 °C)-98.6 °F (37 °C)] 98.4 °F (36.9 °C)  Heart Rate:  [79-88] 79  Resp:  [18] 18  BP: (119-137)/(66-80) 119/75    Physical Exam:     General Appearance:  Alert and cooperative, not in any acute distress.   Respiratory/Lungs:   Breath sounds heard bilaterally equally.  No crackles or wheezing. No Pleural rub or bronchial breathing. Normal respiratory effort.    Cardiovascular/Heart:  Normal S1 and S2, no murmur. No edema   GI/Abdomen:   Soft, obese, appropriately tender to palpation, non-distended, hypoactive bowel sounds, Prevena in place with scant drainage, remaining incisions are clean, dry, and intact.                 Musculoskeletal/ Extremities:   Moves all extremities well   Skin: No bleeding, bruising or rash, no induration   Psychiatric : Alert and oriented ×3.  No depression or anxiety    Neurologic: Cranial nerves 2 - 12 grossly intact, sensation intact, Motor power is normal and equal bilaterally.     Results Review:       Lab Results (last 24 hours)       Procedure Component Value Units Date/Time    TISSUE EXAM, [facility]) [[id number]] Collected: 23 1652    Specimen: Tissue from Large Intestine, Cecum Updated: 23 1014     Reference Lab Report --     [facility]  [address] [place] [address]  Phone: [phone] or [phone]  Fax: [phone]  Zheng Sheppard M.D., Medical Director    PATIENT NAME                           LABORATORY NO.  GERSON PE[facility]A.         [id number]  [id number]                         AGE              SEX  SSN           CLIENT REF #  [facility]            33      1990  F     [id number]   [id number]    [address] [place] [address] BY-PASS                REQUESTING M.D.     ATTENDING M.D.     COPY TO.  [address]                        [facility] [address] [place], [place] [address]                 DATE COLLECTED      DATE RECEIVED      DATE REPORTED  12/02/2023 12/04/2023 12/05/2023    DIAGNOSIS:  COLON, RESECTION, CECUM AND SMALL BOWEL:  Colonic mucosa with transmural ulceration and perforation with associated  serositis and abscess formation, see comment  No evidence of dysplasia or malignancy identified    COMMENT:  Focal foreign body material is present.  Due to the significant  inflammation and associated rupture determination of etiology is difficult.  Clinical and endoscopic correlation is required.    CLINICAL HISTORY:  Appendicitis    SPECIMENS RECEIVED:  COLON, RESECTION , CECUM AND SMALL BOWEL    MICROSCOPIC DESCRIPTION:  Tissue blocks are prepared and slides are examined microscopically on all  specimens. See diagnosis for details.    Professional interpretation rendered by Kalpana Watkins D.O., F.C.A.P. at  [facility] [employer], [address] [place] [address].    GROSS DESCRIPTION:  Received in formalin labeled \"cecum and small bowel history of open  appendectomy 22 years ago\" is a 4 cm intact, unopened portion of right colon  with attached cecum and 5.5 cm terminal ileum.  There is a moderate amount of  yellow, lobular pericolonic fat.  Surrounding and partially encasing the cecum  are dense, ragged adhesions and exudate.  The appendix is surgically absent and  there is a 0.5 cm perforation involving the cecum, adjacent to the appendiceal  orifice.  The surrounding mucosa is hemorrhagic.  The remaining mucosa is  pink-tan and mildly edematous.  No polyps, ulcers or invasive lesions are  present.  No enlarged lymph nodes or mesenteric masses are appreciated.  Additionally received free-floating in the specimen container is a 6 x 4.2 x 2.2  cm " unoriented portion of yellow-red, mildly hemorrhagic and indurated  pericolonic fat.  Representative sections are submitted in 5 blocks as follows:    A1: Proximal mucosal margin en face  A2: Distal mucosal margin en face  A3-A4: Perforation  A5: Free-floating pericolonic fat, represented  HDM        REVIEWED, DIAGNOSED AND ELECTRONICALLY  SIGNED BY:    Kalpana Watkins D.O., GABRIEL  CPT CODES:  75474      Basic Metabolic Panel [[id number]]  (Abnormal) Collected: 12/05/23 0525    Specimen: Blood Updated: 12/05/23 0611     Glucose 109 mg/dL      BUN 10 mg/dL      Creatinine 0.58 mg/dL      Sodium 136 mmol/L      Potassium 4.1 mmol/L      Chloride 102 mmol/L      CO2 24.9 mmol/L      Calcium 8.8 mg/dL      BUN/Creatinine Ratio 17.2     Anion Gap 9.1 mmol/L      eGFR 122.7 mL/min/1.73     Narrative:      GFR Normal >60  Chronic Kidney Disease <60  Kidney Failure <15      CBC & Differential [[id number]]  (Abnormal) Collected: 12/05/23 0525    Specimen: Blood Updated: 12/05/23 0601    Narrative:      The following orders were created for panel order CBC & Differential.  Procedure                               Abnormality         Status                     ---------                               -----------         ------                     CBC Auto Differential[[id number]]        Abnormal            Final result                 Please view results for these tests on the individual orders.    CBC Auto Differential [[id number]]  (Abnormal) Collected: 12/05/23 0525    Specimen: Blood Updated: 12/05/23 0601     WBC 9.78 10*3/mm3      RBC 3.48 10*6/mm3      Hemoglobin 9.2 g/dL      Hematocrit 30.0 %      MCV 86.2 fL      MCH 26.4 pg      MCHC 30.7 g/dL      RDW 13.8 %      RDW-SD 43.4 fl      MPV 10.1 fL      Platelets 295 10*3/mm3      Neutrophil % 81.2 %      Lymphocyte % 9.4 %      Monocyte % 6.2 %      Eosinophil % 1.1 %      Basophil % 0.4 %      Immature Grans % 1.7 %      Neutrophils, Absolute 7.93 10*3/mm3       Lymphocytes, Absolute 0.92 10*3/mm3      Monocytes, Absolute 0.61 10*3/mm3      Eosinophils, Absolute 0.11 10*3/mm3      Basophils, Absolute 0.04 10*3/mm3      Immature Grans, Absolute 0.17 10*3/mm3      nRBC 0.0 /100 WBC                 Assessment & Plan       Right lower quadrant abdominal pain    Abdominal pain, right lower quadrant    Patient is POD3 diagnostic laparoscopy converted to laparotomy with ileocecectomy. She continues to show improvements every day. White blood cell count normalized today. Patient's pathology did return which I discussed with the pathologist and patient and family. It showed significant transmural inflammation of the cecum with associated abscess. Not concerning for inflammatory bowel disease or malignancy. The appendix was surgically absent. Unknown as to what caused the inflammatory process in the first place, but I would recommend a colonoscopy in the next few months. The patient still has not had return of bowel function and a post operative ileus is expected secondary to significant bowel manipulation and lysis of adhesions. She should continue to take in clear liquids as tolerated and ambulate as much as possible. Patient and family express understanding and all of their questions were answered.     Kelly Miranda DO  23  11:02 EST      Electronically signed by Kelly Miranda DO at 23 1112       Beti Méndez APRN at 23 1030              [facility]    PROGRESS NOTE    Name:  Lauren Carrasco   Age:  33 y.o.  Sex:  female  :  1990  MRN:  [id number]   Visit Number:  [id number]  Admission Date:  2023  Date Of Service:  23  Primary Care Physician:  Jaqueline Hays APRN     LOS: 2 days :    Chief Complaint:      Abdominal Pain    Subjective:    Patient seen and examined at bedside this morning.  Patient is pleasant in conversation.  States she is ok, and did not need IV pain medication  overnight.  States she had flatus yesterday, no bowel movement.  Moving around in room as able.  Discussed using incentive spirometer and walking hallway today.    Hospital Course:    Patient is a 33 years old female with a past medical history of  3.5 weeks who presented to the ER with a chief complaint of abdominal pain.  Patient reports that she was doing well after her  until 3 days ago when she started having abdominal discomfort in her right lower quadrant that radiated to her back, patient then started having fever yesterday of 100.5 F and her pain became worse so she decided to come to the ER for evaluation.  Patient denies any vomiting or urinary symptoms.  Patient denies changes in her bowel habits.  Patient has some remaining mild incisional drainage at the site of her . Patient confirmed that her appendix ruptured when she was 11 years old and had appendectomy.  No other abdominal surgeries.     On ER evaluation, her vitals were stable and was afebrile.  Her workup was pretty much within acceptable range except for hemoglobin of 10.0.  WBC WNL.  Urinalysis negative for infection.  CT abdomen pelvis showed findings consistent with acute appendicitis, Dilated appendix measuring up to 2.2 cm in diameter, with adjacent fat stranding and small fluid. Appendicolith at the appendix base. Heterogeneous fluid collection anterior to the uterus, which could be due to infected fluid or blood products. No evidence of active bleeding. Anterior abdominal wall  scar with no visible associated abscess.  Patient received Toradol.  Case discussed with Dr. Miranda with general surgery who recommended admission for observation, no indication for antibiotics at this time with plan on repeating CT scan for evaluation. Hospitalist consulted for admission.     Dr. Miranda performed a diagnostic laparoscopy converted to laparotomy with ileocecectomy on . Dr. Otero with GYN consulted for  possible wound dehiscence, He thought that her  site appeared healthy with no evidence of infection. Recommended leave  site open to air, avoid manipulation and continue antibiotics. Antibiotics switched from cefazolin/metronidazole to Zosyn after discussing with general surgery and OB/GYN.    Review of Systems:     All systems were reviewed and negative except as mentioned in subjective, assessment and plan.    Vital Signs:    Temp:  [97.9 °F (36.6 °C)-98.8 °F (37.1 °C)] 98.8 °F (37.1 °C)  Heart Rate:  [78-88] 78  Resp:  [16-18] 16  BP: (119-137)/(59-80) 130/59    Intake and output:    I/O last 3 completed shifts:  In: 4650 [P.O.:480; I.V.:4170]  Out:  [Urine:2000]  I/O this shift:  In: 120 [P.O.:120]  Out: -     Physical Examination:    General Appearance:  Alert and cooperative, no acute distress on exam, young female, pleasant   Head:  Atraumatic and normocephalic.   Eyes: Conjunctivae and sclerae normal, no icterus. No pallor.   Throat: No oral lesions, no thrush, oral mucosa moist.   Neck: Supple, trachea midline   Lungs:   Breath sounds heard bilaterally equally.  No wheezing or crackles. Unlabored on room air   Heart:  Normal S1 and S2, no murmur, no gallop, no rub. No JVD.   Abdomen:   Sluggish bowel sounds, no masses, no organomegaly. Soft, expected tenderness post-op, vertical wound with wound vac in place with dressing CDI, Csection wound with no evidence of infection, lap sites noted without s/s of infection    Extremities: Supple, no edema, no cyanosis, no clubbing.   Skin: No bleeding or rash.   Neurologic: Alert and oriented x 3. No facial asymmetry. Moves all four limbs. No tremors.       Laboratory results:    Results from last 7 days   Lab Units 23  0525 23  0531 23  0553 23  0308   SODIUM mmol/L 136 135* 136 141   POTASSIUM mmol/L 4.1 4.1 4.2 3.8   CHLORIDE mmol/L 102 101 103 105   CO2 mmol/L 24.9 23.0 22.7 24.8   BUN mg/dL 10 9 10 14   CREATININE  "mg/dL 0.58 0.57 0.75 0.87   CALCIUM mg/dL 8.8 9.0 8.9 9.3   BILIRUBIN mg/dL  --   --   --  0.4   ALK PHOS U/L  --   --   --  104   ALT (SGPT) U/L  --   --   --  23   AST (SGOT) U/L  --   --   --  16   GLUCOSE mg/dL 109* 117* 143* 98     Results from last 7 days   Lab Units 23  0525 23  0531 23  0553   WBC 10*3/mm3 9.78 12.21* 11.88*   HEMOGLOBIN g/dL 9.2* 10.3* 10.0*   HEMATOCRIT % 30.0* 32.8* 31.8*   PLATELETS 10*3/mm3 295 288 279         Results from last 7 days   Lab Units 23  0308   HSTROP T ng/L <6     Results from last 7 days   Lab Units 23  0418   WOUNDCX  Scant growth (1+) Normal Skin Noemí     No results for input(s): \"PHART\", \"LMO2XDJ\", \"PO2ART\", \"QCR9WXK\", \"BASEEXCESS\" in the last 8760 hours.   I have reviewed the patient's laboratory results.    Radiology results:    No radiology results from the last 24 hrs  I have reviewed the patient's radiology reports.    Medication Review:     I have reviewed the patient's active and prn medications.     Problem List:      Right lower quadrant abdominal pain    Abdominal pain, right lower quadrant      Assessment:    Possible appendicitis, POA  Status post laparotomy with ileocecectomy   Status post   Right lower quadrant abdominal pain, POA    Plan:    Possible appendicitis  Status post laparotomy with ileocecectomy  -Dr. Miranda performed a diagnostic laparoscopy converted to laparotomy with ileocecectomy on .  -Pain control PRN  -Continue IV fluids for now   -Zosyn continued  -Encouraged ambulation as patient is up ad ana  -Passing flatus at time of exam with belching, no BM  -Tolerating clears but reports continued nausea  -Encourage incentive spirometer     Status post   -Dr. Otero consulted for possible wound dehiscence  -Noted  site appeared healthy with no evidence of infection   -Recommended leave  site open to air, avoid manipulation and continue antibiotics   -Follow-up on wound " cultures from  site, which has been with no growth    DVT Prophylaxis: SCDs  Code Status: Full Code  Diet: Clears  Discharge Plan: Home in 1-2 days    ADAM Durham  23  12:12 EST    Dictated utilizing Dragon dictation.      Electronically signed by Beti Méndez APRN at 23 1224       Consult Notes (last 24 hours)  Notes from 23 1018 through 23 1018   No notes of this type exist for this encounter.       Physical Therapy Notes (last 24 hours)  Notes from 23 1018 through 23 1018   No notes exist for this encounter.       Occupational Therapy Notes (last 24 hours)  Notes from 23 1018 through 23 1018   No notes exist for this encounter.

## 2023-12-06 NOTE — PLAN OF CARE
Problem: Adult Inpatient Plan of Care  Goal: Plan of Care Review  Outcome: Ongoing, Progressing  Goal: Patient-Specific Goal (Individualized)  Outcome: Ongoing, Progressing  Goal: Absence of Hospital-Acquired Illness or Injury  Outcome: Ongoing, Progressing  Intervention: Identify and Manage Fall Risk  Recent Flowsheet Documentation  Taken 12/6/2023 0200 by Berta Beatncourt RN  Safety Promotion/Fall Prevention:   activity supervised   assistive device/personal items within reach   clutter free environment maintained   safety round/check completed  Taken 12/6/2023 0000 by Berta Betancourt RN  Safety Promotion/Fall Prevention:   activity supervised   assistive device/personal items within reach   clutter free environment maintained   safety round/check completed  Taken 12/5/2023 2200 by Berta Betancourt RN  Safety Promotion/Fall Prevention:   activity supervised   assistive device/personal items within reach   clutter free environment maintained   safety round/check completed  Taken 12/5/2023 2030 by Berta Betancourt RN  Safety Promotion/Fall Prevention:   activity supervised   assistive device/personal items within reach   clutter free environment maintained   safety round/check completed  Intervention: Prevent Skin Injury  Recent Flowsheet Documentation  Taken 12/6/2023 0200 by Berta Betancourt RN  Body Position:   position changed independently   sitting up in bed  Taken 12/6/2023 0000 by Berta Betancourt RN  Body Position:   position changed independently   supine, legs elevated  Taken 12/5/2023 2200 by Berta Betancourt RN  Body Position:   position changed independently   supine, legs elevated  Taken 12/5/2023 2030 by Berta Betancourt RN  Body Position:   position changed independently   sitting up in bed  Intervention: Prevent and Manage VTE (Venous Thromboembolism) Risk  Recent Flowsheet Documentation  Taken 12/6/2023 0200 by Berta Betancourt RN  Activity Management: activity  encouraged  Taken 12/6/2023 0000 by Berta Betancourt RN  Activity Management: activity encouraged  Taken 12/5/2023 2132 by Berta Betancourt RN  Activity Management: ambulated outside room  Taken 12/5/2023 2030 by Berta Betancourt RN  Activity Management: activity encouraged  Intervention: Prevent Infection  Recent Flowsheet Documentation  Taken 12/6/2023 0200 by Berta Betancourt RN  Infection Prevention: environmental surveillance performed  Taken 12/6/2023 0000 by Berta Betancourt RN  Infection Prevention: environmental surveillance performed  Taken 12/5/2023 2200 by Berta Betancourt RN  Infection Prevention: environmental surveillance performed  Taken 12/5/2023 2030 by Berta Betancourt RN  Infection Prevention: environmental surveillance performed  Goal: Optimal Comfort and Wellbeing  Outcome: Ongoing, Progressing  Goal: Readiness for Transition of Care  Outcome: Ongoing, Progressing     Problem: Pain Acute  Goal: Acceptable Pain Control and Functional Ability  Outcome: Ongoing, Progressing  Intervention: Prevent or Manage Pain  Recent Flowsheet Documentation  Taken 12/6/2023 0200 by Berta Betancourt RN  Medication Review/Management: medications reviewed  Taken 12/6/2023 0000 by Berta Betancourt RN  Medication Review/Management: medications reviewed  Taken 12/5/2023 2200 by Berta Betancourt RN  Medication Review/Management: medications reviewed  Taken 12/5/2023 2030 by Berta Betancourt RN  Medication Review/Management: medications reviewed     Problem: Impaired Wound Healing  Goal: Optimal Wound Healing  Outcome: Ongoing, Progressing  Intervention: Promote Wound Healing  Recent Flowsheet Documentation  Taken 12/6/2023 0200 by Berta Betancourt RN  Activity Management: activity encouraged  Taken 12/6/2023 0000 by Berta Betancourt RN  Activity Management: activity encouraged  Taken 12/5/2023 2132 by Berta Betancourt RN  Activity Management: ambulated outside room  Taken 12/5/2023 2030 by  Berta Betancourt RN  Activity Management: activity encouraged     Problem: Bowel Motility Impaired (Surgery Nonspecified)  Goal: Effective Bowel Elimination  Outcome: Ongoing, Progressing     Problem: Fluid and Electrolyte Imbalance (Surgery Nonspecified)  Goal: Fluid and Electrolyte Balance  Outcome: Ongoing, Progressing     Problem: Pain (Surgery Nonspecified)  Goal: Acceptable Pain Control  Outcome: Ongoing, Progressing     Problem: Postoperative Nausea and Vomiting (Surgery Nonspecified)  Goal: Nausea and Vomiting Relief  Outcome: Ongoing, Progressing     Problem: Respiratory Compromise (Surgery Nonspecified)  Goal: Effective Oxygenation and Ventilation  Outcome: Ongoing, Progressing  Intervention: Optimize Oxygenation and Ventilation  Recent Flowsheet Documentation  Taken 12/6/2023 0200 by Berta Betancourt RN  Head of Bed (HOB) Positioning: HOB elevated  Taken 12/6/2023 0000 by Berta Betancourt RN  Head of Bed (HOB) Positioning: HOB elevated  Taken 12/5/2023 2200 by Berta Betancourt RN  Head of Bed (HOB) Positioning: HOB elevated  Taken 12/5/2023 2030 by Berta Betancourt RN  Head of Bed (HOB) Positioning: HOB elevated   Goal Outcome Evaluation:      Plan of care reviewed with patient. Patient ambulated the raines prior to bed last night and had a bowel movement this shift. Bowel sounds are more active as well. Patient has had intermittent nausea and pain, see eMAR for treatment. No other significant events this shift.

## 2023-12-06 NOTE — PROGRESS NOTES
[facility]    Name:  Lauren Carrasco   Age:  33 y.o.  Sex:  female  :  1990  MRN:  [id number]   Visit Number:  [id number]  Admission Date:  2023  Date Of Service:  23  Primary Care Physician:  Jaqueline Hays APRN     LOS: 3 days :    Chief Complaint:      Abdominal Pain    Subjective:    Patient seen and examined at bedside this morning.  She has been up in the room and reports a couple bowel movements overnight.  She denies any nausea.  Tolerating clears.  Eager for discharge home.   Discussed diet advancement, [place] d/c, and possible d/c home tomorrow to see how she tolerates advancing diet.  She is agreeable.    Hospital Course:    Patient is a 33 years old female with a past medical history of  3.5 weeks who presented to the ER with a chief complaint of abdominal pain.  Patient reports that she was doing well after her  until 3 days ago when she started having abdominal discomfort in her right lower quadrant that radiated to her back, patient then started having fever yesterday of 100.5 F and her pain became worse so she decided to come to the ER for evaluation.  Patient denies any vomiting or urinary symptoms.  Patient denies changes in her bowel habits.  Patient has some remaining mild incisional drainage at the site of her . Patient confirmed that her appendix ruptured when she was 11 years old and had appendectomy.  No other abdominal surgeries.     On ER evaluation, her vitals were stable and was afebrile.  Her workup was pretty much within acceptable range except for hemoglobin of 10.0.  WBC WNL.  Urinalysis negative for infection.  CT abdomen pelvis showed findings consistent with acute appendicitis, Dilated appendix measuring up to 2.2 cm in diameter, with adjacent fat stranding and small fluid. Appendicolith at the appendix base. Heterogeneous fluid collection anterior to the uterus, which  could be due to infected fluid or blood products. No evidence of active bleeding. Anterior abdominal wall  scar with no visible associated abscess.  Patient received Toradol.  Case discussed with Dr. Miranda with general surgery who recommended admission for observation, no indication for antibiotics at this time with plan on repeating CT scan for evaluation. Hospitalist consulted for admission.     Dr. Miranda performed a diagnostic laparoscopy converted to laparotomy with ileocecectomy on . Dr. Otero with GYN consulted for possible wound dehiscence, He thought that her  site appeared healthy with no evidence of infection. Recommended leave  site open to air, avoid manipulation and continue antibiotics. Antibiotics switched from cefazolin/metronidazole to Zosyn after discussing with general surgery and OB/GYN.    Review of Systems:     All systems were reviewed and negative except as mentioned in subjective, assessment and plan.    Vital Signs:    Temp:  [98 °F (36.7 °C)-98.8 °F (37.1 °C)] 98.4 °F (36.9 °C)  Heart Rate:  [61-74] 72  Resp:  [16] 16  BP: (120-145)/(62-79) 120/62    Intake and output:    I/O last 3 completed shifts:  In: 5430 [P.O.:480; I.V.:4950]  Out: 1200 [Urine:1200]  I/O this shift:  In: 240 [P.O.:240]  Out: 1450 [Urine:1450]    Physical Examination:  Examined again 2023    General Appearance:  Alert and cooperative, no acute distress on exam, young female, pleasant   Head:  Atraumatic and normocephalic.   Eyes: Conjunctivae and sclerae normal, no icterus. No pallor.   Throat: No oral lesions, no thrush, oral mucosa moist.   Neck: Supple, trachea midline   Lungs:   Breath sounds heard bilaterally equally.  No wheezing or crackles. Unlabored on room air   Heart:  Normal S1 and S2, no murmur, no gallop, no rub. No JVD.   Abdomen:   Sluggish bowel sounds, no masses, no organomegaly. Soft, expected tenderness post-op, vertical wound with wound vac in place with  "dressing CDI, Csection wound with no evidence of infection, lap sites noted without s/s of infection    Extremities: Supple, no edema, no cyanosis, no clubbing.   Skin: No bleeding or rash.   Neurologic: Alert and oriented x 3. No facial asymmetry. Moves all four limbs. No tremors.       Laboratory results:    Results from last 7 days   Lab Units 12/06/23  0526 12/05/23  0525 12/04/23  0531 12/03/23  0553 12/02/23  0308   SODIUM mmol/L 137 136 135*   < > 141   POTASSIUM mmol/L 3.7 4.1 4.1   < > 3.8   CHLORIDE mmol/L 102 102 101   < > 105   CO2 mmol/L 27.0 24.9 23.0   < > 24.8   BUN mg/dL 8 10 9   < > 14   CREATININE mg/dL 0.62 0.58 0.57   < > 0.87   CALCIUM mg/dL 8.9 8.8 9.0   < > 9.3   BILIRUBIN mg/dL  --   --   --   --  0.4   ALK PHOS U/L  --   --   --   --  104   ALT (SGPT) U/L  --   --   --   --  23   AST (SGOT) U/L  --   --   --   --  16   GLUCOSE mg/dL 93 109* 117*   < > 98    < > = values in this interval not displayed.     Results from last 7 days   Lab Units 12/06/23  0526 12/05/23  0525 12/04/23  0531   WBC 10*3/mm3 7.36 9.78 12.21*   HEMOGLOBIN g/dL 8.8* 9.2* 10.3*   HEMATOCRIT % 28.4* 30.0* 32.8*   PLATELETS 10*3/mm3 311 295 288         Results from last 7 days   Lab Units 12/02/23  0308   HSTROP T ng/L <6     Results from last 7 days   Lab Units 12/02/23  0418   WOUNDCX  Light growth (2+) Actinomyces turicensis*  Scant growth (1+) Normal Skin Noemí     No results for input(s): \"PHART\", \"DIJ6DDI\", \"PO2ART\", \"CLP8DKF\", \"BASEEXCESS\" in the last 8760 hours.   I have reviewed the patient's laboratory results.    Radiology results:    No radiology results from the last 24 hrs  I have reviewed the patient's radiology reports.    Medication Review:     I have reviewed the patient's active and prn medications.     Problem List:      Right lower quadrant abdominal pain    Abdominal pain, right lower quadrant      Assessment:    Possible appendicitis, POA  Status post laparotomy with ileocecectomy 12/2  Status " post   Right lower quadrant abdominal pain, POA    Plan:    Possible appendicitis  Status post laparotomy with ileocecectomy  -Dr. Miranda performed a diagnostic laparoscopy converted to laparotomy with ileocecectomy on .  -Pain control PRN  -Continue IV fluids for now   -Zosyn continued  -Encouraged ambulation as patient is up ad ana  -Reports several bowel movements overnight  -Tolerating clears  -Encourage incentive spirometer  -Discussed with Dr. Miranda to advance diet to GI soft, bland and see how she does     Status post   -Dr. Otero consulted for possible wound dehiscence  -Noted  site appeared healthy with no evidence of infection   -Recommended leave  site open to air, avoid manipulation and continue antibiotics   -Follow-up on wound cultures from  site growing light Actinomyces turicensis--sensitivities pending    DVT Prophylaxis: SCDs  Code Status: Full Code  Diet: Clears  Discharge Plan: Likely home tomorrow    Beti Méndez, APRN  23  11:39 EST    Dictated utilizing Dragon dictation.

## 2023-12-07 NOTE — PAYOR COMM NOTE
"Lauren Carrasco (33 y.o. Female)       Date of Birth   1990    Social Security Number       Address   [address] [address]    Home Phone   [phone]    MRN   [id number]       Adventism   None    Marital Status                               Admission Date   23    Admission Type   Emergency    Admitting Provider   Alonso Younger DO    Attending Provider       Department, Room/Bed   [facility] TELEMETRY 4, 426/1       Discharge Date   2023    Discharge Disposition   Home or Self Care    Discharge Destination   Home                              Attending Provider: (none)   Allergies: No Known Allergies    Isolation: None   Infection: None   Code Status: Prior    Ht: 167.6 cm (66\")   Wt: 121 kg (266 lb 8.6 oz)    Admission Cmt: None   Principal Problem: Right lower quadrant abdominal pain [R10.31]                   Active Insurance as of 2023       Primary Coverage       Payor Plan Insurance Group Employer/Plan Group    [place] [id number]       Payor Plan Address Payor Plan Phone Number Payor Plan Fax Number Effective Dates    [employer] [address] [address] [phone]  3/1/2023 - None Entered    [facility] [id number]         Subscriber Name Subscriber Birth Date Member ID       LAUREN CARRASCO 1990 [id number]                     Emergency Contacts        (Rel.) Home Phone Work Phone Mobile Phone    Cal Carrasco (Spouse) -- -- [phone]    murtaza watson -- -- [phone]                 Discharge Summary        Beti Méndez APRN at 23 1445       Attestation signed by Harpal Granado MD at 23 1544    I have reviewed this documentation and agree.                      [facility] HOSPITALIST   DISCHARGE SUMMARY      Name:  Lauren Carrasco   Age:  33 y.o.  Sex:  female  :  1990  MRN:  [id number]   Visit Number:  [id number]    Admission " Date:  2023  Date of Discharge:  2023  Primary Care Physician:  Jaqueline Hays APRN    Important issues to note:    Admitted to the hospital with right lower quadrant pain 3.5 weeks post partum with concern for appendicitis.  Dr. Miranda was consulted on admission.  Taken to OR for diagnostic laparotomy with ileocecectomy on  with Dr. Miranda.  Dr. Otero (OB/GYN) was consulted for possible  wound dehiscence.  Recommended leave  site open to air, avoid manipulation and continue antibiotics. Antibiotics switched from cefazolin/metronidazole to Zosyn after discussing with general surgery and OB/GYN.   Patient encouraged to ambulate post op and bowel function slowly returned with bowel movements last night.  Diet was advanced from clears to GI soft without difficulty.  On day of discharge denies any nausea, vomiting, or pain.    Discussed with Dr. Miranda who is agreeable to discharge home today.  Patient given discharge instructions per Dr. Miranda.  Patient will go home with the Prevena and has been instructed to remove it when it loses suction or starts beeping and then leave her incision open to air. Weight lifting precautions were discussed. No swimming, soaking, or tub bathing for 2-3 weeks. Patient will see Dr. Miranda in one week for follow up.  No antibiotics needed at discharge per surgery.  Strict return to hospital instructions with any fevers, increased pain, nausea/vomiting, or cessation of bowel movements.    Discharge Diagnoses:     Abdominal Wall Abscess, POA  Status post laparotomy with ileocecectomy   Status post   Right lower quadrant abdominal pain, POA    Problem List:     Active Hospital Problems    Diagnosis  POA    **Right lower quadrant abdominal pain [R10.31]  Yes    Abdominal pain, right lower quadrant [R10.31]  Yes    Hx of  section [Z98.891]  Not Applicable      Resolved Hospital Problems   No resolved problems to display.      Presenting Problem:    Chief Complaint   Patient presents with    Abdominal Pain      Consults:     Consulting Physician(s)         Provider   Role Specialty     Amrit Otero MD  Consulting Physician Obstetrics and Gynecology     Kelly Miranda DO  Consulting Physician General Surgery          Procedures Performed:    Procedure(s):  DIAGNOSTIC LAPAROSCOPY with lysis of adhesions, exploratory laparatomy with ileocecectomy    History of presenting illness/Hospital Course:    Patient is a 33 years old female with a past medical history of  3.5 weeks who presented to the ER with a chief complaint of abdominal pain.  Patient reports that she was doing well after her  until 3 days ago when she started having abdominal discomfort in her right lower quadrant that radiated to her back, patient then started having fever yesterday of 100.5 F and her pain became worse so she decided to come to the ER for evaluation.  Patient denies any vomiting or urinary symptoms.  Patient denies changes in her bowel habits.  Patient has some remaining mild incisional drainage at the site of her . Patient confirmed that her appendix ruptured when she was 11 years old and had appendectomy.  No other abdominal surgeries.     On ER evaluation, her vitals were stable and was afebrile.  Her workup was pretty much within acceptable range except for hemoglobin of 10.0.  WBC WNL.  Urinalysis negative for infection.  CT abdomen pelvis showed findings consistent with acute appendicitis, Dilated appendix measuring up to 2.2 cm in diameter, with adjacent fat stranding and small fluid. Appendicolith at the appendix base. Heterogeneous fluid collection anterior to the uterus, which could be due to infected fluid or blood products. No evidence of active bleeding. Anterior abdominal wall  scar with no visible associated abscess.  Patient received Toradol.  Case discussed with Dr. Miranda with general  surgery who recommended admission for observation, no indication for antibiotics at this time with plan on repeating CT scan for evaluation. Hospitalist consulted for admission.     Dr. Miranda performed a diagnostic laparoscopy converted to laparotomy with ileocecectomy on . Dr. Otero with GYN consulted for possible wound dehiscence, He thought that her  site appeared healthy with no evidence of infection. Recommended leave  site open to air, avoid manipulation and continue antibiotics. Antibiotics switched from Cefazolin/Metronidazole to Zosyn after discussing with general surgery and OB/GYN.    Patient continued on Zosyn post-operatively.  Diet was advanced to clears which patient tolerated and bowel function noted to return last night with bowel movements.  Diet advanced to GI soft today and patient tolerated well.  Patient denies any nausea or pain after eating.  Discussed with Dr. Miranda who is agreeable to discharge home today.  Patient given discharge instructions per Dr. Miranda.  Patient will go home with the Prevena and has been instructed to remove it when it loses suction or starts beeping and then leave her incision open to air. Weight lifting precautions were discussed. No swimming, soaking, or tub bathing for 2-3 weeks. Patient will see Dr. Miranda in one week for follow up.  No antibiotics needed at discharge per surgery.  Strict return to hospital instructions with any fevers, increased pain, nausea/vomiting, or cessation of bowel movements.       Vital Signs:    Temp:  [98 °F (36.7 °C)-98.6 °F (37 °C)] 98.4 °F (36.9 °C)  Heart Rate:  [61-72] 72  Resp:  [16] 16  BP: (120-145)/(62-79) 120/62    Physical Exam:    General Appearance:  Alert and cooperative, pleasant young female, no acute distress on exam   Head:  Atraumatic and normocephalic.   Eyes: Conjunctivae and sclerae normal, no icterus. No pallor.   Ears:  Ears with no abnormalities noted.   Throat: No oral lesions, no  thrush, oral mucosa moist.   Neck: Supple, trachea midline   Back:   No kyphoscoliosis present. No tenderness to palpation.   Lungs:   Breath sounds heard bilaterally equally.  No crackles or wheezing. Unlabored on room air   Heart:  Normal S1 and S2, no murmur, no gallop, no rub. No JVD.   Abdomen:   Normal bowel sounds, no masses, no organomegaly. Soft, expected tenderness post-op, vertical wound with wound vac in place with dressing CDI, Csection wound with no evidence of infection, lap sites noted without s/s of infection    Extremities: Supple, no edema, no cyanosis, no clubbing.   Pulses: Pulses palpable bilaterally.   Skin: No bleeding or rash.   Neurologic: Alert and oriented x 3. No facial asymmetry. Moves all four limbs. No tremors.     Pertinent Lab Results:     Results from last 7 days   Lab Units 12/06/23  0526 12/05/23  0525 12/04/23  0531 12/03/23  0553 12/02/23  0308   SODIUM mmol/L 137 136 135*   < > 141   POTASSIUM mmol/L 3.7 4.1 4.1   < > 3.8   CHLORIDE mmol/L 102 102 101   < > 105   CO2 mmol/L 27.0 24.9 23.0   < > 24.8   BUN mg/dL 8 10 9   < > 14   CREATININE mg/dL 0.62 0.58 0.57   < > 0.87   CALCIUM mg/dL 8.9 8.8 9.0   < > 9.3   BILIRUBIN mg/dL  --   --   --   --  0.4   ALK PHOS U/L  --   --   --   --  104   ALT (SGPT) U/L  --   --   --   --  23   AST (SGOT) U/L  --   --   --   --  16   GLUCOSE mg/dL 93 109* 117*   < > 98    < > = values in this interval not displayed.     Results from last 7 days   Lab Units 12/06/23  0526 12/05/23  0525 12/04/23  0531   WBC 10*3/mm3 7.36 9.78 12.21*   HEMOGLOBIN g/dL 8.8* 9.2* 10.3*   HEMATOCRIT % 28.4* 30.0* 32.8*   PLATELETS 10*3/mm3 311 295 288         Results from last 7 days   Lab Units 12/02/23  0308   HSTROP T ng/L <6             Results from last 7 days   Lab Units 12/02/23  0308   LIPASE U/L 13         Results from last 7 days   Lab Units 12/02/23  0418   WOUNDCX  Light growth (2+) Actinomyces turicensis*  Scant growth (1+) Normal Skin Noemí        Pertinent Radiology Results:    Imaging Results (All)       Procedure Component Value Units Date/Time    CT Abdomen Pelvis With Contrast [[id number]] Collected: 23     Updated: 23    Addenda:        ADDENDUM REPORT    ADDENDUM:  This report was discussed with DR. ZIYAD MONTGOMERY on Dec 02, 2023 04:52:00   EST.    Authenticated and Electronically Signed by Lazaro Rich MD on  2023 04:53:13 AM  Signed: 23 by Lazaro Rich MD    Narrative:      FINAL REPORT    TECHNIQUE:  null    CLINICAL HISTORY:  3 weeks post c section, fever, incision drainage    COMPARISON:  null    FINDINGS:  CT Abdomen and Pelvis W Contrast    COMPARISON: CT - ABDOMEN/PELVIS W/O CONTRAST - 2015 04:36 PM EDT    FINDINGS:    Normal liver.    Splenomegaly.    Left renal cyst. Unremarkable right kidney. No hydronephrosis.    Normal adrenal glands.    Normal pancreas.    No visible gallstones. No biliary dilatation.    No evidence of bowel obstruction or colitis.    Dilated appendix measuring up to 2.2 cm in diameter, with adjacent fat stranding and small fluid. Appendicolith at the appendix base.    Unremarkable bladder.     scar in the anterior uterus. There is a small heterogeneous fluid collection anterior to the uterus (for example series 4, image 82). No contrast blush.    No pneumoperitoneum.    No lymphadenopathy.    No acute fracture.    No abdominal aortic aneurysm.    Small fat containing umbilical hernia. Anterior abdominal wall  scar with no visible associated abscess.      Impression:      IMPRESSION:    Findings consistent with acute appendicitis.    Heterogeneous fluid collection anterior to the uterus, which could be due to infected fluid or blood products. No evidence of active bleeding.    Nonemergent/incidental findings in the report.    Authenticated and Electronically Signed by Lazaro Rich MD on  2023 04:48:29 AM              Condition on Discharge:       Stable.    Code status during the hospital stay:    Code Status and Medical Interventions:   Ordered at: 12/02/23 0932     Code Status (Patient has no pulse and is not breathing):    CPR (Attempt to Resuscitate)     Medical Interventions (Patient has pulse or is breathing):    Full Support     Discharge Disposition:    Home or Self Care    Discharge Medications:       Discharge Medications        New Medications        Instructions Start Date   HYDROcodone-acetaminophen 5-325 MG per tablet  Commonly known as: NORCO   1 tablet, Oral, Every 6 Hours PRN             Changes to Medications        Instructions Start Date   Prenatal Vitamin 27-0.8 MG tablet  What changed: Another medication with the same name was removed. Continue taking this medication, and follow the directions you see here.   1 tablet, Oral, Daily             Continue These Medications        Instructions Start Date   Acetaminophen Extra Strength 500 MG tablet   1,000 mg, Oral, Every 8 Hours PRN      ferrous sulfate 325 (65 FE) MG tablet   325 mg, Oral, 2 Times Daily Before Meals      ibuprofen 800 MG tablet  Commonly known as: ADVIL,MOTRIN   800 mg, Oral, Every 8 Hours PRN             Discharge Diet:     Diet Instructions       Advance Diet As Tolerated -Target Diet: Advance very slowly as tolerated back to regular diet      Target Diet: Advance very slowly as tolerated back to regular diet          Activity at Discharge:     Activity Instructions       Activity as Tolerated            Follow-up Appointments:     Follow-up Information       Kelly Miranda, DO Follow up in 1 week(s).    Specialty: General Surgery  Contact information:  [address]  [place] [id number] [phone]               Jaqueline Hays, APRN .    Specialty: Family Medicine  Contact information:  [facility] [place] [id number] [phone]                           Future Appointments   Date Time Provider Department Center   12/18/2023 10:00 AM  Magaly Brown, [facility]          Beti RAMIREZ Dev, APRN  12/06/23  15:02 EST    Time: I spent 35 minutes on this discharge activity which included: face-to-face encounter with the patient, reviewing the data in the system, coordination of the care with the nursing staff as well as consultants, documentation, and entering orders.     Dictated utilizing Dragon dictation.      Electronically signed by Harpal Granado MD at 12/06/23 7540

## 2023-12-07 NOTE — OUTREACH NOTE
Prep Survey      Flowsheet Row Responses   [facility] facility patient discharged from? [facility]   Is LACE score < 7 ? No   Eligibility Readm Mgmt   Discharge diagnosis Abdominal Wall Abscess, POA  Status post laparotomy with ileocecectomy 12/2   Does the patient have one of the following disease processes/diagnoses(primary or secondary)? General Surgery   Does the patient have Home health ordered? No   Is there a DME ordered? No   Prep survey completed? Yes            VARGAS WILLOUGHBY - Registered Nurse

## 2023-12-11 ENCOUNTER — APPOINTMENT (OUTPATIENT)
Dept: CT IMAGING | Facility: HOSPITAL | Age: 33
End: 2023-12-11
Payer: MEDICAID

## 2023-12-11 ENCOUNTER — READMISSION MANAGEMENT (OUTPATIENT)
Dept: CALL CENTER | Facility: HOSPITAL | Age: 33
End: 2023-12-11
Payer: MEDICAID

## 2023-12-11 ENCOUNTER — HOSPITAL ENCOUNTER (EMERGENCY)
Facility: HOSPITAL | Age: 33
Discharge: HOME | End: 2023-12-11
Attending: EMERGENCY MEDICINE | Admitting: EMERGENCY MEDICINE
Payer: MEDICAID

## 2023-12-11 VITALS
SYSTOLIC BLOOD PRESSURE: 116 MMHG | DIASTOLIC BLOOD PRESSURE: 72 MMHG | HEART RATE: 100 BPM | BODY MASS INDEX: 39.79 KG/M2 | OXYGEN SATURATION: 98 % | TEMPERATURE: 98.1 F | HEIGHT: 66 IN | RESPIRATION RATE: 16 BRPM | WEIGHT: 247.6 LBS

## 2023-12-11 DIAGNOSIS — S30.1XXA ABDOMINAL WALL SEROMA, INITIAL ENCOUNTER: Primary | ICD-10-CM

## 2023-12-11 LAB
ALBUMIN SERPL-MCNC: 4 G/DL (ref 3.5–5.2)
ALBUMIN/GLOB SERPL: 1.1 G/DL
ALP SERPL-CCNC: 132 U/L (ref 39–117)
ALT SERPL W P-5'-P-CCNC: 29 U/L (ref 1–33)
ANION GAP SERPL CALCULATED.3IONS-SCNC: 12.1 MMOL/L (ref 5–15)
AST SERPL-CCNC: 15 U/L (ref 1–32)
B-HCG UR QL: NEGATIVE
BACTERIA UR QL AUTO: ABNORMAL /HPF
BASOPHILS # BLD AUTO: 0.05 10*3/MM3 (ref 0–0.2)
BASOPHILS NFR BLD AUTO: 0.4 % (ref 0–1.5)
BILIRUB SERPL-MCNC: 0.3 MG/DL (ref 0–1.2)
BILIRUB UR QL STRIP: NEGATIVE
BUN SERPL-MCNC: 18 MG/DL (ref 6–20)
BUN/CREAT SERPL: 25 (ref 7–25)
CALCIUM SPEC-SCNC: 9.6 MG/DL (ref 8.6–10.5)
CHLORIDE SERPL-SCNC: 100 MMOL/L (ref 98–107)
CLARITY UR: CLEAR
CO2 SERPL-SCNC: 23.9 MMOL/L (ref 22–29)
COLOR UR: YELLOW
CREAT SERPL-MCNC: 0.72 MG/DL (ref 0.57–1)
D-LACTATE SERPL-SCNC: 0.9 MMOL/L (ref 0.5–2)
DEPRECATED RDW RBC AUTO: 42.5 FL (ref 37–54)
EGFRCR SERPLBLD CKD-EPI 2021: 113.4 ML/MIN/1.73
EOSINOPHIL # BLD AUTO: 0.13 10*3/MM3 (ref 0–0.4)
EOSINOPHIL NFR BLD AUTO: 1 % (ref 0.3–6.2)
ERYTHROCYTE [DISTWIDTH] IN BLOOD BY AUTOMATED COUNT: 14.1 % (ref 12.3–15.4)
FLUAV SUBTYP SPEC NAA+PROBE: NOT DETECTED
FLUBV RNA ISLT QL NAA+PROBE: NOT DETECTED
GLOBULIN UR ELPH-MCNC: 3.7 GM/DL
GLUCOSE SERPL-MCNC: 104 MG/DL (ref 65–99)
GLUCOSE UR STRIP-MCNC: NEGATIVE MG/DL
HCT VFR BLD AUTO: 32.5 % (ref 34–46.6)
HGB BLD-MCNC: 10.3 G/DL (ref 12–15.9)
HGB UR QL STRIP.AUTO: ABNORMAL
HOLD SPECIMEN: NORMAL
HOLD SPECIMEN: NORMAL
HYALINE CASTS UR QL AUTO: ABNORMAL /LPF
IMM GRANULOCYTES # BLD AUTO: 0.35 10*3/MM3 (ref 0–0.05)
IMM GRANULOCYTES NFR BLD AUTO: 2.6 % (ref 0–0.5)
KETONES UR QL STRIP: NEGATIVE
LEUKOCYTE ESTERASE UR QL STRIP.AUTO: NEGATIVE
LYMPHOCYTES # BLD AUTO: 1.58 10*3/MM3 (ref 0.7–3.1)
LYMPHOCYTES NFR BLD AUTO: 11.9 % (ref 19.6–45.3)
MCH RBC QN AUTO: 26.5 PG (ref 26.6–33)
MCHC RBC AUTO-ENTMCNC: 31.7 G/DL (ref 31.5–35.7)
MCV RBC AUTO: 83.5 FL (ref 79–97)
MONOCYTES # BLD AUTO: 0.9 10*3/MM3 (ref 0.1–0.9)
MONOCYTES NFR BLD AUTO: 6.8 % (ref 5–12)
MUCOUS THREADS URNS QL MICRO: ABNORMAL /HPF
NEUTROPHILS NFR BLD AUTO: 10.3 10*3/MM3 (ref 1.7–7)
NEUTROPHILS NFR BLD AUTO: 77.3 % (ref 42.7–76)
NITRITE UR QL STRIP: NEGATIVE
NRBC BLD AUTO-RTO: 0 /100 WBC (ref 0–0.2)
PH UR STRIP.AUTO: 5.5 [PH] (ref 5–8)
PLATELET # BLD AUTO: 423 10*3/MM3 (ref 140–450)
PMV BLD AUTO: 9.3 FL (ref 6–12)
POTASSIUM SERPL-SCNC: 3.9 MMOL/L (ref 3.5–5.2)
PROCALCITONIN SERPL-MCNC: 0.05 NG/ML (ref 0–0.25)
PROT SERPL-MCNC: 7.7 G/DL (ref 6–8.5)
PROT UR QL STRIP: NEGATIVE
RBC # BLD AUTO: 3.89 10*6/MM3 (ref 3.77–5.28)
RBC # UR STRIP: ABNORMAL /HPF
REF LAB TEST METHOD: ABNORMAL
SARS-COV-2 RNA RESP QL NAA+PROBE: NOT DETECTED
SODIUM SERPL-SCNC: 136 MMOL/L (ref 136–145)
SP GR UR STRIP: 1.02 (ref 1–1.03)
SQUAMOUS #/AREA URNS HPF: ABNORMAL /HPF
UROBILINOGEN UR QL STRIP: ABNORMAL
WBC # UR STRIP: ABNORMAL /HPF
WBC NRBC COR # BLD AUTO: 13.31 10*3/MM3 (ref 3.4–10.8)
WHOLE BLOOD HOLD COAG: NORMAL
WHOLE BLOOD HOLD SPECIMEN: NORMAL

## 2023-12-11 PROCEDURE — 85025 COMPLETE CBC W/AUTO DIFF WBC: CPT | Performed by: NURSE PRACTITIONER

## 2023-12-11 PROCEDURE — 80053 COMPREHEN METABOLIC PANEL: CPT | Performed by: NURSE PRACTITIONER

## 2023-12-11 PROCEDURE — 83605 ASSAY OF LACTIC ACID: CPT | Performed by: NURSE PRACTITIONER

## 2023-12-11 PROCEDURE — 87636 SARSCOV2 & INF A&B AMP PRB: CPT | Performed by: EMERGENCY MEDICINE

## 2023-12-11 PROCEDURE — 81001 URINALYSIS AUTO W/SCOPE: CPT | Performed by: EMERGENCY MEDICINE

## 2023-12-11 PROCEDURE — 84145 PROCALCITONIN (PCT): CPT | Performed by: NURSE PRACTITIONER

## 2023-12-11 PROCEDURE — 74177 CT ABD & PELVIS W/CONTRAST: CPT

## 2023-12-11 PROCEDURE — 25510000001 IOPAMIDOL 61 % SOLUTION: Performed by: EMERGENCY MEDICINE

## 2023-12-11 PROCEDURE — 99285 EMERGENCY DEPT VISIT HI MDM: CPT

## 2023-12-11 PROCEDURE — 81025 URINE PREGNANCY TEST: CPT | Performed by: NURSE PRACTITIONER

## 2023-12-11 RX ORDER — SODIUM CHLORIDE 0.9 % (FLUSH) 0.9 %
10 SYRINGE (ML) INJECTION AS NEEDED
Status: DISCONTINUED | OUTPATIENT
Start: 2023-12-11 | End: 2023-12-12 | Stop reason: HOSPADM

## 2023-12-11 RX ORDER — AMOXICILLIN AND CLAVULANATE POTASSIUM 875; 125 MG/1; MG/1
1 TABLET, FILM COATED ORAL 2 TIMES DAILY
Qty: 14 TABLET | Refills: 0 | Status: SHIPPED | OUTPATIENT
Start: 2023-12-11 | End: 2023-12-18

## 2023-12-11 RX ADMIN — IOPAMIDOL 100 ML: 612 INJECTION, SOLUTION INTRAVENOUS at 21:47

## 2023-12-11 NOTE — OUTREACH NOTE
General Surgery Week 1 Survey      Flowsheet Row Responses   [facility] patient discharged from? [facility]   Does the patient have one of the following disease processes/diagnoses(primary or secondary)? General Surgery   Week 1 attempt successful? Yes   Call start time 1735   Call end time 1742   Discharge diagnosis Abdominal Wall Abscess, POA  Status post laparotomy with ileocecectomy 12/2   Person spoke with today (if not patient) and relationship Patient   Meds reviewed with patient/caregiver? Yes   Does the patient have all medications related to this admission filled (includes all antibiotics, pain medications, etc.) Yes   Is the patient taking all medications as directed (includes completed medication regime)? Yes   Does the patient have a follow up appointment scheduled with their surgeon? Yes  [12/15]   Has the patient kept scheduled appointments due by today? N/A   Has home health visited the patient within 72 hours of discharge? N/A   Psychosocial issues? No   Did the patient receive a copy of their discharge instructions? Yes   Nursing interventions Reviewed instructions with patient   What is the patient's perception of their health status since discharge? Improving   Nursing interventions Nurse provided patient education   Is the patient /caregiver able to teach back basic post-op care? Practice 'cough and deep breath'   Is the patient/caregiver able to teach back signs and symptoms of incisional infection? Increased redness, swelling or pain at the incisonal site, Increased drainage or bleeding, Incisional warmth, Pus or odor from incision, Fever   Is the patient/caregiver able to teach back steps to recovery at home? --  [Patient reports that she called DR office this morning and reported temp high of 99.8.]   If the patient is a current smoker, are they able to teach back resources for cessation? Not a smoker   Is the patient/caregiver able to teach back the hierarchy of who to call/visit for  symptoms/problems? PCP, Specialist, Home health nurse, Urgent Care, ED, 911 Yes   Week 1 call completed? Yes   Is the patient interested in additional calls from an ambulatory ? No   Would this patient benefit from a Referral to [facility] Social Work? No   Call end time [phone]            BELA GIBBONS - Registered Nurse

## 2023-12-12 ENCOUNTER — PATIENT OUTREACH (OUTPATIENT)
Dept: CALL CENTER | Facility: HOSPITAL | Age: 33
End: 2023-12-12
Payer: MEDICAID

## 2023-12-12 NOTE — OUTREACH NOTE
Motherhood Connection Survey      Flowsheet Row Responses   [facility] facility patient discharged from? [place]   Week 1 attempt successful? Yes   Call start time 1029   Call end time 1033   Baby sex Boy   Baby sex Boy   Delivery type    Emotional state Acceptance   Family support Yes   Do you have all necessary resources to care for you and your baby?  Yes   Have members of your household adjusted to your baby? Yes   Did you have any problems with pre-eclampsia during this pregnancy? No   Did you have blood glucose issues during this pregnancy No   Lochia amount Moderate   Did you have an episiotomy/tear/abdominal incision? Yes   Additional comments Fluid on incision ER  put on Antibiotics   Feeding Method Bottle   Frequency 3-3.5 hours   Amount 4 ounces   Signs baby is ready to eat Rooting, Crying, Finger sucking   Number of wet diapers x 24 hours 8-10   Last BM x 24 hours 1-2   Umbilical Cord No reported signs or symptoms   Was the baby circumcised? Yes   Circumcision care and signs/symptoms to report Reviewed   Where does the baby usually sleep? Bassinet   Are there stuffed animals, toys, pillows, quilts, blankets, wedges, positioners, bumpers or other loose bedding in the infant's sleeping environment? No   Does the baby ever share a sleep surface in a bed, couch, recliner or other? No   What position do you lay your baby down to sleep? Back   Mom appointment comments: 23   Baby appointment comments: 23   Call completed? Yes              Kate DAMIAN - Registered Nurse

## 2023-12-12 NOTE — ED PROVIDER NOTES
Subjective  History of Present Illness:    Chief Complaint: Postop fever  History of Present Illness: This is a 33-year-old female patient comes into the ED today complaining of having low-grade fever of 100.6.  Patient called her surgeon and was told to come to the emergency room for evaluation.  Patient has had a bowel resection approximately 9 days ago laparoscopically.  Patient has taken Tylenol prior to arrival.      Nurses Notes reviewed and agree, including vitals, allergies, social history and prior medical history.       Allergies:    Patient has no known allergies.      Past Surgical History:   Procedure Laterality Date    ADENOIDECTOMY       SECTION       SECTION N/A 2023    Procedure:  SECTION REPEAT;  Surgeon: Bri Jimenez MD;  Location: [facility];  Service: Obstetrics/Gynecology;  Laterality: N/A;    DIAGNOSTIC LAPAROSCOPY N/A 2023    Procedure: DIAGNOSTIC LAPAROSCOPY with lysis of adhesions, exploratory laparatomy with ileocecectomy;  Surgeon: Kelly Miranda DO;  Location: [facility];  Service: General;  Laterality: N/A;         Social History     Socioeconomic History    Marital status:     Number of children: 1    Highest education level: High school graduate   Tobacco Use    Smoking status: Never    Smokeless tobacco: Never   Vaping Use    Vaping Use: Never used   Substance and Sexual Activity    Alcohol use: No    Drug use: No    Sexual activity: Yes     Partners: Male     Birth control/protection: None         Family History   Problem Relation Age of Onset    Hypertension Father     Stroke Father     Hyperlipidemia Mother     Stroke Mother         Blood clots       REVIEW OF SYSTEMS: All systems reviewed and not pertinent unless noted.    Review of Systems   Constitutional:  Positive for fatigue and fever.   Gastrointestinal:  Positive for abdominal pain.   All other systems reviewed and are negative.      Objective    Physical Exam  Vitals and nursing  note reviewed.   Constitutional:       Appearance: Normal appearance.   HENT:      Head: Normocephalic and atraumatic.   Eyes:      Extraocular Movements: Extraocular movements intact.      Pupils: Pupils are equal, round, and reactive to light.   Cardiovascular:      Rate and Rhythm: Normal rate and regular rhythm.      Pulses: Normal pulses.      Heart sounds: Normal heart sounds.   Pulmonary:      Effort: Pulmonary effort is normal.      Breath sounds: Normal breath sounds.   Abdominal:      General: Abdomen is flat. Bowel sounds are normal.      Palpations: Abdomen is soft.      Tenderness: There is abdominal tenderness.   Musculoskeletal:      Cervical back: Normal range of motion and neck supple.   Skin:     Capillary Refill: Capillary refill takes less than 2 seconds.   Neurological:      General: No focal deficit present.      Mental Status: She is alert and oriented to person, place, and time. Mental status is at baseline.      GCS: GCS eye subscore is 4. GCS verbal subscore is 5. GCS motor subscore is 6.      Sensory: Sensation is intact.      Motor: Motor function is intact.      Gait: Gait is intact.   Psychiatric:         Attention and Perception: Attention and perception normal.         Mood and Affect: Mood and affect normal.         Speech: Speech normal.         Behavior: Behavior normal. Behavior is cooperative.           Procedures    ED Course:    ED Course as of 12/11/23 2314   Mon Dec 11, 2023   2311 Spoke with Dr. De La Cruz about CT results.  Dr. Monique at the request patient be placed on oral outpatient antibiotics and followed up in outpatient clinic this week [KH]      ED Course User Index  [KH] Gordon Todd APRN       Lab Results (last 24 hours)       Procedure Component Value Units Date/Time    COVID-19 and FLU A/B PCR, 1 HR TAT - Swab, Nasopharynx [[id number]]  (Normal) Collected: 12/11/23 2105    Specimen: Swab from Nasopharynx Updated: 12/11/23 2135     COVID19 Not Detected      Influenza A PCR Not Detected     Influenza B PCR Not Detected    Narrative:      Fact sheet for providers: https://www.fda.gov/media/841786/download    Fact sheet for patients: https://www.fda.gov/media/874060/download    Test performed by PCR.    Urinalysis With Microscopic If Indicated (No Culture) - Urine, Clean Catch [[id number]]  (Abnormal) Collected: 12/11/23 2107    Specimen: Urine, Clean Catch Updated: 12/11/23 2117     Color, UA Yellow     Appearance, UA Clear     pH, UA 5.5     Specific Gravity, UA 1.023     Glucose, UA Negative     Ketones, UA Negative     Bilirubin, UA Negative     Blood, UA Moderate (2+)     Protein, UA Negative     Leuk Esterase, UA Negative     Nitrite, UA Negative     Urobilinogen, UA 0.2 E.U./dL    Urinalysis, Microscopic Only - Urine, Clean Catch [[id number]]  (Abnormal) Collected: 12/11/23 2107    Specimen: Urine, Clean Catch Updated: 12/11/23 2123     RBC, UA 3-5 /HPF      WBC, UA 0-2 /HPF      Bacteria, UA 1+ /HPF      Squamous Epithelial Cells, UA 3-6 /HPF      Hyaline Casts, UA None Seen /LPF      Mucus, UA Small/1+ /HPF      Methodology Manual Light Microscopy    Lactic Acid, Plasma [[id number]]  (Normal) Collected: 12/11/23 2108    Specimen: Blood Updated: 12/11/23 2134     Lactate 0.9 mmol/L     CBC & Differential [[id number]]  (Abnormal) Collected: 12/11/23 2109    Specimen: Blood Updated: 12/11/23 2118    Narrative:      The following orders were created for panel order CBC & Differential.  Procedure                               Abnormality         Status                     ---------                               -----------         ------                     CBC Auto Differential[[id number]]        Abnormal            Final result                 Please view results for these tests on the individual orders.    Comprehensive Metabolic Panel [[id number]]  (Abnormal) Collected: 12/11/23 2109    Specimen: Blood Updated: 12/11/23 2138     Glucose 104 mg/dL      BUN 18 mg/dL       "Creatinine 0.72 mg/dL      Sodium 136 mmol/L      Potassium 3.9 mmol/L      Chloride 100 mmol/L      CO2 23.9 mmol/L      Calcium 9.6 mg/dL      Total Protein 7.7 g/dL      Albumin 4.0 g/dL      ALT (SGPT) 29 U/L      AST (SGOT) 15 U/L      Alkaline Phosphatase 132 U/L      Total Bilirubin 0.3 mg/dL      Globulin 3.7 gm/dL      A/G Ratio 1.1 g/dL      BUN/Creatinine Ratio 25.0     Anion Gap 12.1 mmol/L      eGFR 113.4 mL/min/1.73     Narrative:      GFR Normal >60  Chronic Kidney Disease <60  Kidney Failure <15      Procalcitonin [[id number]]  (Normal) Collected: 12/11/23 2109    Specimen: Blood Updated: 12/11/23 2143     Procalcitonin 0.05 ng/mL     Narrative:      As a Marker for Sepsis (Non-Neonates):    1. <0.5 ng/mL represents a low risk of severe sepsis and/or septic shock.  2. >2 ng/mL represents a high risk of severe sepsis and/or septic shock.    As a Marker for Lower Respiratory Tract Infections that require antibiotic therapy:    PCT on Admission    Antibiotic Therapy       6-12 Hrs later    >0.5                Strongly Recommended  >0.25 - <0.5        Recommended   0.1 - 0.25          Discouraged              Remeasure/reassess PCT  <0.1                Strongly Discouraged     Remeasure/reassess PCT    As 28 day mortality risk marker: \"Change in Procalcitonin Result\" (>80% or <=80%) if Day 0 (or Day 1) and Day 4 values are available. Refer to http://www.[facility]-pct-calculator.com    Change in PCT <=80%  A decrease of PCT levels below or equal to 80% defines a positive change in PCT test result representing a higher risk for 28-day all-cause mortality of patients diagnosed with severe sepsis for septic shock.    Change in PCT >80%  A decrease of PCT levels of more than 80% defines a negative change in PCT result representing a lower risk for 28-day all-cause mortality of patients diagnosed with severe sepsis or septic shock.       Pregnancy, Urine - Urine, Clean Catch [[id number]]  (Normal) Collected: " 12/11/23 2109    Specimen: Urine, Clean Catch Updated: 12/11/23 2117     HCG, Urine QL Negative    CBC Auto Differential [[id number]]  (Abnormal) Collected: 12/11/23 2109    Specimen: Blood Updated: 12/11/23 2118     WBC 13.31 10*3/mm3      RBC 3.89 10*6/mm3      Hemoglobin 10.3 g/dL      Hematocrit 32.5 %      MCV 83.5 fL      MCH 26.5 pg      MCHC 31.7 g/dL      RDW 14.1 %      RDW-SD 42.5 fl      MPV 9.3 fL      Platelets 423 10*3/mm3      Neutrophil % 77.3 %      Lymphocyte % 11.9 %      Monocyte % 6.8 %      Eosinophil % 1.0 %      Basophil % 0.4 %      Immature Grans % 2.6 %      Neutrophils, Absolute 10.30 10*3/mm3      Lymphocytes, Absolute 1.58 10*3/mm3      Monocytes, Absolute 0.90 10*3/mm3      Eosinophils, Absolute 0.13 10*3/mm3      Basophils, Absolute 0.05 10*3/mm3      Immature Grans, Absolute 0.35 10*3/mm3      nRBC 0.0 /100 WBC              CT Abdomen Pelvis With Contrast    Result Date: 12/11/2023  FINAL REPORT TECHNIQUE: Axial CT images were performed from the lung bases through the symphysis pubis after the administration of intravenous contrast.  This study was performed with techniques to keep radiation doses as low as reasonably achievable (ALARA). Individualized dose reduction techniques using automated exposure control or adjustment of mA and/or kV according to the patient's size were employed. CLINICAL HISTORY: Recent abdominal procedures, fevers, abdominal pain FINDINGS: LOWER CHEST: The heart is normal size.  The lung bases are clear.  ABDOMEN/PELVIS:  Liver, gallbladder and bile ducts: The liver enhances homogeneously without suspicious focal hepatic lesion.  The gallbladder is unremarkable.  There is no definite biliary duct dilatation.  Adrenal glands: The adrenal glands are morphologically unremarkable without suspicious lesion. Kidneys, ureter and urinary bladder: No suspicious renal lesion. No hydronephrosis.   There is urinary bladder wall thickening. Spleen: The spleen is normal  size.  Pancreas: The pancreas is grossly unremarkable.  GI systems and mesentery: There are postoperative changes of the cecum.  No evidence of bowel obstruction.  The appendix is presumably surgically absent. No significant mesenteric inflammation.  Lymph nodes: No definite pathologically enlarged abdominal or pelvic lymph nodes present.  Vessels: The aorta and abdominal arteries are grossly patent.  The IVC and portal vein are patent and grossly unremarkable.  Peritoneum: No free intraperitoneal fluid or pneumoperitoneum.  Pelvic viscera: No acute findings.  Body wall: There is a small 2.4 cm x 1.3 cm fluid collection present within the anterior abdomen just deep to the laparotomy incision.  Bones: No acute fracture.     Impression: Possible early developing abscess in the anterior abdominal cavity.  Urinary bladder wall thickening, correlate with urinalysis for cystitis. Authenticated and Electronically Signed by Geoff Kiran MD on 12/11/2023 11:00:27 PM      Medical Decision Making  This is a 33-year-old female patient comes into the ED today complaining of having low-grade fever of 100.6.  Patient called her surgeon and was told to come to the emergency room for evaluation.  Patient has had a bowel resection approximately 9 days ago laparoscopically.  Patient has taken Tylenol prior to arrival.    DDX: includes but is not limited to: Abdominal abscess, peritonitis, viral upper respiratory illness, others    Problems Addressed:  Abdominal wall seroma, initial encounter: complicated acute illness or injury    Amount and/or Complexity of Data Reviewed  External Data Reviewed:      Details: I have personally reviewed labs, radiology EKG and notes from patient's chart  Labs: ordered. Decision-making details documented in ED Course.     Details: I have personally reviewed and documented all results  Radiology: ordered. Decision-making details documented in ED Course.     Details: I have personally reviewed and  documented all results  Discussion of management or test interpretation with external provider(s): Discussed assessment, treatment and plan with ER attending, Dr. De La Cruz, general surgery on-call    Risk  Prescription drug management.  Risk Details: I have discussed with patient the finding of the test preformed today. Patient has been diagnosed with abdominal wall seroma and will be discharged home.  Patient requested to follow-up with primary care provider, Dr. Esposito, general surgery within the next 7 days for reevaluation. Strict return precautions have been given and patient verbalizes understanding                  Final diagnoses:   Abdominal wall seroma, initial encounter          Gordon Todd, APRN  12/11/23 3809

## 2023-12-14 ENCOUNTER — HOSPITAL ENCOUNTER (EMERGENCY)
Facility: HOSPITAL | Age: 33
Discharge: HOME | End: 2023-12-14
Attending: EMERGENCY MEDICINE
Payer: MEDICAID

## 2023-12-14 VITALS
RESPIRATION RATE: 18 BRPM | TEMPERATURE: 98.1 F | OXYGEN SATURATION: 100 % | BODY MASS INDEX: 39.7 KG/M2 | SYSTOLIC BLOOD PRESSURE: 118 MMHG | HEIGHT: 66 IN | WEIGHT: 247 LBS | HEART RATE: 93 BPM | DIASTOLIC BLOOD PRESSURE: 64 MMHG

## 2023-12-14 DIAGNOSIS — L03.90 CELLULITIS, UNSPECIFIED CELLULITIS SITE: ICD-10-CM

## 2023-12-14 DIAGNOSIS — T14.8XXA WOUND INFECTION: Primary | ICD-10-CM

## 2023-12-14 DIAGNOSIS — L08.9 WOUND INFECTION: Primary | ICD-10-CM

## 2023-12-14 LAB
ALBUMIN SERPL-MCNC: 3.9 G/DL (ref 3.5–5.2)
ALBUMIN/GLOB SERPL: 1 G/DL
ALP SERPL-CCNC: 111 U/L (ref 39–117)
ALT SERPL W P-5'-P-CCNC: 25 U/L (ref 1–33)
ANION GAP SERPL CALCULATED.3IONS-SCNC: 10.9 MMOL/L (ref 5–15)
AST SERPL-CCNC: 16 U/L (ref 1–32)
BASOPHILS # BLD AUTO: 0.07 10*3/MM3 (ref 0–0.2)
BASOPHILS NFR BLD AUTO: 0.5 % (ref 0–1.5)
BILIRUB SERPL-MCNC: 0.4 MG/DL (ref 0–1.2)
BUN SERPL-MCNC: 11 MG/DL (ref 6–20)
BUN/CREAT SERPL: 15.7 (ref 7–25)
CALCIUM SPEC-SCNC: 9.6 MG/DL (ref 8.6–10.5)
CHLORIDE SERPL-SCNC: 102 MMOL/L (ref 98–107)
CO2 SERPL-SCNC: 23.1 MMOL/L (ref 22–29)
CREAT SERPL-MCNC: 0.7 MG/DL (ref 0.57–1)
D-LACTATE SERPL-SCNC: 1.1 MMOL/L (ref 0.5–2)
DEPRECATED RDW RBC AUTO: 43.1 FL (ref 37–54)
EGFRCR SERPLBLD CKD-EPI 2021: 117.3 ML/MIN/1.73
EOSINOPHIL # BLD AUTO: 0.11 10*3/MM3 (ref 0–0.4)
EOSINOPHIL NFR BLD AUTO: 0.8 % (ref 0.3–6.2)
ERYTHROCYTE [DISTWIDTH] IN BLOOD BY AUTOMATED COUNT: 14.3 % (ref 12.3–15.4)
GLOBULIN UR ELPH-MCNC: 4 GM/DL
GLUCOSE SERPL-MCNC: 97 MG/DL (ref 65–99)
HCT VFR BLD AUTO: 31 % (ref 34–46.6)
HGB BLD-MCNC: 10 G/DL (ref 12–15.9)
HOLD SPECIMEN: NORMAL
HOLD SPECIMEN: NORMAL
IMM GRANULOCYTES # BLD AUTO: 0.27 10*3/MM3 (ref 0–0.05)
IMM GRANULOCYTES NFR BLD AUTO: 1.9 % (ref 0–0.5)
LYMPHOCYTES # BLD AUTO: 1.59 10*3/MM3 (ref 0.7–3.1)
LYMPHOCYTES NFR BLD AUTO: 11 % (ref 19.6–45.3)
MCH RBC QN AUTO: 26.5 PG (ref 26.6–33)
MCHC RBC AUTO-ENTMCNC: 32.3 G/DL (ref 31.5–35.7)
MCV RBC AUTO: 82.2 FL (ref 79–97)
MONOCYTES # BLD AUTO: 1.1 10*3/MM3 (ref 0.1–0.9)
MONOCYTES NFR BLD AUTO: 7.6 % (ref 5–12)
NEUTROPHILS NFR BLD AUTO: 11.28 10*3/MM3 (ref 1.7–7)
NEUTROPHILS NFR BLD AUTO: 78.2 % (ref 42.7–76)
NRBC BLD AUTO-RTO: 0 /100 WBC (ref 0–0.2)
PLATELET # BLD AUTO: 391 10*3/MM3 (ref 140–450)
PMV BLD AUTO: 9.9 FL (ref 6–12)
POTASSIUM SERPL-SCNC: 3.7 MMOL/L (ref 3.5–5.2)
PROCALCITONIN SERPL-MCNC: 0.05 NG/ML (ref 0–0.25)
PROT SERPL-MCNC: 7.9 G/DL (ref 6–8.5)
RBC # BLD AUTO: 3.77 10*6/MM3 (ref 3.77–5.28)
SODIUM SERPL-SCNC: 136 MMOL/L (ref 136–145)
WBC NRBC COR # BLD AUTO: 14.42 10*3/MM3 (ref 3.4–10.8)
WHOLE BLOOD HOLD COAG: NORMAL
WHOLE BLOOD HOLD SPECIMEN: NORMAL

## 2023-12-14 PROCEDURE — 83605 ASSAY OF LACTIC ACID: CPT | Performed by: EMERGENCY MEDICINE

## 2023-12-14 PROCEDURE — 25010000002 LIDOCAINE 1 % SOLUTION: Performed by: EMERGENCY MEDICINE

## 2023-12-14 PROCEDURE — 25810000003 SODIUM CHLORIDE 0.9 % SOLUTION: Performed by: EMERGENCY MEDICINE

## 2023-12-14 PROCEDURE — 84145 PROCALCITONIN (PCT): CPT | Performed by: EMERGENCY MEDICINE

## 2023-12-14 PROCEDURE — 99024 POSTOP FOLLOW-UP VISIT: CPT | Performed by: STUDENT IN AN ORGANIZED HEALTH CARE EDUCATION/TRAINING PROGRAM

## 2023-12-14 PROCEDURE — 96366 THER/PROPH/DIAG IV INF ADDON: CPT

## 2023-12-14 PROCEDURE — 99283 EMERGENCY DEPT VISIT LOW MDM: CPT

## 2023-12-14 PROCEDURE — 85025 COMPLETE CBC W/AUTO DIFF WBC: CPT | Performed by: EMERGENCY MEDICINE

## 2023-12-14 PROCEDURE — 25010000002 VANCOMYCIN 5 G RECONSTITUTED SOLUTION: Performed by: EMERGENCY MEDICINE

## 2023-12-14 PROCEDURE — 10180 I&D COMPLEX PO WOUND INFCTJ: CPT | Performed by: STUDENT IN AN ORGANIZED HEALTH CARE EDUCATION/TRAINING PROGRAM

## 2023-12-14 PROCEDURE — 36415 COLL VENOUS BLD VENIPUNCTURE: CPT

## 2023-12-14 PROCEDURE — 87040 BLOOD CULTURE FOR BACTERIA: CPT | Performed by: EMERGENCY MEDICINE

## 2023-12-14 PROCEDURE — 96365 THER/PROPH/DIAG IV INF INIT: CPT

## 2023-12-14 PROCEDURE — 80053 COMPREHEN METABOLIC PANEL: CPT | Performed by: EMERGENCY MEDICINE

## 2023-12-14 RX ORDER — LIDOCAINE HYDROCHLORIDE 10 MG/ML
10 INJECTION, SOLUTION INFILTRATION; PERINEURAL ONCE
Status: COMPLETED | OUTPATIENT
Start: 2023-12-14 | End: 2023-12-14

## 2023-12-14 RX ORDER — NALOXONE HYDROCHLORIDE 4 MG/.1ML
SPRAY NASAL
Qty: 2 EACH | Refills: 0 | Status: SHIPPED | OUTPATIENT
Start: 2023-12-14

## 2023-12-14 RX ORDER — HYDROCODONE BITARTRATE AND ACETAMINOPHEN 5; 325 MG/1; MG/1
1 TABLET ORAL EVERY 6 HOURS PRN
Qty: 10 TABLET | Refills: 0 | Status: SHIPPED | OUTPATIENT
Start: 2023-12-14 | End: 2023-12-18

## 2023-12-14 RX ORDER — SULFAMETHOXAZOLE AND TRIMETHOPRIM 800; 160 MG/1; MG/1
1 TABLET ORAL 2 TIMES DAILY
Qty: 14 TABLET | Refills: 0 | Status: SHIPPED | OUTPATIENT
Start: 2023-12-14 | End: 2023-12-21

## 2023-12-14 RX ORDER — SODIUM CHLORIDE 0.9 % (FLUSH) 0.9 %
10 SYRINGE (ML) INJECTION AS NEEDED
Status: DISCONTINUED | OUTPATIENT
Start: 2023-12-14 | End: 2023-12-14 | Stop reason: HOSPADM

## 2023-12-14 RX ADMIN — LIDOCAINE HYDROCHLORIDE 10 ML: 10 INJECTION, SOLUTION INFILTRATION; PERINEURAL at 11:50

## 2023-12-14 RX ADMIN — VANCOMYCIN HYDROCHLORIDE 2500 MG: 5 INJECTION, POWDER, LYOPHILIZED, FOR SOLUTION INTRAVENOUS at 11:31

## 2023-12-14 RX ADMIN — SODIUM CHLORIDE 1000 ML: 9 INJECTION, SOLUTION INTRAVENOUS at 11:36

## 2023-12-14 NOTE — CONSULTS
Referring Provider: No ref. provider found    Reason for Consultation: Incision infection    Patient Care Team:  Jaqueline Hays APRN as PCP - General (Family Medicine)    Chief complaint   Chief Complaint   Patient presents with    Post-op Problem       SUBJECTIVE:    History of present illness:  Patient is a 33 year old female status post recent diagnostic laparoscopy converted to laparotomy with ileocecectomy on 23. Patient has done well postoperatively until three days ago when she developed low grade fevers, pain and erythema of her incision. She was seen in the ED at that time and started on PO ampicillin. Patient's symptoms worsened and she developed purulent drainage in the area so she returned to the ED. Otherwise, she has been eating and having normal bowel movements at home.     Review of Systems:    Review of Systems - General ROS: negative for - chills, fatigue, fever, hot flashes, malaise or night sweats  Psychological ROS: negative for - Depression or anxiety  HEENT ROS: negative for -  No nasal/oral pharynx drainage or pain. No acute visual complaints.  Respiratory ROS: negative for - Shortness of breath, cough or hemoptysis.  Cardiovascular ROS: negative for - Chest pain or palpitations. No edema  Gastrointestinal ROS: negative for - change in stools,  hematemesis, melena or stool incontinence  Genito-Urinary ROS: negative for - dysuria or hematuria  Musculoskeletal ROS: negative for - gait disturbance or muscle pain  Neurological ROS: negative for - dizziness, gait disturbance, memory loss, numbness/tingling or seizures  Skin: Erythema, pain, and drainage of midline incision    History  Past Medical History:   Diagnosis Date    Abnormal Pap smear of cervix     Hypertension     Pregnancy 2023    Recurrent pregnancy loss, antepartum condition or complication 2021/2022       Past Surgical History:   Procedure Laterality Date    ADENOIDECTOMY       SECTION        SECTION N/A 2023    Procedure:  SECTION REPEAT;  Surgeon: Bri Jimenez MD;  Location: [facility] OR;  Service: Obstetrics/Gynecology;  Laterality: N/A;    DIAGNOSTIC LAPAROSCOPY N/A 2023    Procedure: DIAGNOSTIC LAPAROSCOPY with lysis of adhesions, exploratory laparatomy with ileocecectomy;  Surgeon: Kelly Miranda DO;  Location: [facility] OR;  Service: General;  Laterality: N/A;       Family History   Problem Relation Age of Onset    Hypertension Father     Stroke Father     Hyperlipidemia Mother     Stroke Mother         Blood clots       Social History     Socioeconomic History    Marital status:     Number of children: 1    Highest education level: High school graduate   Tobacco Use    Smoking status: Never    Smokeless tobacco: Never   Vaping Use    Vaping Use: Never used   Substance and Sexual Activity    Alcohol use: No    Drug use: No    Sexual activity: Yes     Partners: Male     Birth control/protection: None       No Known Allergies    OBJECTIVE:    Medications  Current Facility-Administered Medications   Medication Dose Route Frequency Provider Last Rate Last Admin    sodium chloride 0.9 % bolus 1,000 mL  1,000 mL Intravenous Once Giltaiwo Dawit B, DO 2,000 mL/hr at 23 1136 1,000 mL at 23 1136    sodium chloride 0.9 % flush 10 mL  10 mL Intravenous PRN Paramjit, Dawit B, DO        vancomycin 2500 mg/500 mL 0.9% NS IVPB ([facility])  2,500 mg Intravenous Once Giltaiwo Dawit B, DO   2,500 mg at 23 1131     Current Outpatient Medications   Medication Sig Dispense Refill    acetaminophen (TYLENOL) 500 MG tablet Take 2 tablets by mouth Every 8 (Eight) Hours As Needed for Mild Pain. 60 tablet 0    amoxicillin-clavulanate (AUGMENTIN) 875-125 MG per tablet Take 1 tablet by mouth 2 (Two) Times a Day for 7 days. 14 tablet 0    ferrous sulfate 325 (65 FE) MG tablet Take 1 tablet by mouth 2 (Two) Times a Day Before Meals. 60 tablet 10    HYDROcodone-acetaminophen (NORCO) 5-325  MG per tablet Take 1 tablet by mouth Every 6 (Six) Hours As Needed for Moderate Pain. 10 tablet 0    ibuprofen (ADVIL,MOTRIN) 800 MG tablet Take 1 tablet by mouth Every 8 (Eight) Hours As Needed for Mild Pain. 60 tablet 0    Prenatal Vit-Fe Fumarate-FA (Prenatal Vitamin) 27-0.8 MG tablet Take 1 tablet by mouth Daily. 30 tablet 12         Vital Signs   Temp:  [98.1 °F (36.7 °C)] 98.1 °F (36.7 °C)  Heart Rate:  [93] 93  Resp:  [18] 18  BP: (124)/(69) 124/69    Physical Exam:     General Appearance:  Alert and cooperative, not in any acute distress.   Head:  Atraumatic and normocephalic, without obvious abnormality.   Eyes:          PERRLA, conjunctivae and sclerae normal, no Icterus. No pallor. Extraocular movements are within normal limits.   Ears:  Ears appear intact with no abnormalities noted.   Throat: No oral lesions, no thrush, oral mucosa moist.   Neck: Supple, trachea midline, no thyromegaly, no carotid bruit.   Back:   No kyphoscoliosis present. No tenderness to palpation,   range of motion normal.   Respiratory/Lungs:   Breath sounds heard bilaterally equally.  No crackles or wheezing. No Pleural rub or bronchial breathing. Normal respiratory effort.    Cardiovascular/Heart:  Normal S1 and S2, no murmur. No edema   GI/Abdomen:   No masses, no hepatosplenomegaly. Soft, non-tender, non-distended, no hernia                 Musculoskeletal/ Extremities:   Moves all extremities well   Pulses: Pulses palpable and equal bilaterally   Skin: Midline laparotomy incision with intact staples, about mid incision there is purulent drainage and surrounding erythema of the skin, tender to palpation, the remainder to the incision is well-healing   Lymph nodes: No palpable adenopathy   Psychiatric : Alert and oriented ×3.  No depression or anxiety    Neurologic: Cranial nerves 2 - 12 grossly intact, sensation intact, Motor power is normal and equal bilaterally.     Results Review:  Lab Results (last 24 hours)       Procedure  Component Value Units Date/Time    Blood Culture With ORA - Blood, Hand, Right [[id number]] Collected: 12/14/23 1138    Specimen: Blood from Hand, Right Updated: 12/14/23 1152    Blood Culture With ORA - Blood, Hand, Left [[id number]] Collected: 12/14/23 1147    Specimen: Blood from Hand, Left Updated: 12/14/23 1152    Lactic Acid, Plasma [[id number]] Collected: 12/14/23 1138    Specimen: Blood Updated: 12/14/23 1152    [place] Draw [[id number]] Collected: 12/14/23 1030    Specimen: Blood Updated: 12/14/23 1145    Narrative:      The following orders were created for panel order [place] Draw.  Procedure                               Abnormality         Status                     ---------                               -----------         ------                     Green Top (Gel)[[id number]]                                  Final result               Lavender Top[[id number]]                                     Final result               Gold Top - SST[[id number]]                                   Final result               Light Blue Top[[id number]]                                   Final result                 Please view results for these tests on the individual orders.    Green Top (Gel) [[id number]] Collected: 12/14/23 1030    Specimen: Blood Updated: 12/14/23 1145     Extra Tube Hold for add-ons.     Comment: Auto resulted.       Lavender Top [[id number]] Collected: 12/14/23 1030    Specimen: Blood Updated: 12/14/23 1145     Extra Tube hold for add-on     Comment: Auto resulted       Gold Top - SST [[id number]] Collected: 12/14/23 1030    Specimen: Blood Updated: 12/14/23 1145     Extra Tube Hold for add-ons.     Comment: Auto resulted.       Light Blue Top [[id number]] Collected: 12/14/23 1030    Specimen: Blood Updated: 12/14/23 1145     Extra Tube Hold for add-ons.     Comment: Auto resulted       Comprehensive Metabolic Panel [[id number]] Collected: 12/14/23 1030    Specimen: Blood Updated: 12/14/23 1136     Glucose 97  mg/dL      BUN 11 mg/dL      Creatinine 0.70 mg/dL      Sodium 136 mmol/L      Potassium 3.7 mmol/L      Chloride 102 mmol/L      CO2 23.1 mmol/L      Calcium 9.6 mg/dL      Total Protein 7.9 g/dL      Albumin 3.9 g/dL      ALT (SGPT) 25 U/L      AST (SGOT) 16 U/L      Alkaline Phosphatase 111 U/L      Total Bilirubin 0.4 mg/dL      Globulin 4.0 gm/dL      A/G Ratio 1.0 g/dL      BUN/Creatinine Ratio 15.7     Anion Gap 10.9 mmol/L      eGFR 117.3 mL/min/1.73     Narrative:      GFR Normal >60  Chronic Kidney Disease <60  Kidney Failure <15      CBC & Differential [[id number]]  (Abnormal) Collected: 12/14/23 1030    Specimen: Blood Updated: 12/14/23 1121    Narrative:      The following orders were created for panel order CBC & Differential.  Procedure                               Abnormality         Status                     ---------                               -----------         ------                     CBC Auto Differential[[id number]]        Abnormal            Final result                 Please view results for these tests on the individual orders.    CBC Auto Differential [[id number]]  (Abnormal) Collected: 12/14/23 1030    Specimen: Blood Updated: 12/14/23 1121     WBC 14.42 10*3/mm3      RBC 3.77 10*6/mm3      Hemoglobin 10.0 g/dL      Hematocrit 31.0 %      MCV 82.2 fL      MCH 26.5 pg      MCHC 32.3 g/dL      RDW 14.3 %      RDW-SD 43.1 fl      MPV 9.9 fL      Platelets 391 10*3/mm3      Neutrophil % 78.2 %      Lymphocyte % 11.0 %      Monocyte % 7.6 %      Eosinophil % 0.8 %      Basophil % 0.5 %      Immature Grans % 1.9 %      Neutrophils, Absolute 11.28 10*3/mm3      Lymphocytes, Absolute 1.59 10*3/mm3      Monocytes, Absolute 1.10 10*3/mm3      Eosinophils, Absolute 0.11 10*3/mm3      Basophils, Absolute 0.07 10*3/mm3      Immature Grans, Absolute 0.27 10*3/mm3      nRBC 0.0 /100 WBC     Procalcitonin [[id number]] Collected: 12/14/23 1030    Specimen: Blood Updated: 12/14/23 1121             ASSESSMENT/PLAN:    Incision abscess    Patient was seen and examined in the emergency department and it appears that she has developed an abscess of her midline incision. I recommend incision and drainage at bedside. I discussed with the patient that we will remove some staples and open a small portion of the incision and evacuate the infection. She is agreeable to this. I will place packing into the wound. Patient has an appointment with me tomorrow in office and I will see her and remove her packing at that time. I also recommend that we change her PO antibiotics to something with MRSA coverage. Patient expresses understanding and all questions were answered.    I discussed the patients findings and my recommendations with patient    Kelly Miranda, DO  12/14/23  12:04 EST    Procedure    I recommended abscess drainage to the patient. I explained the indication as well as the risks and benefits which include bleeding, further infection requiring additional procedures, non healing of the wound etc. The patient understands these and wishes to proceed.    Four staples were removed in the area with erythema. 1% lidocaine with epinephrine was infused locally around the incision. The incision was opened with a #11 blade where it was draining purulent fluid. Purulent contents were evacuated and irrigated with saline and any loculations were broken up with a q-tip. The fascia was also probed and found to be intact. Minimal blood loss had occurred and was well controlled with pressure. 1/2 inch iodoform packing was placed. There were no complications and the patient tolerated the procedure well. Wound instructions were given.

## 2023-12-14 NOTE — PROGRESS NOTES
Patient: Lauren Carrasco    YOB: 1990    Date: 12/15/2023    Primary Care Provider: Jaqueline Hays APRN    Reason for Consultation: Follow-up colonoscopy    Chief Complaint   Patient presents with    Post-op     diagnostic laparoscopy       History of present illness:  I saw the patient in the office today as a followup from their recent diagnostic laparoscopy, extensive lysis of adhesions, midline laparotomy, ileocecectomy with ileocolonic anastomosis the pathology report did show colonic mucosa with transmural inflammation and perforation with associated serositis and abscess formation. Four days ago, patient did develop redness, pain, and drainage of her midline incision. An incision and drainage was performed in the ED yesterday and packing was placed. Patient is being seen today for follow up from surgery and for her wound. She states that it feels better than yesterday and she has not had any more fevers. She has not started the Bactrim yet because it wasn't available yet at her pharmacy.     The following portions of the patient's history were reviewed and updated as appropriate: allergies, current medications, past family history, past medical history, past social history, past surgical history and problem list.    Review of Systems   Constitutional:  Negative for chills, fever and unexpected weight change.   HENT:  Negative for hearing loss, trouble swallowing and voice change.    Eyes:  Negative for visual disturbance.   Respiratory:  Negative for apnea, cough, chest tightness, shortness of breath and wheezing.    Cardiovascular:  Negative for chest pain, palpitations and leg swelling.   Gastrointestinal:  Negative for abdominal distention, abdominal pain, anal bleeding, blood in stool, constipation, diarrhea, nausea, rectal pain and vomiting.   Endocrine: Negative for cold intolerance and heat intolerance.   Genitourinary:  Negative for difficulty urinating, dysuria and  flank pain.   Musculoskeletal:  Negative for back pain and gait problem.   Skin:  Negative for color change, rash and wound.   Neurological:  Negative for dizziness, syncope, speech difficulty, weakness, light-headedness, numbness and headaches.   Hematological:  Negative for adenopathy. Does not bruise/bleed easily.   Psychiatric/Behavioral:  Negative for confusion. The patient is not nervous/anxious.        Allergies:  No Known Allergies    Medications:    Current Outpatient Medications:     acetaminophen (TYLENOL) 500 MG tablet, Take 2 tablets by mouth Every 8 (Eight) Hours As Needed for Mild Pain., Disp: 60 tablet, Rfl: 0    ferrous sulfate 325 (65 FE) MG tablet, Take 1 tablet by mouth 2 (Two) Times a Day Before Meals., Disp: 60 tablet, Rfl: 10    HYDROcodone-acetaminophen (NORCO) 5-325 MG per tablet, Take 1 tablet by mouth Every 6 (Six) Hours As Needed for Severe Pain., Disp: 10 tablet, Rfl: 0    ibuprofen (ADVIL,MOTRIN) 800 MG tablet, Take 1 tablet by mouth Every 8 (Eight) Hours As Needed for Mild Pain., Disp: 60 tablet, Rfl: 0    naloxone (NARCAN) 4 MG/0.1ML nasal spray, Call 911. Don't prime. [place] in 1 nostril for overdose. Repeat in 2-3 minutes in other nostril if no or minimal breathing/responsiveness., Disp: 2 each, Rfl: 0    Prenatal Vit-Fe Fumarate-FA (Prenatal Vitamin) 27-0.8 MG tablet, Take 1 tablet by mouth Daily., Disp: 30 tablet, Rfl: 12    sulfamethoxazole-trimethoprim (BACTRIM DS,SEPTRA DS) 800-160 MG per tablet, Take 1 tablet by mouth 2 (Two) Times a Day for 7 days., Disp: 14 tablet, Rfl: 0    amoxicillin-clavulanate (AUGMENTIN) 875-125 MG per tablet, Take 1 tablet by mouth 2 (Two) Times a Day for 7 days. (Patient not taking: Reported on 12/15/2023), Disp: 14 tablet, Rfl: 0  No current facility-administered medications for this visit.    Vital Signs:  Vitals:    12/15/23 1008   BP: 122/64   Pulse: 92   Resp: 18   Temp: 97.5 °F (36.4 °C)   TempSrc: Temporal   SpO2: 100%   Weight: 109 kg (240 lb  "9.6 oz)   Height: 166.4 cm (65.5\")       Physical Exam:   General Appearance:    Alert, cooperative, in no acute distress   Abdomen:    Obese, soft, mildly tender around incisional wound. Erythema significantly improved from yesterday. Still some purulent drainage, packing in place. Staples in place.    Chest:      Clear to ausculation            Heart:     Regular rate and rhythm    Results Review:   None    Assessment / Plan:    1. Incisional abscess      Patient was seen today as a post operative visit status post laparotomy, lysis of adhesions, and ileocecectomy. She did unfortunately develop an abscess of her midline laparotomy incision after discharge from the hospital. An incision and drainage was performed yesterday and packing was placed. Packing was removed in office today and the wound was further irrigated with saline and peroxide. The wound was probed again and the underlying fascia is healthy and intact. The packing was replaced. Erythema and pain has also significantly improved. Patient will start her Bactrim today. We will order home health to see the patient and assist with wound care. She will follow up next week with one of my partners in office and then I will see her on 12/29/23 to continue to monitor healing. Patient expresses understanding and all of her questions were answered today.     Electronically signed by Kelly Miranda,   12/15/23                "

## 2023-12-14 NOTE — ED PROVIDER NOTES
Subjective   History of Present Illness  33-year-old female presents to the ED with a chief complaint of postop problem.  Patient had a procedure performed 12 days ago.  She had a diagnostic laparoscopically with lysis of adhesions exploratory laparotomy and iliocecectomy.  Patient was seen and evaluated here 3 days ago for some abdominal wall discomfort following the surgery.  She was found to have either an abscess or seroma was started on antibiotics.  She states that the wound is not getting any better.  She has now developed redness and pain around the wound and today she got out of the shower and the wound is draining purulent material.      Review of Systems   Skin:  Positive for wound.   All other systems reviewed and are negative.      Past Medical History:   Diagnosis Date    Abnormal Pap smear of cervix     Hypertension     Pregnancy 2023    Recurrent pregnancy loss, antepartum condition or complication        No Known Allergies    Past Surgical History:   Procedure Laterality Date    ADENOIDECTOMY       SECTION       SECTION N/A 2023    Procedure:  SECTION REPEAT;  Surgeon: Bri Jimenez MD;  Location: [facility];  Service: Obstetrics/Gynecology;  Laterality: N/A;    DIAGNOSTIC LAPAROSCOPY N/A 2023    Procedure: DIAGNOSTIC LAPAROSCOPY with lysis of adhesions, exploratory laparatomy with ileocecectomy;  Surgeon: Kelly Miranda DO;  Location: [facility];  Service: General;  Laterality: N/A;       Family History   Problem Relation Age of Onset    Hypertension Father     Stroke Father     Hyperlipidemia Mother     Stroke Mother         Blood clots       Social History     Socioeconomic History    Marital status:     Number of children: 1    Highest education level: High school graduate   Tobacco Use    Smoking status: Never    Smokeless tobacco: Never   Vaping Use    Vaping Use: Never used   Substance and Sexual Activity    Alcohol use: No     Drug use: No    Sexual activity: Yes     Partners: Male     Birth control/protection: None           Objective   Physical Exam  Vitals and nursing note reviewed.   Constitutional:       General: She is not in acute distress.     Appearance: She is well-developed. She is not diaphoretic.   HENT:      Head: Normocephalic and atraumatic.      Nose: Nose normal.   Eyes:      Conjunctiva/sclera: Conjunctivae normal.   Cardiovascular:      Rate and Rhythm: Normal rate and regular rhythm.   Pulmonary:      Effort: Pulmonary effort is normal. No respiratory distress.      Breath sounds: Normal breath sounds.   Abdominal:      General: There is no distension.      Palpations: Abdomen is soft.      Tenderness: There is no abdominal tenderness. There is no guarding.      Comments: Abdominal wall surgical wound midline supra and lower umbilical, surrounding erythema and drainage of mucopurulent material.   Musculoskeletal:         General: No deformity.   Neurological:      Mental Status: She is alert and oriented to person, place, and time.      Cranial Nerves: No cranial nerve deficit.         Procedures           ED Course                                             Medical Decision Making  Problems Addressed:  Cellulitis, unspecified cellulitis site: complicated acute illness or injury  Wound infection: complicated acute illness or injury    Amount and/or Complexity of Data Reviewed  Labs: ordered.    Risk  Prescription drug management.      Data interpreted: Nursing notes reviewed vital signs reviewed Labs independently interpreted interpretative by me.  Imaging interpretative by me.  EKG independently interpreted by me    Oxygen saturations included.    Counseling discussed the results above with the patient regarding need for admission or discharge.  Patient understands and agrees with plan of care    Patient with postoperative wound infection.  Labs show slightly elevated white blood cell count.  Did discuss with the  patient's surgeon who evaluated the patient at bedside and did a bedside I&D.  Recommends discharging patient on Bactrim.  Patient has follow-up tomorrow and office with the surgeon.    Final diagnoses:   Wound infection   Cellulitis, unspecified cellulitis site       ED Disposition  ED Disposition       ED Disposition   Discharge    Condition   Stable    Comment   --               Jaqueline Hays, APRN  [address]  [place] [id number] [phone]          Kelly Miranda, DO  [address] [facility]  [address]  [place] [id number] [phone]    Schedule an appointment as soon as possible for a visit            Medication List        New Prescriptions      naloxone 4 MG/0.1ML nasal spray  Commonly known as: NARCAN  Call 911. Don't prime. [place] in 1 nostril for overdose. Repeat in 2-3 minutes in other nostril if no or minimal breathing/responsiveness.     sulfamethoxazole-trimethoprim 800-160 MG per tablet  Commonly known as: BACTRIM DS,SEPTRA DS  Take 1 tablet by mouth 2 (Two) Times a Day for 7 days.            Changed      HYDROcodone-acetaminophen 5-325 MG per tablet  Commonly known as: NORCO  Take 1 tablet by mouth Every 6 (Six) Hours As Needed for Severe Pain.  What changed: reasons to take this               Where to Get Your Medications        These medications were sent to [id number] - [place], [place] - [address] AT [facility] - [phone] PH - [phone] FX  [address], [facility] [address]      Phone: [phone]   HYDROcodone-acetaminophen 5-325 MG per tablet  naloxone 4 MG/0.1ML nasal spray  sulfamethoxazole-trimethoprim 800-160 MG per tablet            Dawit Yusuf, DO  12/14/23 8160

## 2023-12-15 ENCOUNTER — OFFICE VISIT (OUTPATIENT)
Dept: SURGERY | Facility: CLINIC | Age: 33
End: 2023-12-15
Payer: MEDICAID

## 2023-12-15 VITALS
RESPIRATION RATE: 18 BRPM | DIASTOLIC BLOOD PRESSURE: 64 MMHG | SYSTOLIC BLOOD PRESSURE: 122 MMHG | WEIGHT: 240.6 LBS | HEART RATE: 92 BPM | HEIGHT: 66 IN | TEMPERATURE: 97.5 F | OXYGEN SATURATION: 100 % | BODY MASS INDEX: 38.67 KG/M2

## 2023-12-15 DIAGNOSIS — T81.49XA INCISIONAL ABSCESS: Primary | ICD-10-CM

## 2023-12-15 DIAGNOSIS — Z98.890 STATUS POST LAPAROTOMY: ICD-10-CM

## 2023-12-15 PROCEDURE — 99024 POSTOP FOLLOW-UP VISIT: CPT | Performed by: STUDENT IN AN ORGANIZED HEALTH CARE EDUCATION/TRAINING PROGRAM

## 2023-12-15 NOTE — PROGRESS NOTES
"Patient: Lauren Carrasco    YOB: 1990    Date: 12/18/2023    Primary Care Provider: Jaqueline Hays APRN    Chief Complaint   Patient presents with    Post-op     laparoscopy.       History of present illness:  Patient seen today for a wound recheck. She unfortunately developed an incisional abscess which was drained 12/14/23. She started Bactrim 12/15/23. The wound has been irrigated and packed. She reports improvement in pain and symptoms, but there is persistent drainage of the wound on the packing/guaze. Plan was for home care to do dressing changes, but her insurance will not cover it.     Vital Signs:   Vitals:    12/18/23 1305   BP: 122/70   Pulse: 76   Resp: 18   Temp: 97.1 °F (36.2 °C)   TempSrc: Temporal   SpO2: 100%   Weight: 110 kg (243 lb)   Height: 165.1 cm (65\")       Physical Exam:   General Appearance:    Alert, cooperative, in no acute distress   Abdomen:     no masses, no organomegaly, soft non-tender, non-distended, no guarding, small 1 cm opening in middle of laparotomy incision, no surrounding erythema or signs of infection, packing in place.     Assessment / Plan:    1. Incisional abscess      Patient was seen today for a wound recheck. Unfortunately, she did develop an abscess of her laparotomy incision that required I&D. Since then, she has been doing better. Pain is minimal and she has not been febrile since. She was started on Bactrim and will continue this course until complete. The plan was for home care to do dressing changes, however, this was not covered by her insurance. Patient is apprehensive about doing packing and dressing changes herself. She will see if her  would be able to on Wednesday. Patient will follow up on Friday with my partner Dr. Perez for a wound recheck. Packing may be able to be discontinued at that time if continued improvement. Patient is agreeable to the plan and all questions were answered.     Electronically signed " by Kelly Miranda, DO  12/18/23

## 2023-12-18 ENCOUNTER — OFFICE VISIT (OUTPATIENT)
Dept: SURGERY | Facility: CLINIC | Age: 33
End: 2023-12-18
Payer: MEDICAID

## 2023-12-18 ENCOUNTER — POSTPARTUM VISIT (OUTPATIENT)
Dept: OBSTETRICS AND GYNECOLOGY | Facility: CLINIC | Age: 33
End: 2023-12-18
Payer: MEDICAID

## 2023-12-18 VITALS
HEIGHT: 65 IN | WEIGHT: 243 LBS | DIASTOLIC BLOOD PRESSURE: 70 MMHG | TEMPERATURE: 97.1 F | SYSTOLIC BLOOD PRESSURE: 122 MMHG | OXYGEN SATURATION: 100 % | RESPIRATION RATE: 18 BRPM | BODY MASS INDEX: 40.48 KG/M2 | HEART RATE: 76 BPM

## 2023-12-18 VITALS
WEIGHT: 248 LBS | BODY MASS INDEX: 41.32 KG/M2 | SYSTOLIC BLOOD PRESSURE: 112 MMHG | DIASTOLIC BLOOD PRESSURE: 70 MMHG | HEIGHT: 65 IN

## 2023-12-18 DIAGNOSIS — T81.49XA INCISIONAL ABSCESS: Primary | ICD-10-CM

## 2023-12-18 PROBLEM — Z34.90 PREGNANCY: Status: RESOLVED | Noted: 2023-11-02 | Resolved: 2023-12-18

## 2023-12-18 PROBLEM — Z98.891 HX OF CESAREAN SECTION: Status: RESOLVED | Noted: 2023-05-02 | Resolved: 2023-12-18

## 2023-12-18 PROBLEM — Z98.891 S/P CESAREAN SECTION: Status: RESOLVED | Noted: 2023-11-07 | Resolved: 2023-12-18

## 2023-12-18 PROBLEM — Z98.891 PREVIOUS CESAREAN SECTION: Status: RESOLVED | Noted: 2023-10-16 | Resolved: 2023-12-18

## 2023-12-18 PROCEDURE — 99024 POSTOP FOLLOW-UP VISIT: CPT | Performed by: STUDENT IN AN ORGANIZED HEALTH CARE EDUCATION/TRAINING PROGRAM

## 2023-12-18 NOTE — PROGRESS NOTES
"History  Chief Complaint   Patient presents with    Postpartum Care     No Complaints/concerns         Lauren Carrasco is a 33 y.o. year old  presenting to be seen for her postpartum visit.  She had a Repeat  (LTCS).  She was readmitted to hospital and had abdominal surgery on 23.     Since delivery she has been sexually active. She has not used condoms.  She does not have concerns about post-partum blues/depression.   She is bottle feeding.  For ongoing contraception, her plans are condoms.  She has not had a menstrual cycle.         Physical  /70   Ht 165.1 cm (65\")   Wt 112 kg (248 lb)   LMP  (LMP Unknown)   BMI 41.27 kg/m²     General:  well developed; well nourished  no acute distress   Abdomen: soft, non-tender; no masses  incision is healed and she has additional incision with bandage above umbilicus   Pelvis: External genitalia:  normal appearance of the external genitalia including Bartholin's and Olivia's glands.  Uterus:  normal size, shape and consistency.  Adnexa:  normal bimanual exam of the adnexa.        Assessment   Normal 6 week postpartum exam  Contraceptive management     Plan   BC options reviewed and compared today: condoms  Pap smear normal 23  Follow up 1 year    No orders of the defined types were placed in this encounter.         This note was electronically signed.  ADAM Gonzales  2023  "

## 2023-12-19 LAB
BACTERIA SPEC AEROBE CULT: NORMAL
BACTERIA SPEC AEROBE CULT: NORMAL

## 2023-12-20 ENCOUNTER — POSTPARTUM VISIT (OUTPATIENT)
Dept: OBSTETRICS AND GYNECOLOGY | Facility: CLINIC | Age: 33
End: 2023-12-20
Payer: MEDICAID

## 2023-12-20 ENCOUNTER — TELEPHONE (OUTPATIENT)
Dept: SURGERY | Facility: CLINIC | Age: 33
End: 2023-12-20
Payer: MEDICAID

## 2023-12-20 VITALS
DIASTOLIC BLOOD PRESSURE: 68 MMHG | SYSTOLIC BLOOD PRESSURE: 100 MMHG | BODY MASS INDEX: 40.69 KG/M2 | HEIGHT: 65 IN | WEIGHT: 244.2 LBS

## 2023-12-20 NOTE — PROGRESS NOTES
Subjective   Chief Complaint   Patient presents with    Postpartum Care     6 Weeks postpartum, crying all the time.      Lauren Carrasco is a 33 y.o. year old .  No LMP recorded (lmp unknown).  She presents to be seen because of PP blues- lots of crying picked up over the last couple of weeks.  Patient had  6 weeks ago followed by bowel resection secondary to abscess and now has subsequent incision site infection.  She denies harm to herself or others.  States baby is doing well.  Everything at home is otherwise good.    OTHER COMPLAINTS:  Nothing else    The following portions of the patient's history were reviewed and updated as appropriate:She  has a past medical history of Abnormal Pap smear of cervix, Hypertension, Pregnancy (2023), and Recurrent pregnancy loss, antepartum condition or complication (2021/2022).  She does not have any pertinent problems on file.  She  has a past surgical history that includes Adenoidectomy;  section;  section (N/A, 2023); and Laparoscopy (N/A, 2023).  Her family history includes Hyperlipidemia in her mother; Hypertension in her father; Stroke in her father and mother.  She  reports that she has never smoked. She has never used smokeless tobacco. She reports that she does not drink alcohol and does not use drugs.  Current Outpatient Medications   Medication Sig Dispense Refill    naloxone (NARCAN) 4 MG/0.1ML nasal spray Call 911. Don't prime. [place] in 1 nostril for overdose. Repeat in 2-3 minutes in other nostril if no or minimal breathing/responsiveness. 2 each 0    Prenatal Vit-Fe Fumarate-FA (Prenatal Vitamin) 27-0.8 MG tablet Take 1 tablet by mouth Daily. 30 tablet 12    sulfamethoxazole-trimethoprim (BACTRIM DS,SEPTRA DS) 800-160 MG per tablet Take 1 tablet by mouth 2 (Two) Times a Day for 7 days. 14 tablet 0     No current facility-administered medications for this visit.     Current Outpatient  "Medications on File Prior to Visit   Medication Sig    naloxone (NARCAN) 4 MG/0.1ML nasal spray Call 911. Don't prime. [place] in 1 nostril for overdose. Repeat in 2-3 minutes in other nostril if no or minimal breathing/responsiveness.    Prenatal Vit-Fe Fumarate-FA (Prenatal Vitamin) 27-0.8 MG tablet Take 1 tablet by mouth Daily.    sulfamethoxazole-trimethoprim (BACTRIM DS,SEPTRA DS) 800-160 MG per tablet Take 1 tablet by mouth 2 (Two) Times a Day for 7 days.     No current facility-administered medications on file prior to visit.     She has No Known Allergies.    Social History    Tobacco Use      Smoking status: Never      Smokeless tobacco: Never    Review of Systems  Consitutional POS: see HPI    NEG: anorexia or night sweats   Gastointestinal POS: nothing reported    NEG: bloating, change in bowel habits, melena, or reflux symptoms   Genitourinary POS: nothing reported    NEG: dysuria or hematuria   Integument POS: nothing reported    NEG: moles that are changing in size, shape, color or rashes   Breast POS: nothing reported    NEG: persistent breast lump, skin dimpling, or nipple discharge         Respiratory: negative  Cardiovascular: negative  GYN:  negative          Objective   /68   Ht 165.1 cm (65\")   Wt 111 kg (244 lb 3.2 oz)   LMP  (LMP Unknown)   BMI 40.64 kg/m²     General:  well developed; well nourished  no acute distress   Skin:  No suspicious lesions seen   Thyroid: not examined   Lungs:  breathing is unlabored   Heart:  Not performed.   Breasts:  Not performed.   Abdomen: soft, non-tender; no masses  no umbilical or inguinal hernias are present  no hepato-splenomegaly   section incision healed and intact.  Open granulating incision superiorly abdomen from prior bowel surgery   Pelvis: Not performed.     Psychiatric: Alert and oriented ×3, mood and affect appropriate  HEENT: Atraumatic, normocephalic, normal scleral icterus  Extremities: 2+ pulses bilaterally, no edema      Lab " Review   No data reviewed    Imaging   No data reviewed        Assessment   Postpartum depression: Definitely think this is situationally dependent secondary to everything patient's been through over the last several weeks.  Her mood and affect are appropriate today in the office.     Plan   Will start patient on Zoloft 50 mg a day.  Call back for reassessment in 4 weeks otherwise follow-up in 6 weeks.  If patient is overall doing much better she does not have to come back for the next appointment.  Precautions given.      No orders of the defined types were placed in this encounter.         This note was electronically signed.      December 20, 2023

## 2023-12-20 NOTE — TELEPHONE ENCOUNTER
Per Dr. Miranda, pt informed that she may shower, specific instructions given per Dr. Gonzalez reply to initial message.

## 2023-12-20 NOTE — PROGRESS NOTES
"Patient: Lauren Carrasco    YOB: 1990    Date: 12/22/2023    Primary Care Provider: Jaqueline Hays APRN    Chief Complaint   Patient presents with    Post-op       History of present illness:  I saw the patient in the office today as a followup from their recent incision and drainage of an post operative abscess done by Dr. Miranda.  They state that the incision is draining minimally and she states that she is much improved.  No pain.    Vital Signs:  Vitals:    12/22/23 1006   BP: 98/70   Pulse: 97   Temp: 97.8 °F (36.6 °C)   TempSrc: Temporal   SpO2: 98%   Weight: 111 kg (244 lb 9.6 oz)   Height: 165.1 cm (65\")       Physical Exam:   General Appearance:    Alert, cooperative, in no acute distress,    Abdomen:   Soft nontender nondistended.  Small superior wound with excellent granulation.  No significant drainage.  Packed.       Assessment / Plan:    1. Postoperative visit        Overall doing well.  Wound granulating in nicely.  I have asked her to just gently pack for the next several days and then discontinue with the packing and continue with topical dressings only until completely healed.  She will follow-up with Dr. Miranda in a week or 2..      Electronically signed by Toño Perez MD  12/22/23                    "

## 2023-12-20 NOTE — TELEPHONE ENCOUNTER
"Pt called the office via the HUB asking \"if she can shower at this time.\"  I informed pt that we will contact Dr. Miranda and let her know asap.  "

## 2023-12-21 ENCOUNTER — TELEPHONE (OUTPATIENT)
Dept: OBSTETRICS AND GYNECOLOGY | Facility: CLINIC | Age: 33
End: 2023-12-21
Payer: MEDICAID

## 2023-12-21 NOTE — TELEPHONE ENCOUNTER
Caller: Lauren Carrasco    Relationship: Self    Best call back number: [phone]    Which medication are you concerned about: ZOLOFT    Who prescribed you this medication: DR. HILTON    When did you start taking this medication: YESTERDAY    What are your concerns: THE MEDICATION MADE PATIENT FEEL WEIRD AND SHE WOULD LIKE TO KNOW IF DR. HILTON WOULD LIKE HER TO CONTINUE TAKING IT?    OK TO CALL ANYTIME  OK TO [facility]

## 2023-12-21 NOTE — TELEPHONE ENCOUNTER
Patient called stating that she took the first Zoloft pill yesterday and she felt weird, Panic attack and shakey , unable to sleep. I advised her that only one pill would probably not cause those feelings. But that I would send Dr a message to see if she should continue it.

## 2023-12-22 ENCOUNTER — OFFICE VISIT (OUTPATIENT)
Dept: SURGERY | Facility: CLINIC | Age: 33
End: 2023-12-22
Payer: MEDICAID

## 2023-12-22 VITALS
OXYGEN SATURATION: 98 % | HEIGHT: 65 IN | DIASTOLIC BLOOD PRESSURE: 70 MMHG | BODY MASS INDEX: 40.75 KG/M2 | WEIGHT: 244.6 LBS | HEART RATE: 97 BPM | TEMPERATURE: 97.8 F | SYSTOLIC BLOOD PRESSURE: 98 MMHG

## 2023-12-22 DIAGNOSIS — Z48.89 POSTOPERATIVE VISIT: Primary | ICD-10-CM

## 2023-12-22 PROCEDURE — 1160F RVW MEDS BY RX/DR IN RCRD: CPT | Performed by: SURGERY

## 2023-12-22 PROCEDURE — 99024 POSTOP FOLLOW-UP VISIT: CPT | Performed by: SURGERY

## 2023-12-22 PROCEDURE — 1159F MED LIST DOCD IN RCRD: CPT | Performed by: SURGERY

## 2023-12-22 NOTE — TELEPHONE ENCOUNTER
That is a pretty unusual reaction especially after just 1 dose.  If she wishes to discontinue I think it is fine but she may want to try another couple dosings on to see if this recurs.

## 2023-12-22 NOTE — TELEPHONE ENCOUNTER
Spoke with patient and she had the same reaction to the Zoloft on the second day of taking it. She has an appointment with her primary care Dr today and is going to see if they will change her to something different.

## 2023-12-29 NOTE — PROGRESS NOTES
Patient: Lauren Carrasco    YOB: 1990    Date: 01/05/2024    Primary Care Provider: Jaqueline Hays APRN    No chief complaint on file.      History of present illness:  I saw the patient in the office today as a followup from their recent diagnostic laparoscopy 12/2/23.  They state that they have done well and are having no complaints.    Vital Signs:   There were no vitals filed for this visit.    Physical Exam:   General Appearance:    Alert, cooperative, in no acute distress   Abdomen:     no masses, no organomegaly, soft non-tender, non-distended, no guarding, wounds are well healed     Assessment / Plan:    No diagnosis found.    I did discuss the situation with the patient today in the office and they have done well from their recent diagnostic laparoscopy.  I have released the patient back to normal activity, they understand that they need to be careful about heavy lifting.  I need to see the patient back in the office only if they are having further problems, they know to call me if they are.      Electronically signed by Celeste Prabhakar MA  12/29/23

## 2024-01-05 ENCOUNTER — OFFICE VISIT (OUTPATIENT)
Dept: SURGERY | Facility: CLINIC | Age: 34
End: 2024-01-05
Payer: MEDICAID

## 2024-01-05 VITALS
BODY MASS INDEX: 41.22 KG/M2 | HEART RATE: 79 BPM | HEIGHT: 65 IN | DIASTOLIC BLOOD PRESSURE: 64 MMHG | TEMPERATURE: 97.8 F | WEIGHT: 247.4 LBS | OXYGEN SATURATION: 100 % | SYSTOLIC BLOOD PRESSURE: 128 MMHG | RESPIRATION RATE: 18 BRPM

## 2024-01-05 DIAGNOSIS — T81.49XA INCISIONAL ABSCESS: ICD-10-CM

## 2024-01-05 DIAGNOSIS — Z48.89 POSTOPERATIVE VISIT: Primary | ICD-10-CM

## 2024-01-05 DIAGNOSIS — Z98.890 STATUS POST LAPAROTOMY: ICD-10-CM

## 2024-01-05 PROCEDURE — 1160F RVW MEDS BY RX/DR IN RCRD: CPT | Performed by: STUDENT IN AN ORGANIZED HEALTH CARE EDUCATION/TRAINING PROGRAM

## 2024-01-05 PROCEDURE — 1159F MED LIST DOCD IN RCRD: CPT | Performed by: STUDENT IN AN ORGANIZED HEALTH CARE EDUCATION/TRAINING PROGRAM

## 2024-01-05 PROCEDURE — 99024 POSTOP FOLLOW-UP VISIT: CPT | Performed by: STUDENT IN AN ORGANIZED HEALTH CARE EDUCATION/TRAINING PROGRAM

## 2024-01-05 RX ORDER — ESCITALOPRAM OXALATE 5 MG/1
1 TABLET ORAL DAILY
COMMUNITY
Start: 2023-12-22

## 2024-01-05 RX ORDER — HYDROXYZINE PAMOATE 25 MG/1
CAPSULE ORAL
COMMUNITY
Start: 2023-12-22

## 2024-01-05 NOTE — PROGRESS NOTES
"Patient: Lauren Carrasco    YOB: 1990    Date: 01/05/2024    Primary Care Provider: Jaqueline Hays APRN    Chief Complaint   Patient presents with    Post-op     Diagnostic laparoscopy        History of present illness:  I saw the patient in the office today as a followup from their recent diagnostic laparoscopy converted to laparotomy 12/2/23. She did develop an incisional abscess post operatively and her laparotomy incision has been healing via secondary intention. They state that they have done well and are having no complaints. No pain in the area, no further drainage.    Vital Signs:   Vitals:    01/05/24 0904   BP: 128/64   Pulse: 79   Resp: 18   Temp: 97.8 °F (36.6 °C)   TempSrc: Temporal   SpO2: 100%   Weight: 112 kg (247 lb 6.4 oz)   Height: 165.1 cm (65\")       Physical Exam:   General Appearance:    Alert, cooperative, in no acute distress   Abdomen:     no masses, no organomegaly, soft non-tender, non-distended, no guarding, wounds are well healed, area of previous abscess is closed, no evidence of ongoing infection     Assessment / Plan:    1. Postoperative visit    2. Incisional abscess    3. Status post laparotomy        I did discuss the situation with the patient today in the office and they have done. Her abscess has resolved and her incision is well healing.  I have released the patient back to normal activity, they understand that they need to be careful about heavy lifting for one more week.  I need to see the patient back in the office only if they are having further problems. We discussed signs and symptoms of recurrent infection and patient knows to call if she develops any of these.      Electronically signed by Kelly Miranda DO  01/05/24                  "

## 2024-03-01 ENCOUNTER — TELEPHONE (OUTPATIENT)
Dept: SURGERY | Facility: CLINIC | Age: 34
End: 2024-03-01
Payer: MEDICAID

## 2024-03-06 ENCOUNTER — TELEPHONE (OUTPATIENT)
Dept: SURGERY | Facility: CLINIC | Age: 34
End: 2024-03-06
Payer: MEDICAID

## 2024-03-06 DIAGNOSIS — Z98.890 STATUS POST LAPAROTOMY: Primary | ICD-10-CM

## 2024-03-06 RX ORDER — POLYETHYLENE GLYCOL 3350 17 G/17G
POWDER, FOR SOLUTION ORAL
Qty: 238 EACH | Refills: 0 | Status: SHIPPED | OUTPATIENT
Start: 2024-03-06

## 2024-03-06 RX ORDER — SODIUM CHLORIDE, SODIUM LACTATE, POTASSIUM CHLORIDE, CALCIUM CHLORIDE 600; 310; 30; 20 MG/100ML; MG/100ML; MG/100ML; MG/100ML
50 INJECTION, SOLUTION INTRAVENOUS CONTINUOUS
OUTPATIENT
Start: 2024-03-06

## 2024-03-06 RX ORDER — SODIUM CHLORIDE 0.9 % (FLUSH) 0.9 %
10 SYRINGE (ML) INJECTION EVERY 12 HOURS SCHEDULED
OUTPATIENT
Start: 2024-03-06

## 2024-03-06 RX ORDER — BISACODYL 5 MG/1
TABLET, DELAYED RELEASE ORAL
Qty: 4 TABLET | Refills: 0 | Status: SHIPPED | OUTPATIENT
Start: 2024-03-06

## 2024-03-06 RX ORDER — SODIUM CHLORIDE 0.9 % (FLUSH) 0.9 %
10 SYRINGE (ML) INJECTION AS NEEDED
OUTPATIENT
Start: 2024-03-06

## 2024-03-06 RX ORDER — SODIUM CHLORIDE 9 MG/ML
40 INJECTION, SOLUTION INTRAVENOUS AS NEEDED
OUTPATIENT
Start: 2024-03-06

## 2024-03-06 NOTE — TELEPHONE ENCOUNTER
Spoke with patient, scheduled a colonoscopy per Dr. Miranda at [facility] on 6/19/24. Pre-op form with bowel prep instructions mailed to home address. Bowel prep sent to [facility] in [place].

## 2024-03-07 PROBLEM — Z98.890 STATUS POST LAPAROTOMY: Status: ACTIVE | Noted: 2024-03-06

## 2024-06-18 ENCOUNTER — ANESTHESIA EVENT (OUTPATIENT)
Dept: GASTROENTEROLOGY | Facility: HOSPITAL | Age: 34
End: 2024-06-18
Payer: MEDICAID

## 2024-06-18 NOTE — PRE-PROCEDURE INSTRUCTIONS
PAT phone history completed with patient for upcoming procedure on 6/19/24 with Dr. Miranda.    PAT PASS reviewed with patient and they verbalize understanding of the following:     Do not eat or drink anything after midnight the night before procedure unless otherwise instructed by physician/surgeon's office, this includes no gum, candy, mints, tobacco products or e-cigarettes.  Do not shave the area to be operated on at least 48 hours prior to procedure.  Do not wear makeup, lotion, hair products, or nail polish.  Do not wear any jewelry and remove all piercings.  Do not wear any adhesive if you wear dentures.  Do not wear contacts; bring in glasses if needed.  Only take medications on the morning of procedure as instructed by PAT nurse per anesthesia guidelines or as instructed by physician's office.  If you are on any blood thinners reach out to the physician/surgeon's office for instructions on when/if they will need to be stopped prior to procedure.  Bring in picture ID and insurance card, advanced directive copies if applicable, CPAP/BIPAP/Inhalers if indicated morning of procedure, leave any other valuables at home.  Ensure you have arranged for someone to drive you home the day of your procedure and someone to care for you at home afterwards. It is recommended that you do not drive, drink alcohol, or make any major legal decisions for at least 24 hours after your procedure is complete.    Instructions given on hospital entrance and registration location.

## 2024-06-18 NOTE — ANESTHESIA PREPROCEDURE EVALUATION
Anesthesia Evaluation     Patient summary reviewed and Nursing notes reviewed   history of anesthetic complications:  PONV  NPO Solid Status: Waived due to emergency  NPO Liquid Status: Waived due to emergency           Airway   Mallampati: II  TM distance: >3 FB  Neck ROM: full  Possible difficult intubation  Dental - normal exam         Pulmonary - normal exam   (+) a smoker Former,  Cardiovascular - normal exam  Exercise tolerance: good (4-7 METS)    ECG reviewed    (+) hypertension      Neuro/Psych  (+) psychiatric history Depression  GI/Hepatic/Renal/Endo    (+) obesity, morbid obesity    Musculoskeletal (-) negative ROS    Abdominal   (-) obese   Substance History - negative use     OB/GYN negative ob/gyn ROS   (-)  Pregnant        Other - negative ROS       ROS/Med Hx Other: Labs reviewed                Anesthesia Plan    ASA 3 - emergent     MAC     (Risks and benefits discussed including risk of aspiration, recall and dental damage. All patient questions answered.    Will continue with plan of care.)  intravenous induction     Anesthetic plan, risks, benefits, and alternatives have been provided, discussed and informed consent has been obtained with: patient.  Pre-procedure education provided  Plan discussed with CRNA.      CODE STATUS:

## 2024-06-19 ENCOUNTER — ANESTHESIA (OUTPATIENT)
Dept: GASTROENTEROLOGY | Facility: HOSPITAL | Age: 34
End: 2024-06-19
Payer: MEDICAID

## 2024-06-19 ENCOUNTER — HOSPITAL ENCOUNTER (OUTPATIENT)
Facility: HOSPITAL | Age: 34
Setting detail: HOSPITAL OUTPATIENT SURGERY
Discharge: HOME | End: 2024-06-19
Attending: STUDENT IN AN ORGANIZED HEALTH CARE EDUCATION/TRAINING PROGRAM | Admitting: STUDENT IN AN ORGANIZED HEALTH CARE EDUCATION/TRAINING PROGRAM
Payer: MEDICAID

## 2024-06-19 VITALS
OXYGEN SATURATION: 100 % | DIASTOLIC BLOOD PRESSURE: 66 MMHG | HEIGHT: 66 IN | SYSTOLIC BLOOD PRESSURE: 113 MMHG | WEIGHT: 230 LBS | TEMPERATURE: 97 F | RESPIRATION RATE: 16 BRPM | HEART RATE: 69 BPM | BODY MASS INDEX: 36.96 KG/M2

## 2024-06-19 DIAGNOSIS — Z98.890 STATUS POST LAPAROTOMY: ICD-10-CM

## 2024-06-19 LAB
B-HCG UR QL: NEGATIVE
EXPIRATION DATE: NORMAL
INTERNAL NEGATIVE CONTROL: NORMAL
INTERNAL POSITIVE CONTROL: NORMAL
Lab: NORMAL

## 2024-06-19 PROCEDURE — 25810000003 LACTATED RINGERS PER 1000 ML: Performed by: STUDENT IN AN ORGANIZED HEALTH CARE EDUCATION/TRAINING PROGRAM

## 2024-06-19 PROCEDURE — 25010000002 MIDAZOLAM PER 1MG: Performed by: NURSE ANESTHETIST, CERTIFIED REGISTERED

## 2024-06-19 PROCEDURE — S0260 H&P FOR SURGERY: HCPCS | Performed by: STUDENT IN AN ORGANIZED HEALTH CARE EDUCATION/TRAINING PROGRAM

## 2024-06-19 PROCEDURE — 81025 URINE PREGNANCY TEST: CPT | Performed by: STUDENT IN AN ORGANIZED HEALTH CARE EDUCATION/TRAINING PROGRAM

## 2024-06-19 PROCEDURE — 25010000002 PROPOFOL 200 MG/20ML EMULSION: Performed by: NURSE ANESTHETIST, CERTIFIED REGISTERED

## 2024-06-19 PROCEDURE — 25010000002 FENTANYL CITRATE (PF) 50 MCG/ML SOLUTION PREFILLED SYRINGE: Performed by: NURSE ANESTHETIST, CERTIFIED REGISTERED

## 2024-06-19 PROCEDURE — 45380 COLONOSCOPY AND BIOPSY: CPT | Performed by: STUDENT IN AN ORGANIZED HEALTH CARE EDUCATION/TRAINING PROGRAM

## 2024-06-19 RX ORDER — LIDOCAINE HCL/PF 100 MG/5ML
SYRINGE (ML) INJECTION AS NEEDED
Status: DISCONTINUED | OUTPATIENT
Start: 2024-06-19 | End: 2024-06-19 | Stop reason: SURG

## 2024-06-19 RX ORDER — SODIUM CHLORIDE, SODIUM LACTATE, POTASSIUM CHLORIDE, CALCIUM CHLORIDE 600; 310; 30; 20 MG/100ML; MG/100ML; MG/100ML; MG/100ML
50 INJECTION, SOLUTION INTRAVENOUS CONTINUOUS
Status: DISCONTINUED | OUTPATIENT
Start: 2024-06-19 | End: 2024-06-19 | Stop reason: HOSPADM

## 2024-06-19 RX ORDER — FENTANYL CITRATE 50 UG/ML
INJECTION, SOLUTION INTRAMUSCULAR; INTRAVENOUS AS NEEDED
Status: DISCONTINUED | OUTPATIENT
Start: 2024-06-19 | End: 2024-06-19 | Stop reason: SURG

## 2024-06-19 RX ORDER — SODIUM CHLORIDE 0.9 % (FLUSH) 0.9 %
10 SYRINGE (ML) INJECTION EVERY 12 HOURS SCHEDULED
Status: DISCONTINUED | OUTPATIENT
Start: 2024-06-19 | End: 2024-06-19 | Stop reason: HOSPADM

## 2024-06-19 RX ORDER — MIDAZOLAM HYDROCHLORIDE 2 MG/2ML
INJECTION, SOLUTION INTRAMUSCULAR; INTRAVENOUS AS NEEDED
Status: DISCONTINUED | OUTPATIENT
Start: 2024-06-19 | End: 2024-06-19 | Stop reason: SURG

## 2024-06-19 RX ORDER — SODIUM CHLORIDE 0.9 % (FLUSH) 0.9 %
10 SYRINGE (ML) INJECTION AS NEEDED
Status: DISCONTINUED | OUTPATIENT
Start: 2024-06-19 | End: 2024-06-19 | Stop reason: HOSPADM

## 2024-06-19 RX ORDER — SODIUM CHLORIDE 9 MG/ML
40 INJECTION, SOLUTION INTRAVENOUS AS NEEDED
Status: DISCONTINUED | OUTPATIENT
Start: 2024-06-19 | End: 2024-06-19 | Stop reason: HOSPADM

## 2024-06-19 RX ORDER — PROPOFOL 10 MG/ML
INJECTION, EMULSION INTRAVENOUS AS NEEDED
Status: DISCONTINUED | OUTPATIENT
Start: 2024-06-19 | End: 2024-06-19 | Stop reason: SURG

## 2024-06-19 RX ADMIN — SODIUM CHLORIDE, POTASSIUM CHLORIDE, SODIUM LACTATE AND CALCIUM CHLORIDE 50 ML/HR: 600; 310; 30; 20 INJECTION, SOLUTION INTRAVENOUS at 06:51

## 2024-06-19 RX ADMIN — FENTANYL CITRATE 50 MCG: 50 INJECTION, SOLUTION INTRAMUSCULAR; INTRAVENOUS at 07:43

## 2024-06-19 RX ADMIN — MIDAZOLAM HYDROCHLORIDE 2 MG: 1 INJECTION, SOLUTION INTRAMUSCULAR; INTRAVENOUS at 07:33

## 2024-06-19 RX ADMIN — PROPOFOL 100 MG: 10 INJECTION, EMULSION INTRAVENOUS at 07:36

## 2024-06-19 RX ADMIN — PROPOFOL 100 MCG/KG/MIN: 10 INJECTION, EMULSION INTRAVENOUS at 07:37

## 2024-06-19 RX ADMIN — Medication 50 MG: at 07:36

## 2024-06-19 NOTE — ANESTHESIA POSTPROCEDURE EVALUATION
Patient: Lauren Carrasco    Procedure Summary       Date: 06/19/24 Room / Location: [facility] ENDOSCOPY 1 / [facility] ENDOSCOPY    Anesthesia Start: 0726 Anesthesia Stop: 0800    Procedure: COLONOSCOPY WITH POLYPECTOMY Diagnosis:       Status post laparotomy      (Status post laparotomy [Z98.890])    Surgeons: Kelly Miranda DO Provider: Kareen Singh CRNA    Anesthesia Type: MAC ASA Status: 3 - Emergent            Anesthesia Type: MAC    Vitals  Vitals Value Taken Time   /66 06/19/24 0831   Temp 97 °F (36.1 °C) 06/19/24 0831   Pulse 69 06/19/24 0831   Resp 16 06/19/24 0831   SpO2 100 % 06/19/24 0831           Post Anesthesia Care and Evaluation    Patient location during evaluation: PHASE II  Patient participation: complete - patient participated  Level of consciousness: awake and alert  Pain score: 0  Pain management: satisfactory to patient    Airway patency: patent  Anesthetic complications: No anesthetic complications  PONV Status: none  Cardiovascular status: acceptable and stable  Respiratory status: acceptable  Hydration status: acceptable    Comments: Vitals signs as noted in nursing documentation as per protocol.

## 2024-06-19 NOTE — H&P
Reason for Consultation:  Post surgical colonoscopy    Chief complaint :  Post surgical colonoscopy    SUBJECTIVE:    History of present illness:  Patient is a 34 year old female with history of laparotomy, extensive lysis of adhesions, and ileocecectomy for concern for acute appendicitis status post remote open appendectomy. To ensure that there was no other pathologic process in her colon we are completing a colonoscopy today. Patient has no GI complaints at this time. Negative family history for colon cacner.     Review of Systems:    Review of Systems - General ROS: negative for - chills, fatigue, fever, hot flashes, malaise or night sweats  Psychological ROS: negative for - behavioral disorder, disorientation, hallucinations, hostility or mood swings  ENT ROS: negative for - nasal polyps, oral lesions, sinus pain, sneezing or sore throat  Breast ROS: negative for - galactorrhea or new or changing breast lumps  Respiratory ROS: negative for - hemoptysis, orthopnea, pleuritic pain, sputum changes or stridor  Cardiovascular ROS: negative for - dyspnea on exertion, edema, irregular heartbeat, murmur, orthopnea, palpitations or rapid heart rate  Gastrointestinal ROS: negative for - change in stools, gas/bloating, hematemesis, melena or stool incontinence.  Genito-Urinary ROS: negative for - dysuria, genital ulcers, nocturia or pelvic pain  Musculoskeletal ROS: negative for - gait disturbance or muscle pain  Neurological ROS: negative for - dizziness, gait disturbance, memory loss, numbness/tingling or seizures      Allergies:  No Known Allergies    Medications:    Current Facility-Administered Medications:     lactated ringers infusion, 50 mL/hr, Intravenous, Continuous, Ruth, Kelly B, DO, Last Rate: 50 mL/hr at 06/19/24 0651, 50 mL/hr at 06/19/24 0651    History:  Past Medical History:   Diagnosis Date    Abnormal Pap smear of cervix     Hypertension     PONV (postoperative nausea and vomiting)      Postpartum depression     Pregnancy 2023    Recurrent pregnancy loss, antepartum condition or complication        Past Surgical History:   Procedure Laterality Date    ADENOIDECTOMY      APPENDECTOMY       SECTION       SECTION N/A 2023    Procedure:  SECTION REPEAT;  Surgeon: Bri Jimenez MD;  Location: [facility];  Service: Obstetrics/Gynecology;  Laterality: N/A;    DIAGNOSTIC LAPAROSCOPY N/A 2023    Procedure: DIAGNOSTIC LAPAROSCOPY with lysis of adhesions, exploratory laparatomy with ileocecectomy;  Surgeon: Kelly Miranda DO;  Location: [facility];  Service: General;  Laterality: N/A;    WISDOM TOOTH EXTRACTION         Family History   Problem Relation Age of Onset    Hypertension Father     Stroke Father     Hyperlipidemia Mother     Stroke Mother         Blood clots       Social History     Tobacco Use    Smoking status: Former     Current packs/day: 0.00     Types: Cigarettes     Quit date: 2021     Years since quitting: 3.0    Smokeless tobacco: Never   Vaping Use    Vaping status: Never Used   Substance Use Topics    Alcohol use: No    Drug use: Defer          OBJECTIVE:    Vital Signs   Temp:  [96.8 °F (36 °C)] 96.8 °F (36 °C)  Heart Rate:  [81] 81  Resp:  [16] 16  BP: (123)/(83) 123/83    Physical Exam:     General Appearance:    Alert, cooperative, in no acute distress   Head:    Normocephalic, without obvious abnormality, atraumatic   Eyes:            Lids and lashes normal, conjunctivae and sclerae normal, no   icterus, no pallor, corneas clear, PERRLA   Throat:   No oral lesions, no thrush, oral mucosa moist   Neck:   No adenopathy, supple, trachea midline, no thyromegaly, no   carotid bruit, no JVD   Lungs:     Clear to auscultation,respirations regular, even and                  unlabored    Heart:    Regular rhythm and normal rate, normal S1 and S2, no            murmur, no gallop, no rub, no click   Chest Wall:    No  abnormalities observed   Abdomen:     Obese, normal bowel sounds, no masses, no organomegaly, soft        non-tender, non-distended, no guarding, there is no evidence of tenderness   Extremities:   Moves all extremities well, no edema, no cyanosis, no             redness   Skin:   No bleeding, bruising or rash   Neurologic:   Cranial nerves 2 - 12 grossly intact, sensation intact, DTR       present and equal bilaterally           ASSESSMENT/PLAN:    Post surgical colonoscopy    I did have a detailed and extensive discussion with the patient in the office and hospital today.  The full risks and benefits of operative versus nonoperative intervention were discussed with the paient, they understand, agree, and wish to proceed with the surgical treatment plan of colonoscopy.      Kelly Miranda, DO

## 2024-06-20 LAB — REF LAB TEST METHOD: NORMAL

## (undated) DEVICE — SOL IRR NACL 0.9PCT BT 1000ML

## (undated) DEVICE — GLV SURG SENSICARE W/ALOE PF LF 6.5 STRL

## (undated) DEVICE — LUBE JELLY PK/2.75GM STRL BX/144

## (undated) DEVICE — GLV SURG BIOGEL M LTX PF 6 1/2

## (undated) DEVICE — SUT GUT CHRM 1 CTX 36IN 905H

## (undated) DEVICE — THE ECHELON FLEX POWERED PLUS ARTICULATING ENDOSCOPIC LINEAR CUTTERS ARE STERILE, SINGLE PATIENT USE INSTRUMENTS THAT SIMULTANEOUSLYCUT AND STAPLE TISSUE. THERE ARE SIX STAGGERED ROWS OF STAPLES, THREE ON EITHER SIDE OF THE CUT LINE. THE ECHELON FLEX 45 POWERED PLUSINSTRUMENTS HAVE A STAPLE LINE THAT IS APPROXIMATELY 45 MM LONG AND A CUT LINE THAT IS APPROXIMATELY 42 MM LONG. THE SHAFT CAN ROTATE FREELYIN BOTH DIRECTIONS AND AN ARTICULATION MECHANISM ENABLES THE DISTAL PORTION OF THE SHAFT TO PIVOT TO FACILITATE LATERAL ACCESS TO THE OPERATIVESITE.THE INSTRUMENTS ARE PACKAGED WITH A PRIMARY LITHIUM BATTERY PACK THAT MUST BE INSTALLED PRIOR TO USE. THERE ARE SPECIFIC REQUIREMENTS FORDISPOSING OF THE BATTERY PACK. REFER TO THE BATTERY PACK DISPOSAL SECTION.THE INSTRUMENTS ARE PACKAGED WITHOUT A RELOAD AND MUST BE LOADED PRIOR TO USE. A STAPLE RETAINING CAP ON THE RELOAD PROTECTS THE STAPLE LEGPOINTS DURING SHIPPING AND TRANSPORTATION. THE INSTRUMENTS’ LOCK-OUT FEATURE IS DESIGNED TO PREVENT A USED OR IMPROPERLY INSTALLED RELOADFROM BEING REFIRED OR AN INSTRUMENT FROM BEING FIRED WITHOUT A RELOAD.: Brand: ECHELON FLEX

## (undated) DEVICE — ADHS SKIN PREMIERPRO EXOFIN TOPICAL HI/VISC .5ML

## (undated) DEVICE — FRCP BX RADJAW4 NDL 2.8 240 STD OG

## (undated) DEVICE — SUT SILK 3/0 SH CR8 18IN C013D

## (undated) DEVICE — TOTAL TRAY, 16FR 10ML SIL FOLEY, URN: Brand: MEDLINE

## (undated) DEVICE — PREVENA PEEL & PLACE SYSTEM KIT- 13 CM: Brand: PREVENA™ PEEL & PLACE™

## (undated) DEVICE — SUT GUT CHRM 2/0 SH 27IN G123H

## (undated) DEVICE — COUNT NDL FOAM STRIP W/MAG 60CT

## (undated) DEVICE — SLV SCD CALF HEMOFORCE DVT THERP REPROC MD

## (undated) DEVICE — 5 MM BABCOCKS WITH RATCHET HANDLES: Brand: ENDOPATH

## (undated) DEVICE — PROXIMATE SKIN STAPLERS (35 WIDE) CONTAINS 35 STAINLESS STEEL STAPLES (FIXED HEAD): Brand: PROXIMATE

## (undated) DEVICE — GLV SURG SENSICARE POLYISPRN W/ALOE PF LF 6.5 GRN STRL

## (undated) DEVICE — SUT PDS 0 CT 36IN VIO PDP358T

## (undated) DEVICE — SUT PDS 0 CT 36IN DYED Z358T

## (undated) DEVICE — TUBING, SUCTION, 1/4" X 12', STRAIGHT: Brand: MEDLINE

## (undated) DEVICE — SUTURE GUT CHROMIC 3/0 912H

## (undated) DEVICE — ENDOPATH XCEL BLADELESS TROCARS WITH STABILITY SLEEVES: Brand: ENDOPATH XCEL

## (undated) DEVICE — SUT PROLN 0/0 CTX 30IN 8454H

## (undated) DEVICE — Device

## (undated) DEVICE — ENDOPATH XCEL UNIVERSAL TROCAR STABLILITY SLEEVES: Brand: ENDOPATH XCEL

## (undated) DEVICE — HARMONIC 1100 SHEARS, 36CM SHAFT LENGTH: Brand: HARMONIC

## (undated) DEVICE — SPNG LAP 18X18IN LF STRL PK/5

## (undated) DEVICE — CUP EXTRCT VAC

## (undated) DEVICE — RICH GENERAL LAPAROSCOPY: Brand: MEDLINE INDUSTRIES, INC.

## (undated) DEVICE — VLV SXN AIR/H2O ORCAPOD3 1P/U STRL

## (undated) DEVICE — DRSNG SURG AQUACEL AG/ADVNTGE 9X25CM 3.5X10IN

## (undated) DEVICE — MEDI-VAC NON-CONDUCTIVE SUCTION TUBING: Brand: CARDINAL HEALTH

## (undated) DEVICE — SUT SILK 0 TIES 30IN A306H

## (undated) DEVICE — TBG PENCL TELESCP MEGADYNE SMOKE EVAC 10FT

## (undated) DEVICE — SUT VIC 4/0 KS 27IN VCP662H

## (undated) DEVICE — ANTIBACTERIAL UNDYED BRAIDED (POLYGLACTIN 910), SYNTHETIC ABSORBABLE SUTURE: Brand: COATED VICRYL

## (undated) DEVICE — RICH C-SECTION: Brand: MEDLINE INDUSTRIES, INC.

## (undated) DEVICE — SUT VIC 0/0 UR6 27IN DYED J603H

## (undated) DEVICE — CVR HNDL LIGHT RIGID

## (undated) DEVICE — TP SXN YANKR BULB STRL

## (undated) DEVICE — APPL CHLORAPREP W/TINT 26ML ORNG

## (undated) DEVICE — LARGE, DISPOSABLE ALEXIS O C-SECTION PROTECTOR - RETRACTOR: Brand: ALEXIS ® O C-SECTION PROTECTOR - RETRACTOR

## (undated) DEVICE — 2, DISPOSABLE SUCTION/IRRIGATOR WITHOUT DISPOSABLE TIP: Brand: STRYKEFLOW

## (undated) DEVICE — BLD CLIP UNIV SURG GRY